# Patient Record
Sex: MALE | Race: ASIAN | NOT HISPANIC OR LATINO | URBAN - METROPOLITAN AREA
[De-identification: names, ages, dates, MRNs, and addresses within clinical notes are randomized per-mention and may not be internally consistent; named-entity substitution may affect disease eponyms.]

---

## 2024-01-01 ENCOUNTER — INPATIENT (INPATIENT)
Facility: HOSPITAL | Age: 74
LOS: 19 days | DRG: 641 | End: 2024-09-05
Attending: INTERNAL MEDICINE | Admitting: HOSPITALIST
Payer: MEDICARE

## 2024-01-01 VITALS
DIASTOLIC BLOOD PRESSURE: 84 MMHG | OXYGEN SATURATION: 97 % | SYSTOLIC BLOOD PRESSURE: 145 MMHG | HEART RATE: 80 BPM | TEMPERATURE: 98 F | HEIGHT: 65 IN | WEIGHT: 149.91 LBS | RESPIRATION RATE: 16 BRPM

## 2024-01-01 VITALS
TEMPERATURE: 98 F | SYSTOLIC BLOOD PRESSURE: 82 MMHG | DIASTOLIC BLOOD PRESSURE: 66 MMHG | OXYGEN SATURATION: 100 % | RESPIRATION RATE: 23 BRPM | HEART RATE: 98 BPM

## 2024-01-01 DIAGNOSIS — E87.1 HYPO-OSMOLALITY AND HYPONATREMIA: ICD-10-CM

## 2024-01-01 DIAGNOSIS — I25.10 ATHEROSCLEROTIC HEART DISEASE OF NATIVE CORONARY ARTERY WITHOUT ANGINA PECTORIS: ICD-10-CM

## 2024-01-01 DIAGNOSIS — R79.89 OTHER SPECIFIED ABNORMAL FINDINGS OF BLOOD CHEMISTRY: ICD-10-CM

## 2024-01-01 DIAGNOSIS — Z29.9 ENCOUNTER FOR PROPHYLACTIC MEASURES, UNSPECIFIED: ICD-10-CM

## 2024-01-01 DIAGNOSIS — Z51.5 ENCOUNTER FOR PALLIATIVE CARE: ICD-10-CM

## 2024-01-01 DIAGNOSIS — R23.4 CHANGES IN SKIN TEXTURE: ICD-10-CM

## 2024-01-01 DIAGNOSIS — C16.9 MALIGNANT NEOPLASM OF STOMACH, UNSPECIFIED: ICD-10-CM

## 2024-01-01 DIAGNOSIS — R74.8 ABNORMAL LEVELS OF OTHER SERUM ENZYMES: ICD-10-CM

## 2024-01-01 DIAGNOSIS — I10 ESSENTIAL (PRIMARY) HYPERTENSION: ICD-10-CM

## 2024-01-01 DIAGNOSIS — Z71.89 OTHER SPECIFIED COUNSELING: ICD-10-CM

## 2024-01-01 DIAGNOSIS — R53.81 OTHER MALAISE: ICD-10-CM

## 2024-01-01 DIAGNOSIS — R06.02 SHORTNESS OF BREATH: ICD-10-CM

## 2024-01-01 DIAGNOSIS — N40.0 BENIGN PROSTATIC HYPERPLASIA WITHOUT LOWER URINARY TRACT SYMPTOMS: ICD-10-CM

## 2024-01-01 DIAGNOSIS — E78.5 HYPERLIPIDEMIA, UNSPECIFIED: ICD-10-CM

## 2024-01-01 LAB
ADD ON TEST-SPECIMEN IN LAB: SIGNIFICANT CHANGE UP
ADD ON TEST-SPECIMEN IN LAB: SIGNIFICANT CHANGE UP
ALBUMIN SERPL ELPH-MCNC: 2 G/DL — LOW (ref 3.3–5)
ALBUMIN SERPL ELPH-MCNC: 2.2 G/DL — LOW (ref 3.3–5)
ALBUMIN SERPL ELPH-MCNC: 2.5 G/DL — LOW (ref 3.3–5)
ALBUMIN SERPL ELPH-MCNC: 2.6 G/DL — LOW (ref 3.3–5)
ALBUMIN SERPL ELPH-MCNC: 2.7 G/DL — LOW (ref 3.3–5)
ALBUMIN SERPL ELPH-MCNC: 2.8 G/DL — LOW (ref 3.3–5)
ALBUMIN SERPL ELPH-MCNC: 2.9 G/DL — LOW (ref 3.3–5)
ALBUMIN SERPL ELPH-MCNC: 3 G/DL — LOW (ref 3.3–5)
ALBUMIN SERPL ELPH-MCNC: 3 G/DL — LOW (ref 3.3–5)
ALBUMIN SERPL ELPH-MCNC: 3.1 G/DL — LOW (ref 3.3–5)
ALBUMIN SERPL ELPH-MCNC: 3.1 G/DL — LOW (ref 3.3–5)
ALBUMIN SERPL ELPH-MCNC: 3.2 G/DL — LOW (ref 3.3–5)
ALBUMIN SERPL ELPH-MCNC: 3.4 G/DL — SIGNIFICANT CHANGE UP (ref 3.3–5)
ALP SERPL-CCNC: 569 U/L — HIGH (ref 40–120)
ALP SERPL-CCNC: 614 U/L — HIGH (ref 40–120)
ALP SERPL-CCNC: 785 U/L — HIGH (ref 40–120)
ALP SERPL-CCNC: 785 U/L — HIGH (ref 40–120)
ALP SERPL-CCNC: 789 U/L — HIGH (ref 40–120)
ALP SERPL-CCNC: 797 U/L — HIGH (ref 40–120)
ALP SERPL-CCNC: 797 U/L — HIGH (ref 40–120)
ALP SERPL-CCNC: 806 U/L — HIGH (ref 40–120)
ALP SERPL-CCNC: 810 U/L — HIGH (ref 40–120)
ALP SERPL-CCNC: 828 U/L — HIGH (ref 40–120)
ALP SERPL-CCNC: 849 U/L — HIGH (ref 40–120)
ALP SERPL-CCNC: 853 U/L — HIGH (ref 40–120)
ALP SERPL-CCNC: 880 U/L — HIGH (ref 40–120)
ALP SERPL-CCNC: 881 U/L — HIGH (ref 40–120)
ALP SERPL-CCNC: 887 U/L — HIGH (ref 40–120)
ALP SERPL-CCNC: 909 U/L — HIGH (ref 40–120)
ALP SERPL-CCNC: 958 U/L — HIGH (ref 40–120)
ALP SERPL-CCNC: 978 U/L — HIGH (ref 40–120)
ALT FLD-CCNC: 103 U/L — HIGH (ref 10–45)
ALT FLD-CCNC: 107 U/L — HIGH (ref 10–45)
ALT FLD-CCNC: 108 U/L — HIGH (ref 10–45)
ALT FLD-CCNC: 114 U/L — HIGH (ref 10–45)
ALT FLD-CCNC: 114 U/L — HIGH (ref 10–45)
ALT FLD-CCNC: 121 U/L — HIGH (ref 10–45)
ALT FLD-CCNC: 125 U/L — HIGH (ref 10–45)
ALT FLD-CCNC: 128 U/L — HIGH (ref 10–45)
ALT FLD-CCNC: 141 U/L — HIGH (ref 10–45)
ALT FLD-CCNC: 172 U/L — HIGH (ref 10–45)
ALT FLD-CCNC: 215 U/L — HIGH (ref 10–45)
ALT FLD-CCNC: 217 U/L — HIGH (ref 10–45)
ALT FLD-CCNC: 292 U/L — HIGH (ref 10–45)
ALT FLD-CCNC: 310 U/L — HIGH (ref 10–45)
ALT FLD-CCNC: 71 U/L — HIGH (ref 10–45)
ALT FLD-CCNC: 72 U/L — HIGH (ref 10–45)
ALT FLD-CCNC: 73 U/L — HIGH (ref 10–45)
ALT FLD-CCNC: 91 U/L — HIGH (ref 10–45)
ANION GAP SERPL CALC-SCNC: 12 MMOL/L — SIGNIFICANT CHANGE UP (ref 5–17)
ANION GAP SERPL CALC-SCNC: 14 MMOL/L — SIGNIFICANT CHANGE UP (ref 5–17)
ANION GAP SERPL CALC-SCNC: 15 MMOL/L — SIGNIFICANT CHANGE UP (ref 5–17)
ANION GAP SERPL CALC-SCNC: 16 MMOL/L — SIGNIFICANT CHANGE UP (ref 5–17)
ANION GAP SERPL CALC-SCNC: 17 MMOL/L — SIGNIFICANT CHANGE UP (ref 5–17)
ANION GAP SERPL CALC-SCNC: 18 MMOL/L — HIGH (ref 5–17)
ANION GAP SERPL CALC-SCNC: 19 MMOL/L — HIGH (ref 5–17)
ANION GAP SERPL CALC-SCNC: 20 MMOL/L — HIGH (ref 5–17)
ANION GAP SERPL CALC-SCNC: 23 MMOL/L — HIGH (ref 5–17)
ANION GAP SERPL CALC-SCNC: 25 MMOL/L — HIGH (ref 5–17)
ANION GAP SERPL CALC-SCNC: 28 MMOL/L — HIGH (ref 5–17)
ANION GAP SERPL CALC-SCNC: 40 MMOL/L — HIGH (ref 5–17)
ANISOCYTOSIS BLD QL: SIGNIFICANT CHANGE UP
ANISOCYTOSIS BLD QL: SIGNIFICANT CHANGE UP
APPEARANCE UR: CLEAR — SIGNIFICANT CHANGE UP
APTT BLD: 25.2 SEC — SIGNIFICANT CHANGE UP (ref 24.5–35.6)
APTT BLD: 26.1 SEC — SIGNIFICANT CHANGE UP (ref 24.5–35.6)
APTT BLD: 26.9 SEC — SIGNIFICANT CHANGE UP (ref 24.5–35.6)
APTT BLD: 36.3 SEC — HIGH (ref 24.5–35.6)
AST SERPL-CCNC: 132 U/L — HIGH (ref 10–40)
AST SERPL-CCNC: 136 U/L — HIGH (ref 10–40)
AST SERPL-CCNC: 158 U/L — HIGH (ref 10–40)
AST SERPL-CCNC: 161 U/L — HIGH (ref 10–40)
AST SERPL-CCNC: 180 U/L — HIGH (ref 10–40)
AST SERPL-CCNC: 194 U/L — HIGH (ref 10–40)
AST SERPL-CCNC: 204 U/L — HIGH (ref 10–40)
AST SERPL-CCNC: 213 U/L — HIGH (ref 10–40)
AST SERPL-CCNC: 214 U/L — HIGH (ref 10–40)
AST SERPL-CCNC: 239 U/L — HIGH (ref 10–40)
AST SERPL-CCNC: 248 U/L — HIGH (ref 10–40)
AST SERPL-CCNC: 267 U/L — HIGH (ref 10–40)
AST SERPL-CCNC: 304 U/L — HIGH (ref 10–40)
AST SERPL-CCNC: 402 U/L — HIGH (ref 10–40)
AST SERPL-CCNC: 417 U/L — HIGH (ref 10–40)
AST SERPL-CCNC: 511 U/L — HIGH (ref 10–40)
AST SERPL-CCNC: 661 U/L — HIGH (ref 10–40)
AST SERPL-CCNC: 893 U/L — HIGH (ref 10–40)
BASOPHILS # BLD AUTO: 0 K/UL — SIGNIFICANT CHANGE UP (ref 0–0.2)
BASOPHILS # BLD AUTO: 0 K/UL — SIGNIFICANT CHANGE UP (ref 0–0.2)
BASOPHILS # BLD AUTO: 0.03 K/UL — SIGNIFICANT CHANGE UP (ref 0–0.2)
BASOPHILS # BLD AUTO: 0.06 K/UL — SIGNIFICANT CHANGE UP (ref 0–0.2)
BASOPHILS # BLD AUTO: 0.07 K/UL — SIGNIFICANT CHANGE UP (ref 0–0.2)
BASOPHILS NFR BLD AUTO: 0 % — SIGNIFICANT CHANGE UP (ref 0–2)
BASOPHILS NFR BLD AUTO: 0 % — SIGNIFICANT CHANGE UP (ref 0–2)
BASOPHILS NFR BLD AUTO: 0.2 % — SIGNIFICANT CHANGE UP (ref 0–2)
BASOPHILS NFR BLD AUTO: 0.7 % — SIGNIFICANT CHANGE UP (ref 0–2)
BASOPHILS NFR BLD AUTO: 1 % — SIGNIFICANT CHANGE UP (ref 0–2)
BILIRUB DIRECT SERPL-MCNC: 2.1 MG/DL — HIGH (ref 0–0.3)
BILIRUB DIRECT SERPL-MCNC: 7.1 MG/DL — HIGH (ref 0–0.3)
BILIRUB INDIRECT FLD-MCNC: 0.9 MG/DL — SIGNIFICANT CHANGE UP (ref 0.2–1)
BILIRUB SERPL-MCNC: 1.5 MG/DL — HIGH (ref 0.2–1.2)
BILIRUB SERPL-MCNC: 10.2 MG/DL — HIGH (ref 0.2–1.2)
BILIRUB SERPL-MCNC: 10.5 MG/DL — HIGH (ref 0.2–1.2)
BILIRUB SERPL-MCNC: 10.6 MG/DL — HIGH (ref 0.2–1.2)
BILIRUB SERPL-MCNC: 10.8 MG/DL — HIGH (ref 0.2–1.2)
BILIRUB SERPL-MCNC: 3 MG/DL — HIGH (ref 0.2–1.2)
BILIRUB SERPL-MCNC: 3 MG/DL — HIGH (ref 0.2–1.2)
BILIRUB SERPL-MCNC: 3.3 MG/DL — HIGH (ref 0.2–1.2)
BILIRUB SERPL-MCNC: 4.2 MG/DL — HIGH (ref 0.2–1.2)
BILIRUB SERPL-MCNC: 4.2 MG/DL — HIGH (ref 0.2–1.2)
BILIRUB SERPL-MCNC: 5 MG/DL — HIGH (ref 0.2–1.2)
BILIRUB SERPL-MCNC: 5.9 MG/DL — HIGH (ref 0.2–1.2)
BILIRUB SERPL-MCNC: 7 MG/DL — HIGH (ref 0.2–1.2)
BILIRUB SERPL-MCNC: 7.2 MG/DL — HIGH (ref 0.2–1.2)
BILIRUB SERPL-MCNC: 8.1 MG/DL — HIGH (ref 0.2–1.2)
BILIRUB SERPL-MCNC: 9 MG/DL — HIGH (ref 0.2–1.2)
BILIRUB SERPL-MCNC: 9 MG/DL — HIGH (ref 0.2–1.2)
BILIRUB SERPL-MCNC: 9.5 MG/DL — HIGH (ref 0.2–1.2)
BILIRUB SERPL-MCNC: 9.8 MG/DL — HIGH (ref 0.2–1.2)
BILIRUB UR-MCNC: NEGATIVE — SIGNIFICANT CHANGE UP
BLD GP AB SCN SERPL QL: NEGATIVE — SIGNIFICANT CHANGE UP
BUN SERPL-MCNC: 13 MG/DL — SIGNIFICANT CHANGE UP (ref 7–23)
BUN SERPL-MCNC: 16 MG/DL — SIGNIFICANT CHANGE UP (ref 7–23)
BUN SERPL-MCNC: 16 MG/DL — SIGNIFICANT CHANGE UP (ref 7–23)
BUN SERPL-MCNC: 17 MG/DL — SIGNIFICANT CHANGE UP (ref 7–23)
BUN SERPL-MCNC: 17 MG/DL — SIGNIFICANT CHANGE UP (ref 7–23)
BUN SERPL-MCNC: 18 MG/DL — SIGNIFICANT CHANGE UP (ref 7–23)
BUN SERPL-MCNC: 18 MG/DL — SIGNIFICANT CHANGE UP (ref 7–23)
BUN SERPL-MCNC: 19 MG/DL — SIGNIFICANT CHANGE UP (ref 7–23)
BUN SERPL-MCNC: 20 MG/DL — SIGNIFICANT CHANGE UP (ref 7–23)
BUN SERPL-MCNC: 21 MG/DL — SIGNIFICANT CHANGE UP (ref 7–23)
BUN SERPL-MCNC: 21 MG/DL — SIGNIFICANT CHANGE UP (ref 7–23)
BUN SERPL-MCNC: 22 MG/DL — SIGNIFICANT CHANGE UP (ref 7–23)
BUN SERPL-MCNC: 22 MG/DL — SIGNIFICANT CHANGE UP (ref 7–23)
BUN SERPL-MCNC: 23 MG/DL — SIGNIFICANT CHANGE UP (ref 7–23)
BUN SERPL-MCNC: 25 MG/DL — HIGH (ref 7–23)
BUN SERPL-MCNC: 27 MG/DL — HIGH (ref 7–23)
BUN SERPL-MCNC: 28 MG/DL — HIGH (ref 7–23)
BUN SERPL-MCNC: 38 MG/DL — HIGH (ref 7–23)
BUN SERPL-MCNC: 59 MG/DL — HIGH (ref 7–23)
BUN SERPL-MCNC: 62 MG/DL — HIGH (ref 7–23)
BUN SERPL-MCNC: 70 MG/DL — HIGH (ref 7–23)
BUN SERPL-MCNC: 73 MG/DL — HIGH (ref 7–23)
BUN SERPL-MCNC: 73 MG/DL — HIGH (ref 7–23)
BUN SERPL-MCNC: 80 MG/DL — HIGH (ref 7–23)
BUN SERPL-MCNC: 86 MG/DL — HIGH (ref 7–23)
BUN SERPL-MCNC: 89 MG/DL — HIGH (ref 7–23)
BUN SERPL-MCNC: 98 MG/DL — HIGH (ref 7–23)
BURR CELLS BLD QL SMEAR: PRESENT — SIGNIFICANT CHANGE UP
CALCIUM SERPL-MCNC: 10 MG/DL — SIGNIFICANT CHANGE UP (ref 8.4–10.5)
CALCIUM SERPL-MCNC: 10.1 MG/DL — SIGNIFICANT CHANGE UP (ref 8.4–10.5)
CALCIUM SERPL-MCNC: 10.1 MG/DL — SIGNIFICANT CHANGE UP (ref 8.4–10.5)
CALCIUM SERPL-MCNC: 10.3 MG/DL — SIGNIFICANT CHANGE UP (ref 8.4–10.5)
CALCIUM SERPL-MCNC: 10.3 MG/DL — SIGNIFICANT CHANGE UP (ref 8.4–10.5)
CALCIUM SERPL-MCNC: 10.7 MG/DL — HIGH (ref 8.4–10.5)
CALCIUM SERPL-MCNC: 8.9 MG/DL — SIGNIFICANT CHANGE UP (ref 8.4–10.5)
CALCIUM SERPL-MCNC: 9 MG/DL — SIGNIFICANT CHANGE UP (ref 8.4–10.5)
CALCIUM SERPL-MCNC: 9 MG/DL — SIGNIFICANT CHANGE UP (ref 8.4–10.5)
CALCIUM SERPL-MCNC: 9.1 MG/DL — SIGNIFICANT CHANGE UP (ref 8.4–10.5)
CALCIUM SERPL-MCNC: 9.2 MG/DL — SIGNIFICANT CHANGE UP (ref 8.4–10.5)
CALCIUM SERPL-MCNC: 9.3 MG/DL — SIGNIFICANT CHANGE UP (ref 8.4–10.5)
CALCIUM SERPL-MCNC: 9.4 MG/DL — SIGNIFICANT CHANGE UP (ref 8.4–10.5)
CALCIUM SERPL-MCNC: 9.5 MG/DL — SIGNIFICANT CHANGE UP (ref 8.4–10.5)
CALCIUM SERPL-MCNC: 9.6 MG/DL — SIGNIFICANT CHANGE UP (ref 8.4–10.5)
CALCIUM SERPL-MCNC: 9.6 MG/DL — SIGNIFICANT CHANGE UP (ref 8.4–10.5)
CALCIUM SERPL-MCNC: 9.7 MG/DL — SIGNIFICANT CHANGE UP (ref 8.4–10.5)
CALCIUM SERPL-MCNC: 9.7 MG/DL — SIGNIFICANT CHANGE UP (ref 8.4–10.5)
CALCIUM SERPL-MCNC: 9.8 MG/DL — SIGNIFICANT CHANGE UP (ref 8.4–10.5)
CALCIUM SERPL-MCNC: 9.8 MG/DL — SIGNIFICANT CHANGE UP (ref 8.4–10.5)
CHLORIDE SERPL-SCNC: 102 MMOL/L — SIGNIFICANT CHANGE UP (ref 96–108)
CHLORIDE SERPL-SCNC: 102 MMOL/L — SIGNIFICANT CHANGE UP (ref 96–108)
CHLORIDE SERPL-SCNC: 86 MMOL/L — LOW (ref 96–108)
CHLORIDE SERPL-SCNC: 87 MMOL/L — LOW (ref 96–108)
CHLORIDE SERPL-SCNC: 88 MMOL/L — LOW (ref 96–108)
CHLORIDE SERPL-SCNC: 90 MMOL/L — LOW (ref 96–108)
CHLORIDE SERPL-SCNC: 91 MMOL/L — LOW (ref 96–108)
CHLORIDE SERPL-SCNC: 92 MMOL/L — LOW (ref 96–108)
CHLORIDE SERPL-SCNC: 93 MMOL/L — LOW (ref 96–108)
CHLORIDE SERPL-SCNC: 95 MMOL/L — LOW (ref 96–108)
CHLORIDE SERPL-SCNC: 95 MMOL/L — LOW (ref 96–108)
CHLORIDE SERPL-SCNC: 96 MMOL/L — SIGNIFICANT CHANGE UP (ref 96–108)
CHLORIDE SERPL-SCNC: 96 MMOL/L — SIGNIFICANT CHANGE UP (ref 96–108)
CHLORIDE SERPL-SCNC: 99 MMOL/L — SIGNIFICANT CHANGE UP (ref 96–108)
CHLORIDE UR-SCNC: <20 MMOL/L — SIGNIFICANT CHANGE UP
CHLORIDE UR-SCNC: <20 MMOL/L — SIGNIFICANT CHANGE UP
CO2 SERPL-SCNC: 13 MMOL/L — LOW (ref 22–31)
CO2 SERPL-SCNC: 14 MMOL/L — LOW (ref 22–31)
CO2 SERPL-SCNC: 14 MMOL/L — LOW (ref 22–31)
CO2 SERPL-SCNC: 15 MMOL/L — LOW (ref 22–31)
CO2 SERPL-SCNC: 18 MMOL/L — LOW (ref 22–31)
CO2 SERPL-SCNC: 19 MMOL/L — LOW (ref 22–31)
CO2 SERPL-SCNC: 20 MMOL/L — LOW (ref 22–31)
CO2 SERPL-SCNC: 21 MMOL/L — LOW (ref 22–31)
CO2 SERPL-SCNC: 22 MMOL/L — SIGNIFICANT CHANGE UP (ref 22–31)
CO2 SERPL-SCNC: 24 MMOL/L — SIGNIFICANT CHANGE UP (ref 22–31)
CO2 SERPL-SCNC: <10 MMOL/L — CRITICAL LOW (ref 22–31)
COLOR SPEC: YELLOW — SIGNIFICANT CHANGE UP
CREAT ?TM UR-MCNC: 144 MG/DL — SIGNIFICANT CHANGE UP
CREAT ?TM UR-MCNC: 71 MG/DL — SIGNIFICANT CHANGE UP
CREAT ?TM UR-MCNC: 78 MG/DL — SIGNIFICANT CHANGE UP
CREAT SERPL-MCNC: 0.65 MG/DL — SIGNIFICANT CHANGE UP (ref 0.5–1.3)
CREAT SERPL-MCNC: 0.76 MG/DL — SIGNIFICANT CHANGE UP (ref 0.5–1.3)
CREAT SERPL-MCNC: 0.77 MG/DL — SIGNIFICANT CHANGE UP (ref 0.5–1.3)
CREAT SERPL-MCNC: 0.78 MG/DL — SIGNIFICANT CHANGE UP (ref 0.5–1.3)
CREAT SERPL-MCNC: 0.79 MG/DL — SIGNIFICANT CHANGE UP (ref 0.5–1.3)
CREAT SERPL-MCNC: 0.85 MG/DL — SIGNIFICANT CHANGE UP (ref 0.5–1.3)
CREAT SERPL-MCNC: 0.87 MG/DL — SIGNIFICANT CHANGE UP (ref 0.5–1.3)
CREAT SERPL-MCNC: 0.87 MG/DL — SIGNIFICANT CHANGE UP (ref 0.5–1.3)
CREAT SERPL-MCNC: 0.9 MG/DL — SIGNIFICANT CHANGE UP (ref 0.5–1.3)
CREAT SERPL-MCNC: 0.91 MG/DL — SIGNIFICANT CHANGE UP (ref 0.5–1.3)
CREAT SERPL-MCNC: 0.92 MG/DL — SIGNIFICANT CHANGE UP (ref 0.5–1.3)
CREAT SERPL-MCNC: 0.93 MG/DL — SIGNIFICANT CHANGE UP (ref 0.5–1.3)
CREAT SERPL-MCNC: 0.94 MG/DL — SIGNIFICANT CHANGE UP (ref 0.5–1.3)
CREAT SERPL-MCNC: 0.94 MG/DL — SIGNIFICANT CHANGE UP (ref 0.5–1.3)
CREAT SERPL-MCNC: 0.97 MG/DL — SIGNIFICANT CHANGE UP (ref 0.5–1.3)
CREAT SERPL-MCNC: 0.98 MG/DL — SIGNIFICANT CHANGE UP (ref 0.5–1.3)
CREAT SERPL-MCNC: 0.99 MG/DL — SIGNIFICANT CHANGE UP (ref 0.5–1.3)
CREAT SERPL-MCNC: 1.02 MG/DL — SIGNIFICANT CHANGE UP (ref 0.5–1.3)
CREAT SERPL-MCNC: 1.05 MG/DL — SIGNIFICANT CHANGE UP (ref 0.5–1.3)
CREAT SERPL-MCNC: 1.14 MG/DL — SIGNIFICANT CHANGE UP (ref 0.5–1.3)
CREAT SERPL-MCNC: 1.3 MG/DL — SIGNIFICANT CHANGE UP (ref 0.5–1.3)
CREAT SERPL-MCNC: 1.33 MG/DL — HIGH (ref 0.5–1.3)
CREAT SERPL-MCNC: 1.43 MG/DL — HIGH (ref 0.5–1.3)
CREAT SERPL-MCNC: 1.47 MG/DL — HIGH (ref 0.5–1.3)
CREAT SERPL-MCNC: 1.54 MG/DL — HIGH (ref 0.5–1.3)
CREAT SERPL-MCNC: 1.61 MG/DL — HIGH (ref 0.5–1.3)
CREAT SERPL-MCNC: 1.62 MG/DL — HIGH (ref 0.5–1.3)
CREAT SERPL-MCNC: 2.28 MG/DL — HIGH (ref 0.5–1.3)
DACRYOCYTES BLD QL SMEAR: SLIGHT — SIGNIFICANT CHANGE UP
DACRYOCYTES BLD QL SMEAR: SLIGHT — SIGNIFICANT CHANGE UP
DIFF PNL FLD: NEGATIVE — SIGNIFICANT CHANGE UP
EGFR: 29 ML/MIN/1.73M2 — LOW
EGFR: 44 ML/MIN/1.73M2 — LOW
EGFR: 44 ML/MIN/1.73M2 — LOW
EGFR: 47 ML/MIN/1.73M2 — LOW
EGFR: 49 ML/MIN/1.73M2 — LOW
EGFR: 51 ML/MIN/1.73M2 — LOW
EGFR: 56 ML/MIN/1.73M2 — LOW
EGFR: 57 ML/MIN/1.73M2 — LOW
EGFR: 67 ML/MIN/1.73M2 — SIGNIFICANT CHANGE UP
EGFR: 74 ML/MIN/1.73M2 — SIGNIFICANT CHANGE UP
EGFR: 77 ML/MIN/1.73M2 — SIGNIFICANT CHANGE UP
EGFR: 79 ML/MIN/1.73M2 — SIGNIFICANT CHANGE UP
EGFR: 80 ML/MIN/1.73M2 — SIGNIFICANT CHANGE UP
EGFR: 81 ML/MIN/1.73M2 — SIGNIFICANT CHANGE UP
EGFR: 85 ML/MIN/1.73M2 — SIGNIFICANT CHANGE UP
EGFR: 85 ML/MIN/1.73M2 — SIGNIFICANT CHANGE UP
EGFR: 86 ML/MIN/1.73M2 — SIGNIFICANT CHANGE UP
EGFR: 87 ML/MIN/1.73M2 — SIGNIFICANT CHANGE UP
EGFR: 88 ML/MIN/1.73M2 — SIGNIFICANT CHANGE UP
EGFR: 89 ML/MIN/1.73M2 — SIGNIFICANT CHANGE UP
EGFR: 90 ML/MIN/1.73M2 — SIGNIFICANT CHANGE UP
EGFR: 90 ML/MIN/1.73M2 — SIGNIFICANT CHANGE UP
EGFR: 91 ML/MIN/1.73M2 — SIGNIFICANT CHANGE UP
EGFR: 93 ML/MIN/1.73M2 — SIGNIFICANT CHANGE UP
EGFR: 94 ML/MIN/1.73M2 — SIGNIFICANT CHANGE UP
EGFR: 98 ML/MIN/1.73M2 — SIGNIFICANT CHANGE UP
ELLIPTOCYTES BLD QL SMEAR: SLIGHT — SIGNIFICANT CHANGE UP
EOSINOPHIL # BLD AUTO: 0 K/UL — SIGNIFICANT CHANGE UP (ref 0–0.5)
EOSINOPHIL # BLD AUTO: 0.07 K/UL — SIGNIFICANT CHANGE UP (ref 0–0.5)
EOSINOPHIL # BLD AUTO: 0.09 K/UL — SIGNIFICANT CHANGE UP (ref 0–0.5)
EOSINOPHIL NFR BLD AUTO: 0 % — SIGNIFICANT CHANGE UP (ref 0–6)
EOSINOPHIL NFR BLD AUTO: 0.8 % — SIGNIFICANT CHANGE UP (ref 0–6)
EOSINOPHIL NFR BLD AUTO: 1.2 % — SIGNIFICANT CHANGE UP (ref 0–6)
GAS PNL BLDA: SIGNIFICANT CHANGE UP
GAS PNL BLDV: SIGNIFICANT CHANGE UP
GLUCOSE BLDC GLUCOMTR-MCNC: 103 MG/DL — HIGH (ref 70–99)
GLUCOSE BLDC GLUCOMTR-MCNC: 107 MG/DL — HIGH (ref 70–99)
GLUCOSE BLDC GLUCOMTR-MCNC: 75 MG/DL — SIGNIFICANT CHANGE UP (ref 70–99)
GLUCOSE BLDC GLUCOMTR-MCNC: 93 MG/DL — SIGNIFICANT CHANGE UP (ref 70–99)
GLUCOSE BLDC GLUCOMTR-MCNC: 98 MG/DL — SIGNIFICANT CHANGE UP (ref 70–99)
GLUCOSE SERPL-MCNC: 101 MG/DL — HIGH (ref 70–99)
GLUCOSE SERPL-MCNC: 108 MG/DL — HIGH (ref 70–99)
GLUCOSE SERPL-MCNC: 110 MG/DL — HIGH (ref 70–99)
GLUCOSE SERPL-MCNC: 111 MG/DL — HIGH (ref 70–99)
GLUCOSE SERPL-MCNC: 116 MG/DL — HIGH (ref 70–99)
GLUCOSE SERPL-MCNC: 55 MG/DL — LOW (ref 70–99)
GLUCOSE SERPL-MCNC: 63 MG/DL — LOW (ref 70–99)
GLUCOSE SERPL-MCNC: 65 MG/DL — LOW (ref 70–99)
GLUCOSE SERPL-MCNC: 68 MG/DL — LOW (ref 70–99)
GLUCOSE SERPL-MCNC: 71 MG/DL — SIGNIFICANT CHANGE UP (ref 70–99)
GLUCOSE SERPL-MCNC: 72 MG/DL — SIGNIFICANT CHANGE UP (ref 70–99)
GLUCOSE SERPL-MCNC: 73 MG/DL — SIGNIFICANT CHANGE UP (ref 70–99)
GLUCOSE SERPL-MCNC: 73 MG/DL — SIGNIFICANT CHANGE UP (ref 70–99)
GLUCOSE SERPL-MCNC: 75 MG/DL — SIGNIFICANT CHANGE UP (ref 70–99)
GLUCOSE SERPL-MCNC: 78 MG/DL — SIGNIFICANT CHANGE UP (ref 70–99)
GLUCOSE SERPL-MCNC: 79 MG/DL — SIGNIFICANT CHANGE UP (ref 70–99)
GLUCOSE SERPL-MCNC: 79 MG/DL — SIGNIFICANT CHANGE UP (ref 70–99)
GLUCOSE SERPL-MCNC: 80 MG/DL — SIGNIFICANT CHANGE UP (ref 70–99)
GLUCOSE SERPL-MCNC: 81 MG/DL — SIGNIFICANT CHANGE UP (ref 70–99)
GLUCOSE SERPL-MCNC: 81 MG/DL — SIGNIFICANT CHANGE UP (ref 70–99)
GLUCOSE SERPL-MCNC: 83 MG/DL — SIGNIFICANT CHANGE UP (ref 70–99)
GLUCOSE SERPL-MCNC: 86 MG/DL — SIGNIFICANT CHANGE UP (ref 70–99)
GLUCOSE SERPL-MCNC: 88 MG/DL — SIGNIFICANT CHANGE UP (ref 70–99)
GLUCOSE SERPL-MCNC: 88 MG/DL — SIGNIFICANT CHANGE UP (ref 70–99)
GLUCOSE SERPL-MCNC: 90 MG/DL — SIGNIFICANT CHANGE UP (ref 70–99)
GLUCOSE SERPL-MCNC: 92 MG/DL — SIGNIFICANT CHANGE UP (ref 70–99)
GLUCOSE SERPL-MCNC: 92 MG/DL — SIGNIFICANT CHANGE UP (ref 70–99)
GLUCOSE SERPL-MCNC: 94 MG/DL — SIGNIFICANT CHANGE UP (ref 70–99)
GLUCOSE SERPL-MCNC: 95 MG/DL — SIGNIFICANT CHANGE UP (ref 70–99)
GLUCOSE SERPL-MCNC: 96 MG/DL — SIGNIFICANT CHANGE UP (ref 70–99)
GLUCOSE UR QL: NEGATIVE MG/DL — SIGNIFICANT CHANGE UP
HAV IGM SER-ACNC: SIGNIFICANT CHANGE UP
HBV CORE IGM SER-ACNC: SIGNIFICANT CHANGE UP
HBV SURFACE AB SER-ACNC: 131.6 MIU/ML — SIGNIFICANT CHANGE UP
HBV SURFACE AG SER-ACNC: SIGNIFICANT CHANGE UP
HCT VFR BLD CALC: 21.8 % — LOW (ref 39–50)
HCT VFR BLD CALC: 23.1 % — LOW (ref 39–50)
HCT VFR BLD CALC: 23.8 % — LOW (ref 39–50)
HCT VFR BLD CALC: 24.6 % — LOW (ref 39–50)
HCT VFR BLD CALC: 26.5 % — LOW (ref 39–50)
HCT VFR BLD CALC: 26.7 % — LOW (ref 39–50)
HCT VFR BLD CALC: 26.7 % — LOW (ref 39–50)
HCT VFR BLD CALC: 26.9 % — LOW (ref 39–50)
HCT VFR BLD CALC: 30 % — LOW (ref 39–50)
HCT VFR BLD CALC: 30.9 % — LOW (ref 39–50)
HCT VFR BLD CALC: 31.2 % — LOW (ref 39–50)
HCT VFR BLD CALC: 31.3 % — LOW (ref 39–50)
HCT VFR BLD CALC: 31.4 % — LOW (ref 39–50)
HCT VFR BLD CALC: 31.5 % — LOW (ref 39–50)
HCT VFR BLD CALC: 32.2 % — LOW (ref 39–50)
HCT VFR BLD CALC: 32.2 % — LOW (ref 39–50)
HCT VFR BLD CALC: 32.5 % — LOW (ref 39–50)
HCT VFR BLD CALC: 33.4 % — LOW (ref 39–50)
HCT VFR BLD CALC: 36.1 % — LOW (ref 39–50)
HCT VFR BLD CALC: 36.8 % — LOW (ref 39–50)
HCT VFR BLD CALC: 37.5 % — LOW (ref 39–50)
HCT VFR BLD CALC: 38 % — LOW (ref 39–50)
HCT VFR BLD CALC: 39.3 % — SIGNIFICANT CHANGE UP (ref 39–50)
HCV AB S/CO SERPL IA: 0.17 S/CO — SIGNIFICANT CHANGE UP (ref 0–0.99)
HCV AB SERPL-IMP: SIGNIFICANT CHANGE UP
HGB BLD-MCNC: 10.1 G/DL — LOW (ref 13–17)
HGB BLD-MCNC: 10.3 G/DL — LOW (ref 13–17)
HGB BLD-MCNC: 10.5 G/DL — LOW (ref 13–17)
HGB BLD-MCNC: 10.6 G/DL — LOW (ref 13–17)
HGB BLD-MCNC: 10.7 G/DL — LOW (ref 13–17)
HGB BLD-MCNC: 10.8 G/DL — LOW (ref 13–17)
HGB BLD-MCNC: 10.8 G/DL — LOW (ref 13–17)
HGB BLD-MCNC: 11 G/DL — LOW (ref 13–17)
HGB BLD-MCNC: 11.1 G/DL — LOW (ref 13–17)
HGB BLD-MCNC: 11.3 G/DL — LOW (ref 13–17)
HGB BLD-MCNC: 12 G/DL — LOW (ref 13–17)
HGB BLD-MCNC: 12.1 G/DL — LOW (ref 13–17)
HGB BLD-MCNC: 12.1 G/DL — LOW (ref 13–17)
HGB BLD-MCNC: 12.7 G/DL — LOW (ref 13–17)
HGB BLD-MCNC: 12.8 G/DL — LOW (ref 13–17)
HGB BLD-MCNC: 6.5 G/DL — CRITICAL LOW (ref 13–17)
HGB BLD-MCNC: 7.1 G/DL — LOW (ref 13–17)
HGB BLD-MCNC: 7.5 G/DL — LOW (ref 13–17)
HGB BLD-MCNC: 7.8 G/DL — LOW (ref 13–17)
HGB BLD-MCNC: 8.5 G/DL — LOW (ref 13–17)
HGB BLD-MCNC: 8.6 G/DL — LOW (ref 13–17)
HGB BLD-MCNC: 8.7 G/DL — LOW (ref 13–17)
HGB BLD-MCNC: 8.8 G/DL — LOW (ref 13–17)
HIV 1+2 AB+HIV1 P24 AG SERPL QL IA: SIGNIFICANT CHANGE UP
IMM GRANULOCYTES NFR BLD AUTO: 0.8 % — SIGNIFICANT CHANGE UP (ref 0–0.9)
IMM GRANULOCYTES NFR BLD AUTO: 0.9 % — SIGNIFICANT CHANGE UP (ref 0–0.9)
IMM GRANULOCYTES NFR BLD AUTO: 1 % — HIGH (ref 0–0.9)
INR BLD: 1.03 RATIO — SIGNIFICANT CHANGE UP (ref 0.85–1.18)
INR BLD: 1.1 RATIO — SIGNIFICANT CHANGE UP (ref 0.85–1.18)
INR BLD: 1.1 RATIO — SIGNIFICANT CHANGE UP (ref 0.85–1.18)
INR BLD: 1.7 RATIO — HIGH (ref 0.85–1.18)
KETONES UR-MCNC: NEGATIVE MG/DL — SIGNIFICANT CHANGE UP
LACTATE SERPL-SCNC: 3.8 MMOL/L — HIGH (ref 0.5–2)
LEUKOCYTE ESTERASE UR-ACNC: NEGATIVE — SIGNIFICANT CHANGE UP
LIDOCAIN IGE QN: 63 U/L — HIGH (ref 7–60)
LIDOCAIN IGE QN: 67 U/L — HIGH (ref 7–60)
LIDOCAIN IGE QN: 79 U/L — HIGH (ref 7–60)
LYMPHOCYTES # BLD AUTO: 0.6 K/UL — LOW (ref 1–3.3)
LYMPHOCYTES # BLD AUTO: 0.69 K/UL — LOW (ref 1–3.3)
LYMPHOCYTES # BLD AUTO: 0.82 K/UL — LOW (ref 1–3.3)
LYMPHOCYTES # BLD AUTO: 1.38 K/UL — SIGNIFICANT CHANGE UP (ref 1–3.3)
LYMPHOCYTES # BLD AUTO: 1.51 K/UL — SIGNIFICANT CHANGE UP (ref 1–3.3)
LYMPHOCYTES # BLD AUTO: 17.2 % — SIGNIFICANT CHANGE UP (ref 13–44)
LYMPHOCYTES # BLD AUTO: 18.8 % — SIGNIFICANT CHANGE UP (ref 13–44)
LYMPHOCYTES # BLD AUTO: 4.4 % — LOW (ref 13–44)
LYMPHOCYTES # BLD AUTO: 6.4 % — LOW (ref 13–44)
LYMPHOCYTES # BLD AUTO: 6.9 % — LOW (ref 13–44)
MACROCYTES BLD QL: SIGNIFICANT CHANGE UP
MACROCYTES BLD QL: SIGNIFICANT CHANGE UP
MAGNESIUM SERPL-MCNC: 2 MG/DL — SIGNIFICANT CHANGE UP (ref 1.6–2.6)
MAGNESIUM SERPL-MCNC: 2.1 MG/DL — SIGNIFICANT CHANGE UP (ref 1.6–2.6)
MAGNESIUM SERPL-MCNC: 2.2 MG/DL — SIGNIFICANT CHANGE UP (ref 1.6–2.6)
MAGNESIUM SERPL-MCNC: 2.2 MG/DL — SIGNIFICANT CHANGE UP (ref 1.6–2.6)
MAGNESIUM SERPL-MCNC: 2.3 MG/DL — SIGNIFICANT CHANGE UP (ref 1.6–2.6)
MAGNESIUM SERPL-MCNC: 2.3 MG/DL — SIGNIFICANT CHANGE UP (ref 1.6–2.6)
MAGNESIUM SERPL-MCNC: 2.4 MG/DL — SIGNIFICANT CHANGE UP (ref 1.6–2.6)
MAGNESIUM SERPL-MCNC: 2.5 MG/DL — SIGNIFICANT CHANGE UP (ref 1.6–2.6)
MAGNESIUM SERPL-MCNC: 2.5 MG/DL — SIGNIFICANT CHANGE UP (ref 1.6–2.6)
MAGNESIUM SERPL-MCNC: 2.6 MG/DL — SIGNIFICANT CHANGE UP (ref 1.6–2.6)
MAGNESIUM SERPL-MCNC: 2.7 MG/DL — HIGH (ref 1.6–2.6)
MAGNESIUM SERPL-MCNC: 3.1 MG/DL — HIGH (ref 1.6–2.6)
MANUAL SMEAR VERIFICATION: SIGNIFICANT CHANGE UP
MANUAL SMEAR VERIFICATION: SIGNIFICANT CHANGE UP
MCHC RBC-ENTMCNC: 29 PG — SIGNIFICANT CHANGE UP (ref 27–34)
MCHC RBC-ENTMCNC: 29.3 PG — SIGNIFICANT CHANGE UP (ref 27–34)
MCHC RBC-ENTMCNC: 29.4 PG — SIGNIFICANT CHANGE UP (ref 27–34)
MCHC RBC-ENTMCNC: 29.7 PG — SIGNIFICANT CHANGE UP (ref 27–34)
MCHC RBC-ENTMCNC: 29.8 GM/DL — LOW (ref 32–36)
MCHC RBC-ENTMCNC: 29.8 GM/DL — LOW (ref 32–36)
MCHC RBC-ENTMCNC: 29.8 PG — SIGNIFICANT CHANGE UP (ref 27–34)
MCHC RBC-ENTMCNC: 29.8 PG — SIGNIFICANT CHANGE UP (ref 27–34)
MCHC RBC-ENTMCNC: 29.9 PG — SIGNIFICANT CHANGE UP (ref 27–34)
MCHC RBC-ENTMCNC: 29.9 PG — SIGNIFICANT CHANGE UP (ref 27–34)
MCHC RBC-ENTMCNC: 30 PG — SIGNIFICANT CHANGE UP (ref 27–34)
MCHC RBC-ENTMCNC: 30.1 PG — SIGNIFICANT CHANGE UP (ref 27–34)
MCHC RBC-ENTMCNC: 30.1 PG — SIGNIFICANT CHANGE UP (ref 27–34)
MCHC RBC-ENTMCNC: 30.2 PG — SIGNIFICANT CHANGE UP (ref 27–34)
MCHC RBC-ENTMCNC: 30.3 PG — SIGNIFICANT CHANGE UP (ref 27–34)
MCHC RBC-ENTMCNC: 30.4 PG — SIGNIFICANT CHANGE UP (ref 27–34)
MCHC RBC-ENTMCNC: 30.5 PG — SIGNIFICANT CHANGE UP (ref 27–34)
MCHC RBC-ENTMCNC: 30.6 PG — SIGNIFICANT CHANGE UP (ref 27–34)
MCHC RBC-ENTMCNC: 30.8 PG — SIGNIFICANT CHANGE UP (ref 27–34)
MCHC RBC-ENTMCNC: 30.9 PG — SIGNIFICANT CHANGE UP (ref 27–34)
MCHC RBC-ENTMCNC: 31 PG — SIGNIFICANT CHANGE UP (ref 27–34)
MCHC RBC-ENTMCNC: 31.3 PG — SIGNIFICANT CHANGE UP (ref 27–34)
MCHC RBC-ENTMCNC: 31.6 PG — SIGNIFICANT CHANGE UP (ref 27–34)
MCHC RBC-ENTMCNC: 31.7 GM/DL — LOW (ref 32–36)
MCHC RBC-ENTMCNC: 32.1 GM/DL — SIGNIFICANT CHANGE UP (ref 32–36)
MCHC RBC-ENTMCNC: 32.2 GM/DL — SIGNIFICANT CHANGE UP (ref 32–36)
MCHC RBC-ENTMCNC: 32.3 GM/DL — SIGNIFICANT CHANGE UP (ref 32–36)
MCHC RBC-ENTMCNC: 32.3 GM/DL — SIGNIFICANT CHANGE UP (ref 32–36)
MCHC RBC-ENTMCNC: 32.5 GM/DL — SIGNIFICANT CHANGE UP (ref 32–36)
MCHC RBC-ENTMCNC: 32.6 GM/DL — SIGNIFICANT CHANGE UP (ref 32–36)
MCHC RBC-ENTMCNC: 32.7 GM/DL — SIGNIFICANT CHANGE UP (ref 32–36)
MCHC RBC-ENTMCNC: 32.8 GM/DL — SIGNIFICANT CHANGE UP (ref 32–36)
MCHC RBC-ENTMCNC: 32.9 GM/DL — SIGNIFICANT CHANGE UP (ref 32–36)
MCHC RBC-ENTMCNC: 33 GM/DL — SIGNIFICANT CHANGE UP (ref 32–36)
MCHC RBC-ENTMCNC: 33.2 GM/DL — SIGNIFICANT CHANGE UP (ref 32–36)
MCHC RBC-ENTMCNC: 33.2 GM/DL — SIGNIFICANT CHANGE UP (ref 32–36)
MCHC RBC-ENTMCNC: 33.4 GM/DL — SIGNIFICANT CHANGE UP (ref 32–36)
MCHC RBC-ENTMCNC: 33.5 GM/DL — SIGNIFICANT CHANGE UP (ref 32–36)
MCHC RBC-ENTMCNC: 33.5 GM/DL — SIGNIFICANT CHANGE UP (ref 32–36)
MCHC RBC-ENTMCNC: 34 GM/DL — SIGNIFICANT CHANGE UP (ref 32–36)
MCHC RBC-ENTMCNC: 34.2 GM/DL — SIGNIFICANT CHANGE UP (ref 32–36)
MCHC RBC-ENTMCNC: 34.6 GM/DL — SIGNIFICANT CHANGE UP (ref 32–36)
MCHC RBC-ENTMCNC: 34.8 GM/DL — SIGNIFICANT CHANGE UP (ref 32–36)
MCHC RBC-ENTMCNC: 35.3 GM/DL — SIGNIFICANT CHANGE UP (ref 32–36)
MCV RBC AUTO: 102.1 FL — HIGH (ref 80–100)
MCV RBC AUTO: 105.8 FL — HIGH (ref 80–100)
MCV RBC AUTO: 86 FL — SIGNIFICANT CHANGE UP (ref 80–100)
MCV RBC AUTO: 86.2 FL — SIGNIFICANT CHANGE UP (ref 80–100)
MCV RBC AUTO: 88 FL — SIGNIFICANT CHANGE UP (ref 80–100)
MCV RBC AUTO: 88.2 FL — SIGNIFICANT CHANGE UP (ref 80–100)
MCV RBC AUTO: 88.2 FL — SIGNIFICANT CHANGE UP (ref 80–100)
MCV RBC AUTO: 88.5 FL — SIGNIFICANT CHANGE UP (ref 80–100)
MCV RBC AUTO: 88.7 FL — SIGNIFICANT CHANGE UP (ref 80–100)
MCV RBC AUTO: 89 FL — SIGNIFICANT CHANGE UP (ref 80–100)
MCV RBC AUTO: 89.2 FL — SIGNIFICANT CHANGE UP (ref 80–100)
MCV RBC AUTO: 89.5 FL — SIGNIFICANT CHANGE UP (ref 80–100)
MCV RBC AUTO: 90 FL — SIGNIFICANT CHANGE UP (ref 80–100)
MCV RBC AUTO: 90.2 FL — SIGNIFICANT CHANGE UP (ref 80–100)
MCV RBC AUTO: 90.3 FL — SIGNIFICANT CHANGE UP (ref 80–100)
MCV RBC AUTO: 91.2 FL — SIGNIFICANT CHANGE UP (ref 80–100)
MCV RBC AUTO: 91.4 FL — SIGNIFICANT CHANGE UP (ref 80–100)
MCV RBC AUTO: 91.8 FL — SIGNIFICANT CHANGE UP (ref 80–100)
MCV RBC AUTO: 92.8 FL — SIGNIFICANT CHANGE UP (ref 80–100)
MCV RBC AUTO: 95 FL — SIGNIFICANT CHANGE UP (ref 80–100)
MCV RBC AUTO: 95.1 FL — SIGNIFICANT CHANGE UP (ref 80–100)
MCV RBC AUTO: 96 FL — SIGNIFICANT CHANGE UP (ref 80–100)
MCV RBC AUTO: 98.8 FL — SIGNIFICANT CHANGE UP (ref 80–100)
METAMYELOCYTES # FLD: 0.9 % — HIGH (ref 0–0)
MONOCYTES # BLD AUTO: 0.48 K/UL — SIGNIFICANT CHANGE UP (ref 0–0.9)
MONOCYTES # BLD AUTO: 0.59 K/UL — SIGNIFICANT CHANGE UP (ref 0–0.9)
MONOCYTES # BLD AUTO: 0.68 K/UL — SIGNIFICANT CHANGE UP (ref 0–0.9)
MONOCYTES # BLD AUTO: 0.75 K/UL — SIGNIFICANT CHANGE UP (ref 0–0.9)
MONOCYTES # BLD AUTO: 0.82 K/UL — SIGNIFICANT CHANGE UP (ref 0–0.9)
MONOCYTES NFR BLD AUTO: 3.8 % — SIGNIFICANT CHANGE UP (ref 2–14)
MONOCYTES NFR BLD AUTO: 5.2 % — SIGNIFICANT CHANGE UP (ref 2–14)
MONOCYTES NFR BLD AUTO: 7.8 % — SIGNIFICANT CHANGE UP (ref 2–14)
MONOCYTES NFR BLD AUTO: 8 % — SIGNIFICANT CHANGE UP (ref 2–14)
MONOCYTES NFR BLD AUTO: 8.6 % — SIGNIFICANT CHANGE UP (ref 2–14)
MYELOCYTES NFR BLD: 1.7 % — HIGH (ref 0–0)
NEUTROPHILS # BLD AUTO: 11.34 K/UL — HIGH (ref 1.8–7.4)
NEUTROPHILS # BLD AUTO: 14 K/UL — HIGH (ref 1.8–7.4)
NEUTROPHILS # BLD AUTO: 5.14 K/UL — SIGNIFICANT CHANGE UP (ref 1.8–7.4)
NEUTROPHILS # BLD AUTO: 6.28 K/UL — SIGNIFICANT CHANGE UP (ref 1.8–7.4)
NEUTROPHILS # BLD AUTO: 7.33 K/UL — SIGNIFICANT CHANGE UP (ref 1.8–7.4)
NEUTROPHILS NFR BLD AUTO: 70.2 % — SIGNIFICANT CHANGE UP (ref 43–77)
NEUTROPHILS NFR BLD AUTO: 71.7 % — SIGNIFICANT CHANGE UP (ref 43–77)
NEUTROPHILS NFR BLD AUTO: 78.3 % — HIGH (ref 43–77)
NEUTROPHILS NFR BLD AUTO: 88.7 % — HIGH (ref 43–77)
NEUTROPHILS NFR BLD AUTO: 88.7 % — HIGH (ref 43–77)
NEUTS BAND # BLD: 6.1 % — SIGNIFICANT CHANGE UP (ref 0–8)
NITRITE UR-MCNC: NEGATIVE — SIGNIFICANT CHANGE UP
NON-GYNECOLOGICAL CYTOLOGY STUDY: SIGNIFICANT CHANGE UP
NRBC # BLD: 0 /100 WBCS — SIGNIFICANT CHANGE UP (ref 0–0)
NRBC # BLD: 4 /100 WBCS — HIGH (ref 0–0)
NRBC # BLD: 6 /100 WBCS — HIGH (ref 0–0)
NT-PROBNP SERPL-SCNC: 1635 PG/ML — HIGH (ref 0–300)
OSMOLALITY SERPL: 270 MOSMOL/KG — LOW (ref 280–301)
OSMOLALITY UR: 404 MOS/KG — SIGNIFICANT CHANGE UP (ref 300–900)
OSMOLALITY UR: 429 MOS/KG — SIGNIFICANT CHANGE UP (ref 300–900)
OSMOLALITY UR: 471 MOS/KG — SIGNIFICANT CHANGE UP (ref 300–900)
OSMOLALITY UR: 490 MOS/KG — SIGNIFICANT CHANGE UP (ref 300–900)
OSMOLALITY UR: 528 MOS/KG — SIGNIFICANT CHANGE UP (ref 300–900)
OSMOLALITY UR: 598 MOS/KG — SIGNIFICANT CHANGE UP (ref 300–900)
PH UR: 6.5 — SIGNIFICANT CHANGE UP (ref 5–8)
PHOSPHATE SERPL-MCNC: 2.1 MG/DL — LOW (ref 2.5–4.5)
PHOSPHATE SERPL-MCNC: 2.2 MG/DL — LOW (ref 2.5–4.5)
PHOSPHATE SERPL-MCNC: 2.4 MG/DL — LOW (ref 2.5–4.5)
PHOSPHATE SERPL-MCNC: 2.4 MG/DL — LOW (ref 2.5–4.5)
PHOSPHATE SERPL-MCNC: 2.5 MG/DL — SIGNIFICANT CHANGE UP (ref 2.5–4.5)
PHOSPHATE SERPL-MCNC: 2.5 MG/DL — SIGNIFICANT CHANGE UP (ref 2.5–4.5)
PHOSPHATE SERPL-MCNC: 2.6 MG/DL — SIGNIFICANT CHANGE UP (ref 2.5–4.5)
PHOSPHATE SERPL-MCNC: 2.8 MG/DL — SIGNIFICANT CHANGE UP (ref 2.5–4.5)
PHOSPHATE SERPL-MCNC: 2.9 MG/DL — SIGNIFICANT CHANGE UP (ref 2.5–4.5)
PHOSPHATE SERPL-MCNC: 3 MG/DL — SIGNIFICANT CHANGE UP (ref 2.5–4.5)
PHOSPHATE SERPL-MCNC: 3.1 MG/DL — SIGNIFICANT CHANGE UP (ref 2.5–4.5)
PHOSPHATE SERPL-MCNC: 3.2 MG/DL — SIGNIFICANT CHANGE UP (ref 2.5–4.5)
PHOSPHATE SERPL-MCNC: 3.5 MG/DL — SIGNIFICANT CHANGE UP (ref 2.5–4.5)
PHOSPHATE SERPL-MCNC: 3.5 MG/DL — SIGNIFICANT CHANGE UP (ref 2.5–4.5)
PHOSPHATE SERPL-MCNC: 3.6 MG/DL — SIGNIFICANT CHANGE UP (ref 2.5–4.5)
PHOSPHATE SERPL-MCNC: 4.1 MG/DL — SIGNIFICANT CHANGE UP (ref 2.5–4.5)
PHOSPHATE SERPL-MCNC: 5.8 MG/DL — HIGH (ref 2.5–4.5)
PLAT MORPH BLD: NORMAL — SIGNIFICANT CHANGE UP
PLAT MORPH BLD: NORMAL — SIGNIFICANT CHANGE UP
PLATELET # BLD AUTO: 155 K/UL — SIGNIFICANT CHANGE UP (ref 150–400)
PLATELET # BLD AUTO: 160 K/UL — SIGNIFICANT CHANGE UP (ref 150–400)
PLATELET # BLD AUTO: 161 K/UL — SIGNIFICANT CHANGE UP (ref 150–400)
PLATELET # BLD AUTO: 163 K/UL — SIGNIFICANT CHANGE UP (ref 150–400)
PLATELET # BLD AUTO: 173 K/UL — SIGNIFICANT CHANGE UP (ref 150–400)
PLATELET # BLD AUTO: 174 K/UL — SIGNIFICANT CHANGE UP (ref 150–400)
PLATELET # BLD AUTO: 175 K/UL — SIGNIFICANT CHANGE UP (ref 150–400)
PLATELET # BLD AUTO: 176 K/UL — SIGNIFICANT CHANGE UP (ref 150–400)
PLATELET # BLD AUTO: 178 K/UL — SIGNIFICANT CHANGE UP (ref 150–400)
PLATELET # BLD AUTO: 179 K/UL — SIGNIFICANT CHANGE UP (ref 150–400)
PLATELET # BLD AUTO: 185 K/UL — SIGNIFICANT CHANGE UP (ref 150–400)
PLATELET # BLD AUTO: 191 K/UL — SIGNIFICANT CHANGE UP (ref 150–400)
PLATELET # BLD AUTO: 192 K/UL — SIGNIFICANT CHANGE UP (ref 150–400)
PLATELET # BLD AUTO: 193 K/UL — SIGNIFICANT CHANGE UP (ref 150–400)
PLATELET # BLD AUTO: 194 K/UL — SIGNIFICANT CHANGE UP (ref 150–400)
PLATELET # BLD AUTO: 199 K/UL — SIGNIFICANT CHANGE UP (ref 150–400)
PLATELET # BLD AUTO: 201 K/UL — SIGNIFICANT CHANGE UP (ref 150–400)
PLATELET # BLD AUTO: 207 K/UL — SIGNIFICANT CHANGE UP (ref 150–400)
PLATELET # BLD AUTO: 214 K/UL — SIGNIFICANT CHANGE UP (ref 150–400)
PLATELET # BLD AUTO: 214 K/UL — SIGNIFICANT CHANGE UP (ref 150–400)
PLATELET # BLD AUTO: 216 K/UL — SIGNIFICANT CHANGE UP (ref 150–400)
PLATELET # BLD AUTO: 224 K/UL — SIGNIFICANT CHANGE UP (ref 150–400)
PLATELET # BLD AUTO: 245 K/UL — SIGNIFICANT CHANGE UP (ref 150–400)
POIKILOCYTOSIS BLD QL AUTO: SIGNIFICANT CHANGE UP
POIKILOCYTOSIS BLD QL AUTO: SLIGHT — SIGNIFICANT CHANGE UP
POLYCHROMASIA BLD QL SMEAR: SLIGHT — SIGNIFICANT CHANGE UP
POLYCHROMASIA BLD QL SMEAR: SLIGHT — SIGNIFICANT CHANGE UP
POTASSIUM SERPL-MCNC: 4 MMOL/L — SIGNIFICANT CHANGE UP (ref 3.5–5.3)
POTASSIUM SERPL-MCNC: 4.2 MMOL/L — SIGNIFICANT CHANGE UP (ref 3.5–5.3)
POTASSIUM SERPL-MCNC: 4.3 MMOL/L — SIGNIFICANT CHANGE UP (ref 3.5–5.3)
POTASSIUM SERPL-MCNC: 4.3 MMOL/L — SIGNIFICANT CHANGE UP (ref 3.5–5.3)
POTASSIUM SERPL-MCNC: 4.4 MMOL/L — SIGNIFICANT CHANGE UP (ref 3.5–5.3)
POTASSIUM SERPL-MCNC: 4.5 MMOL/L — SIGNIFICANT CHANGE UP (ref 3.5–5.3)
POTASSIUM SERPL-MCNC: 4.6 MMOL/L — SIGNIFICANT CHANGE UP (ref 3.5–5.3)
POTASSIUM SERPL-MCNC: 4.7 MMOL/L — SIGNIFICANT CHANGE UP (ref 3.5–5.3)
POTASSIUM SERPL-MCNC: 4.8 MMOL/L — SIGNIFICANT CHANGE UP (ref 3.5–5.3)
POTASSIUM SERPL-MCNC: 4.8 MMOL/L — SIGNIFICANT CHANGE UP (ref 3.5–5.3)
POTASSIUM SERPL-MCNC: 4.9 MMOL/L — SIGNIFICANT CHANGE UP (ref 3.5–5.3)
POTASSIUM SERPL-MCNC: 4.9 MMOL/L — SIGNIFICANT CHANGE UP (ref 3.5–5.3)
POTASSIUM SERPL-MCNC: 5 MMOL/L — SIGNIFICANT CHANGE UP (ref 3.5–5.3)
POTASSIUM SERPL-MCNC: 5.2 MMOL/L — SIGNIFICANT CHANGE UP (ref 3.5–5.3)
POTASSIUM SERPL-MCNC: 5.3 MMOL/L — SIGNIFICANT CHANGE UP (ref 3.5–5.3)
POTASSIUM SERPL-MCNC: 5.4 MMOL/L — HIGH (ref 3.5–5.3)
POTASSIUM SERPL-MCNC: 5.4 MMOL/L — HIGH (ref 3.5–5.3)
POTASSIUM SERPL-MCNC: 5.5 MMOL/L — HIGH (ref 3.5–5.3)
POTASSIUM SERPL-MCNC: 5.9 MMOL/L — HIGH (ref 3.5–5.3)
POTASSIUM SERPL-MCNC: 5.9 MMOL/L — HIGH (ref 3.5–5.3)
POTASSIUM SERPL-MCNC: 6.6 MMOL/L — CRITICAL HIGH (ref 3.5–5.3)
POTASSIUM SERPL-MCNC: SIGNIFICANT CHANGE UP MMOL/L (ref 3.5–5.3)
POTASSIUM SERPL-SCNC: 4 MMOL/L — SIGNIFICANT CHANGE UP (ref 3.5–5.3)
POTASSIUM SERPL-SCNC: 4.2 MMOL/L — SIGNIFICANT CHANGE UP (ref 3.5–5.3)
POTASSIUM SERPL-SCNC: 4.3 MMOL/L — SIGNIFICANT CHANGE UP (ref 3.5–5.3)
POTASSIUM SERPL-SCNC: 4.3 MMOL/L — SIGNIFICANT CHANGE UP (ref 3.5–5.3)
POTASSIUM SERPL-SCNC: 4.4 MMOL/L — SIGNIFICANT CHANGE UP (ref 3.5–5.3)
POTASSIUM SERPL-SCNC: 4.5 MMOL/L — SIGNIFICANT CHANGE UP (ref 3.5–5.3)
POTASSIUM SERPL-SCNC: 4.6 MMOL/L — SIGNIFICANT CHANGE UP (ref 3.5–5.3)
POTASSIUM SERPL-SCNC: 4.7 MMOL/L — SIGNIFICANT CHANGE UP (ref 3.5–5.3)
POTASSIUM SERPL-SCNC: 4.8 MMOL/L — SIGNIFICANT CHANGE UP (ref 3.5–5.3)
POTASSIUM SERPL-SCNC: 4.8 MMOL/L — SIGNIFICANT CHANGE UP (ref 3.5–5.3)
POTASSIUM SERPL-SCNC: 4.9 MMOL/L — SIGNIFICANT CHANGE UP (ref 3.5–5.3)
POTASSIUM SERPL-SCNC: 4.9 MMOL/L — SIGNIFICANT CHANGE UP (ref 3.5–5.3)
POTASSIUM SERPL-SCNC: 5 MMOL/L — SIGNIFICANT CHANGE UP (ref 3.5–5.3)
POTASSIUM SERPL-SCNC: 5.2 MMOL/L — SIGNIFICANT CHANGE UP (ref 3.5–5.3)
POTASSIUM SERPL-SCNC: 5.3 MMOL/L — SIGNIFICANT CHANGE UP (ref 3.5–5.3)
POTASSIUM SERPL-SCNC: 5.4 MMOL/L — HIGH (ref 3.5–5.3)
POTASSIUM SERPL-SCNC: 5.4 MMOL/L — HIGH (ref 3.5–5.3)
POTASSIUM SERPL-SCNC: 5.5 MMOL/L — HIGH (ref 3.5–5.3)
POTASSIUM SERPL-SCNC: 5.9 MMOL/L — HIGH (ref 3.5–5.3)
POTASSIUM SERPL-SCNC: 5.9 MMOL/L — HIGH (ref 3.5–5.3)
POTASSIUM SERPL-SCNC: 6.6 MMOL/L — CRITICAL HIGH (ref 3.5–5.3)
POTASSIUM SERPL-SCNC: SIGNIFICANT CHANGE UP MMOL/L (ref 3.5–5.3)
POTASSIUM UR-SCNC: 26 MMOL/L — SIGNIFICANT CHANGE UP
POTASSIUM UR-SCNC: 44 MMOL/L — SIGNIFICANT CHANGE UP
POTASSIUM UR-SCNC: 45 MMOL/L — SIGNIFICANT CHANGE UP
PROT ?TM UR-MCNC: 24 MG/DL — HIGH (ref 0–12)
PROT SERPL-MCNC: 5.2 G/DL — LOW (ref 6–8.3)
PROT SERPL-MCNC: 5.5 G/DL — LOW (ref 6–8.3)
PROT SERPL-MCNC: 5.9 G/DL — LOW (ref 6–8.3)
PROT SERPL-MCNC: 6.1 G/DL — SIGNIFICANT CHANGE UP (ref 6–8.3)
PROT SERPL-MCNC: 6.1 G/DL — SIGNIFICANT CHANGE UP (ref 6–8.3)
PROT SERPL-MCNC: 6.3 G/DL — SIGNIFICANT CHANGE UP (ref 6–8.3)
PROT SERPL-MCNC: 6.4 G/DL — SIGNIFICANT CHANGE UP (ref 6–8.3)
PROT SERPL-MCNC: 6.4 G/DL — SIGNIFICANT CHANGE UP (ref 6–8.3)
PROT SERPL-MCNC: 6.5 G/DL — SIGNIFICANT CHANGE UP (ref 6–8.3)
PROT SERPL-MCNC: 6.6 G/DL — SIGNIFICANT CHANGE UP (ref 6–8.3)
PROT SERPL-MCNC: 6.7 G/DL — SIGNIFICANT CHANGE UP (ref 6–8.3)
PROT SERPL-MCNC: 6.7 G/DL — SIGNIFICANT CHANGE UP (ref 6–8.3)
PROT SERPL-MCNC: 6.8 G/DL — SIGNIFICANT CHANGE UP (ref 6–8.3)
PROT SERPL-MCNC: 6.9 G/DL — SIGNIFICANT CHANGE UP (ref 6–8.3)
PROT SERPL-MCNC: 7 G/DL — SIGNIFICANT CHANGE UP (ref 6–8.3)
PROT SERPL-MCNC: 7.9 G/DL — SIGNIFICANT CHANGE UP (ref 6–8.3)
PROT UR-MCNC: SIGNIFICANT CHANGE UP MG/DL
PROT/CREAT UR-RTO: 0.3 RATIO — HIGH (ref 0–0.2)
PROTHROM AB SERPL-ACNC: 10.8 SEC — SIGNIFICANT CHANGE UP (ref 9.5–13)
PROTHROM AB SERPL-ACNC: 11.5 SEC — SIGNIFICANT CHANGE UP (ref 9.5–13)
PROTHROM AB SERPL-ACNC: 12.1 SEC — SIGNIFICANT CHANGE UP (ref 9.5–13)
PROTHROM AB SERPL-ACNC: 17.6 SEC — HIGH (ref 9.5–13)
RBC # BLD: 2.06 M/UL — LOW (ref 4.2–5.8)
RBC # BLD: 2.33 M/UL — LOW (ref 4.2–5.8)
RBC # BLD: 2.43 M/UL — LOW (ref 4.2–5.8)
RBC # BLD: 2.49 M/UL — LOW (ref 4.2–5.8)
RBC # BLD: 2.76 M/UL — LOW (ref 4.2–5.8)
RBC # BLD: 2.81 M/UL — LOW (ref 4.2–5.8)
RBC # BLD: 2.9 M/UL — LOW (ref 4.2–5.8)
RBC # BLD: 2.91 M/UL — LOW (ref 4.2–5.8)
RBC # BLD: 3.38 M/UL — LOW (ref 4.2–5.8)
RBC # BLD: 3.47 M/UL — LOW (ref 4.2–5.8)
RBC # BLD: 3.49 M/UL — LOW (ref 4.2–5.8)
RBC # BLD: 3.52 M/UL — LOW (ref 4.2–5.8)
RBC # BLD: 3.55 M/UL — LOW (ref 4.2–5.8)
RBC # BLD: 3.62 M/UL — LOW (ref 4.2–5.8)
RBC # BLD: 3.64 M/UL — LOW (ref 4.2–5.8)
RBC # BLD: 3.65 M/UL — LOW (ref 4.2–5.8)
RBC # BLD: 3.65 M/UL — LOW (ref 4.2–5.8)
RBC # BLD: 3.73 M/UL — LOW (ref 4.2–5.8)
RBC # BLD: 4.01 M/UL — LOW (ref 4.2–5.8)
RBC # BLD: 4.11 M/UL — LOW (ref 4.2–5.8)
RBC # BLD: 4.17 M/UL — LOW (ref 4.2–5.8)
RBC # BLD: 4.32 M/UL — SIGNIFICANT CHANGE UP (ref 4.2–5.8)
RBC # BLD: 4.35 M/UL — SIGNIFICANT CHANGE UP (ref 4.2–5.8)
RBC # FLD: 15.5 % — HIGH (ref 10.3–14.5)
RBC # FLD: 15.6 % — HIGH (ref 10.3–14.5)
RBC # FLD: 15.7 % — HIGH (ref 10.3–14.5)
RBC # FLD: 16.4 % — HIGH (ref 10.3–14.5)
RBC # FLD: 16.8 % — HIGH (ref 10.3–14.5)
RBC # FLD: 16.8 % — HIGH (ref 10.3–14.5)
RBC # FLD: 17.7 % — HIGH (ref 10.3–14.5)
RBC # FLD: 17.8 % — HIGH (ref 10.3–14.5)
RBC # FLD: 18.3 % — HIGH (ref 10.3–14.5)
RBC # FLD: 18.5 % — HIGH (ref 10.3–14.5)
RBC # FLD: 18.9 % — HIGH (ref 10.3–14.5)
RBC # FLD: 19.6 % — HIGH (ref 10.3–14.5)
RBC # FLD: 19.9 % — HIGH (ref 10.3–14.5)
RBC # FLD: 20.2 % — HIGH (ref 10.3–14.5)
RBC # FLD: 20.6 % — HIGH (ref 10.3–14.5)
RBC # FLD: 21.5 % — HIGH (ref 10.3–14.5)
RBC # FLD: 21.9 % — HIGH (ref 10.3–14.5)
RBC # FLD: 22 % — HIGH (ref 10.3–14.5)
RBC # FLD: 22.9 % — HIGH (ref 10.3–14.5)
RBC # FLD: 23.3 % — HIGH (ref 10.3–14.5)
RBC # FLD: 23.9 % — HIGH (ref 10.3–14.5)
RBC # FLD: 24.7 % — HIGH (ref 10.3–14.5)
RBC # FLD: 24.9 % — HIGH (ref 10.3–14.5)
RBC BLD AUTO: ABNORMAL
RBC BLD AUTO: ABNORMAL
RH IG SCN BLD-IMP: POSITIVE — SIGNIFICANT CHANGE UP
SCHISTOCYTES BLD QL AUTO: SLIGHT — SIGNIFICANT CHANGE UP
SODIUM SERPL-SCNC: 123 MMOL/L — LOW (ref 135–145)
SODIUM SERPL-SCNC: 124 MMOL/L — LOW (ref 135–145)
SODIUM SERPL-SCNC: 125 MMOL/L — LOW (ref 135–145)
SODIUM SERPL-SCNC: 126 MMOL/L — LOW (ref 135–145)
SODIUM SERPL-SCNC: 127 MMOL/L — LOW (ref 135–145)
SODIUM SERPL-SCNC: 128 MMOL/L — LOW (ref 135–145)
SODIUM SERPL-SCNC: 129 MMOL/L — LOW (ref 135–145)
SODIUM SERPL-SCNC: 130 MMOL/L — LOW (ref 135–145)
SODIUM SERPL-SCNC: 131 MMOL/L — LOW (ref 135–145)
SODIUM SERPL-SCNC: 134 MMOL/L — LOW (ref 135–145)
SODIUM SERPL-SCNC: 137 MMOL/L — SIGNIFICANT CHANGE UP (ref 135–145)
SODIUM SERPL-SCNC: 143 MMOL/L — SIGNIFICANT CHANGE UP (ref 135–145)
SODIUM SERPL-SCNC: 145 MMOL/L — SIGNIFICANT CHANGE UP (ref 135–145)
SODIUM UR-SCNC: 122 MMOL/L — SIGNIFICANT CHANGE UP
SODIUM UR-SCNC: 18 MMOL/L — SIGNIFICANT CHANGE UP
SODIUM UR-SCNC: 32 MMOL/L — SIGNIFICANT CHANGE UP
SODIUM UR-SCNC: 68 MMOL/L — SIGNIFICANT CHANGE UP
SODIUM UR-SCNC: 8 MMOL/L — SIGNIFICANT CHANGE UP
SODIUM UR-SCNC: <5 MMOL/L — SIGNIFICANT CHANGE UP
SP GR SPEC: 1.01 — SIGNIFICANT CHANGE UP (ref 1–1.03)
TARGETS BLD QL SMEAR: SLIGHT — SIGNIFICANT CHANGE UP
TARGETS BLD QL SMEAR: SLIGHT — SIGNIFICANT CHANGE UP
TROPONIN T, HIGH SENSITIVITY RESULT: 107 NG/L — HIGH (ref 0–51)
UROBILINOGEN FLD QL: 1 MG/DL — SIGNIFICANT CHANGE UP (ref 0.2–1)
UUN UR-MCNC: 553 MG/DL — SIGNIFICANT CHANGE UP
UUN UR-MCNC: 661 MG/DL — SIGNIFICANT CHANGE UP
WBC # BLD: 10.28 K/UL — SIGNIFICANT CHANGE UP (ref 3.8–10.5)
WBC # BLD: 10.34 K/UL — SIGNIFICANT CHANGE UP (ref 3.8–10.5)
WBC # BLD: 10.87 K/UL — HIGH (ref 3.8–10.5)
WBC # BLD: 11.12 K/UL — HIGH (ref 3.8–10.5)
WBC # BLD: 11.37 K/UL — HIGH (ref 3.8–10.5)
WBC # BLD: 12.78 K/UL — HIGH (ref 3.8–10.5)
WBC # BLD: 13.86 K/UL — HIGH (ref 3.8–10.5)
WBC # BLD: 15.78 K/UL — HIGH (ref 3.8–10.5)
WBC # BLD: 6.11 K/UL — SIGNIFICANT CHANGE UP (ref 3.8–10.5)
WBC # BLD: 6.4 K/UL — SIGNIFICANT CHANGE UP (ref 3.8–10.5)
WBC # BLD: 6.75 K/UL — SIGNIFICANT CHANGE UP (ref 3.8–10.5)
WBC # BLD: 6.98 K/UL — SIGNIFICANT CHANGE UP (ref 3.8–10.5)
WBC # BLD: 7.18 K/UL — SIGNIFICANT CHANGE UP (ref 3.8–10.5)
WBC # BLD: 7.19 K/UL — SIGNIFICANT CHANGE UP (ref 3.8–10.5)
WBC # BLD: 7.33 K/UL — SIGNIFICANT CHANGE UP (ref 3.8–10.5)
WBC # BLD: 8.32 K/UL — SIGNIFICANT CHANGE UP (ref 3.8–10.5)
WBC # BLD: 8.63 K/UL — SIGNIFICANT CHANGE UP (ref 3.8–10.5)
WBC # BLD: 8.68 K/UL — SIGNIFICANT CHANGE UP (ref 3.8–10.5)
WBC # BLD: 8.76 K/UL — SIGNIFICANT CHANGE UP (ref 3.8–10.5)
WBC # BLD: 8.8 K/UL — SIGNIFICANT CHANGE UP (ref 3.8–10.5)
WBC # BLD: 9.19 K/UL — SIGNIFICANT CHANGE UP (ref 3.8–10.5)
WBC # BLD: 9.32 K/UL — SIGNIFICANT CHANGE UP (ref 3.8–10.5)
WBC # BLD: 9.4 K/UL — SIGNIFICANT CHANGE UP (ref 3.8–10.5)
WBC # FLD AUTO: 10.28 K/UL — SIGNIFICANT CHANGE UP (ref 3.8–10.5)
WBC # FLD AUTO: 10.34 K/UL — SIGNIFICANT CHANGE UP (ref 3.8–10.5)
WBC # FLD AUTO: 10.87 K/UL — HIGH (ref 3.8–10.5)
WBC # FLD AUTO: 11.12 K/UL — HIGH (ref 3.8–10.5)
WBC # FLD AUTO: 11.37 K/UL — HIGH (ref 3.8–10.5)
WBC # FLD AUTO: 12.78 K/UL — HIGH (ref 3.8–10.5)
WBC # FLD AUTO: 13.86 K/UL — HIGH (ref 3.8–10.5)
WBC # FLD AUTO: 15.78 K/UL — HIGH (ref 3.8–10.5)
WBC # FLD AUTO: 6.11 K/UL — SIGNIFICANT CHANGE UP (ref 3.8–10.5)
WBC # FLD AUTO: 6.4 K/UL — SIGNIFICANT CHANGE UP (ref 3.8–10.5)
WBC # FLD AUTO: 6.75 K/UL — SIGNIFICANT CHANGE UP (ref 3.8–10.5)
WBC # FLD AUTO: 6.98 K/UL — SIGNIFICANT CHANGE UP (ref 3.8–10.5)
WBC # FLD AUTO: 7.18 K/UL — SIGNIFICANT CHANGE UP (ref 3.8–10.5)
WBC # FLD AUTO: 7.19 K/UL — SIGNIFICANT CHANGE UP (ref 3.8–10.5)
WBC # FLD AUTO: 7.33 K/UL — SIGNIFICANT CHANGE UP (ref 3.8–10.5)
WBC # FLD AUTO: 8.32 K/UL — SIGNIFICANT CHANGE UP (ref 3.8–10.5)
WBC # FLD AUTO: 8.63 K/UL — SIGNIFICANT CHANGE UP (ref 3.8–10.5)
WBC # FLD AUTO: 8.68 K/UL — SIGNIFICANT CHANGE UP (ref 3.8–10.5)
WBC # FLD AUTO: 8.76 K/UL — SIGNIFICANT CHANGE UP (ref 3.8–10.5)
WBC # FLD AUTO: 8.8 K/UL — SIGNIFICANT CHANGE UP (ref 3.8–10.5)
WBC # FLD AUTO: 9.19 K/UL — SIGNIFICANT CHANGE UP (ref 3.8–10.5)
WBC # FLD AUTO: 9.32 K/UL — SIGNIFICANT CHANGE UP (ref 3.8–10.5)
WBC # FLD AUTO: 9.4 K/UL — SIGNIFICANT CHANGE UP (ref 3.8–10.5)

## 2024-01-01 PROCEDURE — 96372 THER/PROPH/DIAG INJ SC/IM: CPT

## 2024-01-01 PROCEDURE — 99285 EMERGENCY DEPT VISIT HI MDM: CPT | Mod: 25

## 2024-01-01 PROCEDURE — 82435 ASSAY OF BLOOD CHLORIDE: CPT

## 2024-01-01 PROCEDURE — 99232 SBSQ HOSP IP/OBS MODERATE 35: CPT

## 2024-01-01 PROCEDURE — 99233 SBSQ HOSP IP/OBS HIGH 50: CPT | Mod: GC

## 2024-01-01 PROCEDURE — 99233 SBSQ HOSP IP/OBS HIGH 50: CPT

## 2024-01-01 PROCEDURE — 99232 SBSQ HOSP IP/OBS MODERATE 35: CPT | Mod: GC

## 2024-01-01 PROCEDURE — 71275 CT ANGIOGRAPHY CHEST: CPT | Mod: MC

## 2024-01-01 PROCEDURE — 76882 US LMTD JT/FCL EVL NVASC XTR: CPT

## 2024-01-01 PROCEDURE — 99498 ADVNCD CARE PLAN ADDL 30 MIN: CPT | Mod: 25

## 2024-01-01 PROCEDURE — 76705 ECHO EXAM OF ABDOMEN: CPT | Mod: 26,59

## 2024-01-01 PROCEDURE — 99222 1ST HOSP IP/OBS MODERATE 55: CPT | Mod: GC

## 2024-01-01 PROCEDURE — 76882 US LMTD JT/FCL EVL NVASC XTR: CPT | Mod: 26,LT

## 2024-01-01 PROCEDURE — 82436 ASSAY OF URINE CHLORIDE: CPT

## 2024-01-01 PROCEDURE — 74019 RADEX ABDOMEN 2 VIEWS: CPT | Mod: 26

## 2024-01-01 PROCEDURE — 76705 ECHO EXAM OF ABDOMEN: CPT

## 2024-01-01 PROCEDURE — 74177 CT ABD & PELVIS W/CONTRAST: CPT | Mod: 26,MC

## 2024-01-01 PROCEDURE — 86850 RBC ANTIBODY SCREEN: CPT

## 2024-01-01 PROCEDURE — 83880 ASSAY OF NATRIURETIC PEPTIDE: CPT

## 2024-01-01 PROCEDURE — 82248 BILIRUBIN DIRECT: CPT

## 2024-01-01 PROCEDURE — 84540 ASSAY OF URINE/UREA-N: CPT

## 2024-01-01 PROCEDURE — 88341 IMHCHEM/IMCYTCHM EA ADD ANTB: CPT | Mod: 26,59

## 2024-01-01 PROCEDURE — 82570 ASSAY OF URINE CREATININE: CPT

## 2024-01-01 PROCEDURE — 97161 PT EVAL LOW COMPLEX 20 MIN: CPT

## 2024-01-01 PROCEDURE — 82962 GLUCOSE BLOOD TEST: CPT

## 2024-01-01 PROCEDURE — 83735 ASSAY OF MAGNESIUM: CPT

## 2024-01-01 PROCEDURE — 85610 PROTHROMBIN TIME: CPT

## 2024-01-01 PROCEDURE — 76705 ECHO EXAM OF ABDOMEN: CPT | Mod: 26

## 2024-01-01 PROCEDURE — 96374 THER/PROPH/DIAG INJ IV PUSH: CPT

## 2024-01-01 PROCEDURE — 86901 BLOOD TYPING SEROLOGIC RH(D): CPT

## 2024-01-01 PROCEDURE — 83935 ASSAY OF URINE OSMOLALITY: CPT

## 2024-01-01 PROCEDURE — 93306 TTE W/DOPPLER COMPLETE: CPT | Mod: 26

## 2024-01-01 PROCEDURE — 74182 MRI ABDOMEN W/CONTRAST: CPT | Mod: 26

## 2024-01-01 PROCEDURE — 36415 COLL VENOUS BLD VENIPUNCTURE: CPT

## 2024-01-01 PROCEDURE — 88341 IMHCHEM/IMCYTCHM EA ADD ANTB: CPT

## 2024-01-01 PROCEDURE — 86923 COMPATIBILITY TEST ELECTRIC: CPT

## 2024-01-01 PROCEDURE — 84133 ASSAY OF URINE POTASSIUM: CPT

## 2024-01-01 PROCEDURE — 82803 BLOOD GASES ANY COMBINATION: CPT

## 2024-01-01 PROCEDURE — 99285 EMERGENCY DEPT VISIT HI MDM: CPT

## 2024-01-01 PROCEDURE — 74182 MRI ABDOMEN W/CONTRAST: CPT | Mod: MC

## 2024-01-01 PROCEDURE — 47000 NEEDLE BIOPSY OF LIVER PERQ: CPT

## 2024-01-01 PROCEDURE — 85018 HEMOGLOBIN: CPT

## 2024-01-01 PROCEDURE — P9016: CPT

## 2024-01-01 PROCEDURE — 88360 TUMOR IMMUNOHISTOCHEM/MANUAL: CPT

## 2024-01-01 PROCEDURE — 97166 OT EVAL MOD COMPLEX 45 MIN: CPT

## 2024-01-01 PROCEDURE — 82330 ASSAY OF CALCIUM: CPT

## 2024-01-01 PROCEDURE — C8929: CPT

## 2024-01-01 PROCEDURE — 96375 TX/PRO/DX INJ NEW DRUG ADDON: CPT

## 2024-01-01 PROCEDURE — 80053 COMPREHEN METABOLIC PANEL: CPT

## 2024-01-01 PROCEDURE — 71275 CT ANGIOGRAPHY CHEST: CPT | Mod: 26,MC

## 2024-01-01 PROCEDURE — 86706 HEP B SURFACE ANTIBODY: CPT

## 2024-01-01 PROCEDURE — 81003 URINALYSIS AUTO W/O SCOPE: CPT

## 2024-01-01 PROCEDURE — 84156 ASSAY OF PROTEIN URINE: CPT

## 2024-01-01 PROCEDURE — 71045 X-RAY EXAM CHEST 1 VIEW: CPT | Mod: 26

## 2024-01-01 PROCEDURE — 88360 TUMOR IMMUNOHISTOCHEM/MANUAL: CPT | Mod: 26

## 2024-01-01 PROCEDURE — 80048 BASIC METABOLIC PNL TOTAL CA: CPT

## 2024-01-01 PROCEDURE — 76700 US EXAM ABDOM COMPLETE: CPT

## 2024-01-01 PROCEDURE — 83690 ASSAY OF LIPASE: CPT

## 2024-01-01 PROCEDURE — 84484 ASSAY OF TROPONIN QUANT: CPT

## 2024-01-01 PROCEDURE — 88342 IMHCHEM/IMCYTCHM 1ST ANTB: CPT

## 2024-01-01 PROCEDURE — 82247 BILIRUBIN TOTAL: CPT

## 2024-01-01 PROCEDURE — 88307 TISSUE EXAM BY PATHOLOGIST: CPT

## 2024-01-01 PROCEDURE — 93010 ELECTROCARDIOGRAM REPORT: CPT

## 2024-01-01 PROCEDURE — 76942 ECHO GUIDE FOR BIOPSY: CPT | Mod: 26

## 2024-01-01 PROCEDURE — 80074 ACUTE HEPATITIS PANEL: CPT

## 2024-01-01 PROCEDURE — 74177 CT ABD & PELVIS W/CONTRAST: CPT | Mod: MC

## 2024-01-01 PROCEDURE — 84132 ASSAY OF SERUM POTASSIUM: CPT

## 2024-01-01 PROCEDURE — 93975 VASCULAR STUDY: CPT

## 2024-01-01 PROCEDURE — 85730 THROMBOPLASTIN TIME PARTIAL: CPT

## 2024-01-01 PROCEDURE — 84295 ASSAY OF SERUM SODIUM: CPT

## 2024-01-01 PROCEDURE — 85014 HEMATOCRIT: CPT

## 2024-01-01 PROCEDURE — 74019 RADEX ABDOMEN 2 VIEWS: CPT

## 2024-01-01 PROCEDURE — 88307 TISSUE EXAM BY PATHOLOGIST: CPT | Mod: 26

## 2024-01-01 PROCEDURE — 71046 X-RAY EXAM CHEST 2 VIEWS: CPT

## 2024-01-01 PROCEDURE — 93005 ELECTROCARDIOGRAM TRACING: CPT

## 2024-01-01 PROCEDURE — 93975 VASCULAR STUDY: CPT | Mod: 26

## 2024-01-01 PROCEDURE — 87389 HIV-1 AG W/HIV-1&-2 AB AG IA: CPT

## 2024-01-01 PROCEDURE — 99291 CRITICAL CARE FIRST HOUR: CPT | Mod: GC

## 2024-01-01 PROCEDURE — 84300 ASSAY OF URINE SODIUM: CPT

## 2024-01-01 PROCEDURE — 99497 ADVNCD CARE PLAN 30 MIN: CPT | Mod: 25

## 2024-01-01 PROCEDURE — 83605 ASSAY OF LACTIC ACID: CPT

## 2024-01-01 PROCEDURE — 99231 SBSQ HOSP IP/OBS SF/LOW 25: CPT

## 2024-01-01 PROCEDURE — 83930 ASSAY OF BLOOD OSMOLALITY: CPT

## 2024-01-01 PROCEDURE — 36430 TRANSFUSION BLD/BLD COMPNT: CPT

## 2024-01-01 PROCEDURE — P9040: CPT

## 2024-01-01 PROCEDURE — 86900 BLOOD TYPING SEROLOGIC ABO: CPT

## 2024-01-01 PROCEDURE — 85025 COMPLETE CBC W/AUTO DIFF WBC: CPT

## 2024-01-01 PROCEDURE — 84100 ASSAY OF PHOSPHORUS: CPT

## 2024-01-01 PROCEDURE — 76942 ECHO GUIDE FOR BIOPSY: CPT

## 2024-01-01 PROCEDURE — 85027 COMPLETE CBC AUTOMATED: CPT

## 2024-01-01 PROCEDURE — 71045 X-RAY EXAM CHEST 1 VIEW: CPT

## 2024-01-01 PROCEDURE — A9585: CPT

## 2024-01-01 PROCEDURE — 71046 X-RAY EXAM CHEST 2 VIEWS: CPT | Mod: 26

## 2024-01-01 PROCEDURE — 88342 IMHCHEM/IMCYTCHM 1ST ANTB: CPT | Mod: 26,59

## 2024-01-01 PROCEDURE — 99223 1ST HOSP IP/OBS HIGH 75: CPT | Mod: GC

## 2024-01-01 PROCEDURE — 82947 ASSAY GLUCOSE BLOOD QUANT: CPT

## 2024-01-01 RX ORDER — INSULIN REGULAR, HUMAN 100/ML (3)
5 INSULIN PEN (ML) SUBCUTANEOUS ONCE
Refills: 0 | Status: COMPLETED | OUTPATIENT
Start: 2024-01-01 | End: 2024-01-01

## 2024-01-01 RX ORDER — SODIUM ZIRCONIUM CYCLOSILICATE 5 G/5G
5 POWDER, FOR SUSPENSION ORAL ONCE
Refills: 0 | Status: COMPLETED | OUTPATIENT
Start: 2024-01-01 | End: 2024-01-01

## 2024-01-01 RX ORDER — FINASTERIDE 1 MG/1
1 TABLET, FILM COATED ORAL
Refills: 0 | DISCHARGE

## 2024-01-01 RX ORDER — SODIUM BICARBONATE 84 MG/ML
650 INJECTION, SOLUTION INTRAVENOUS EVERY 12 HOURS
Refills: 0 | Status: DISCONTINUED | OUTPATIENT
Start: 2024-01-01 | End: 2024-01-01

## 2024-01-01 RX ORDER — PANTOPRAZOLE SODIUM 40 MG
80 TABLET, DELAYED RELEASE (ENTERIC COATED) ORAL ONCE
Refills: 0 | Status: COMPLETED | OUTPATIENT
Start: 2024-01-01 | End: 2024-01-01

## 2024-01-01 RX ORDER — ENOXAPARIN SODIUM 100 MG/ML
40 INJECTION SUBCUTANEOUS EVERY 24 HOURS
Refills: 0 | Status: COMPLETED | OUTPATIENT
Start: 2024-01-01 | End: 2024-01-01

## 2024-01-01 RX ORDER — TAMSULOSIN HYDROCHLORIDE 0.4 MG/1
0.4 CAPSULE ORAL AT BEDTIME
Refills: 0 | Status: DISCONTINUED | OUTPATIENT
Start: 2024-01-01 | End: 2024-01-01

## 2024-01-01 RX ORDER — SODIUM CHLORIDE 9 MG/ML
500 INJECTION INTRAMUSCULAR; INTRAVENOUS; SUBCUTANEOUS ONCE
Refills: 0 | Status: COMPLETED | OUTPATIENT
Start: 2024-01-01 | End: 2024-01-01

## 2024-01-01 RX ORDER — CARVEDILOL 6.25 MG/1
3.12 TABLET ORAL ONCE
Refills: 0 | Status: COMPLETED | OUTPATIENT
Start: 2024-01-01 | End: 2024-01-01

## 2024-01-01 RX ORDER — SODIUM CHLORIDE 3 G/100ML
300 INJECTION, SOLUTION INTRAVENOUS
Refills: 0 | Status: DISCONTINUED | OUTPATIENT
Start: 2024-01-01 | End: 2024-01-01

## 2024-01-01 RX ORDER — POLYETHYLENE GLYCOL 3350 17 G/17G
17 POWDER, FOR SOLUTION ORAL DAILY
Refills: 0 | Status: DISCONTINUED | OUTPATIENT
Start: 2024-01-01 | End: 2024-01-01

## 2024-01-01 RX ORDER — SODIUM PHOSPHATE, MONOBASIC, MONOHYDRATE AND SODIUM PHOSPHATE, DIBASIC ANHYDROUS 276; 142 MG/ML; MG/ML
15 INJECTION, SOLUTION INTRAVENOUS ONCE
Refills: 0 | Status: COMPLETED | OUTPATIENT
Start: 2024-01-01 | End: 2024-01-01

## 2024-01-01 RX ORDER — SODIUM BICARBONATE 84 MG/ML
1300 INJECTION, SOLUTION INTRAVENOUS
Refills: 0 | Status: DISCONTINUED | OUTPATIENT
Start: 2024-01-01 | End: 2024-01-01

## 2024-01-01 RX ORDER — CARVEDILOL 6.25 MG/1
1 TABLET ORAL
Refills: 0 | DISCHARGE

## 2024-01-01 RX ORDER — AMLODIPINE BESYLATE 10 MG/1
5 TABLET ORAL DAILY
Refills: 0 | Status: DISCONTINUED | OUTPATIENT
Start: 2024-01-01 | End: 2024-01-01

## 2024-01-01 RX ORDER — OXYCODONE HYDROCHLORIDE 5 MG/1
10 TABLET ORAL EVERY 4 HOURS
Refills: 0 | Status: DISCONTINUED | OUTPATIENT
Start: 2024-01-01 | End: 2024-01-01

## 2024-01-01 RX ORDER — FINASTERIDE 1 MG/1
5 TABLET, FILM COATED ORAL DAILY
Refills: 0 | Status: DISCONTINUED | OUTPATIENT
Start: 2024-01-01 | End: 2024-01-01

## 2024-01-01 RX ORDER — LEVOTHYROXINE SODIUM 100 MCG
50 TABLET ORAL DAILY
Refills: 0 | Status: DISCONTINUED | OUTPATIENT
Start: 2024-01-01 | End: 2024-01-01

## 2024-01-01 RX ORDER — FAMOTIDINE 10 MG/ML
20 INJECTION INTRAVENOUS ONCE
Refills: 0 | Status: COMPLETED | OUTPATIENT
Start: 2024-01-01 | End: 2024-01-01

## 2024-01-01 RX ORDER — ENOXAPARIN SODIUM 100 MG/ML
40 INJECTION SUBCUTANEOUS ONCE
Refills: 0 | Status: COMPLETED | OUTPATIENT
Start: 2024-01-01 | End: 2024-01-01

## 2024-01-01 RX ORDER — AMLODIPINE BESYLATE 10 MG/1
1 TABLET ORAL
Refills: 0 | DISCHARGE

## 2024-01-01 RX ORDER — PANTOPRAZOLE SODIUM 40 MG
40 TABLET, DELAYED RELEASE (ENTERIC COATED) ORAL
Refills: 0 | Status: DISCONTINUED | OUTPATIENT
Start: 2024-01-01 | End: 2024-01-01

## 2024-01-01 RX ORDER — TAMSULOSIN HYDROCHLORIDE 0.4 MG/1
1 CAPSULE ORAL
Refills: 0 | DISCHARGE

## 2024-01-01 RX ORDER — INSULIN REGULAR, HUMAN 100/ML (3)
5 INSULIN PEN (ML) SUBCUTANEOUS ONCE
Refills: 0 | Status: DISCONTINUED | OUTPATIENT
Start: 2024-01-01 | End: 2024-01-01

## 2024-01-01 RX ORDER — SODIUM CHLORIDE 3 G/100ML
1000 INJECTION, SOLUTION INTRAVENOUS
Refills: 0 | Status: DISCONTINUED | OUTPATIENT
Start: 2024-01-01 | End: 2024-01-01

## 2024-01-01 RX ORDER — ONDANSETRON 2 MG/ML
4 INJECTION, SOLUTION INTRAMUSCULAR; INTRAVENOUS EVERY 8 HOURS
Refills: 0 | Status: DISCONTINUED | OUTPATIENT
Start: 2024-01-01 | End: 2024-01-01

## 2024-01-01 RX ORDER — OXYCODONE HYDROCHLORIDE 5 MG/1
5 TABLET ORAL EVERY 4 HOURS
Refills: 0 | Status: DISCONTINUED | OUTPATIENT
Start: 2024-01-01 | End: 2024-01-01

## 2024-01-01 RX ORDER — FAMOTIDINE 10 MG/ML
20 INJECTION INTRAVENOUS ONCE
Refills: 0 | Status: DISCONTINUED | OUTPATIENT
Start: 2024-01-01 | End: 2024-01-01

## 2024-01-01 RX ORDER — SODIUM CHLORIDE 9 MG/ML
1000 INJECTION INTRAMUSCULAR; INTRAVENOUS; SUBCUTANEOUS ONCE
Refills: 0 | Status: DISCONTINUED | OUTPATIENT
Start: 2024-01-01 | End: 2024-01-01

## 2024-01-01 RX ORDER — SENNA 187 MG
2 TABLET ORAL AT BEDTIME
Refills: 0 | Status: DISCONTINUED | OUTPATIENT
Start: 2024-01-01 | End: 2024-01-01

## 2024-01-01 RX ORDER — ONDANSETRON 2 MG/ML
4 INJECTION, SOLUTION INTRAMUSCULAR; INTRAVENOUS ONCE
Refills: 0 | Status: COMPLETED | OUTPATIENT
Start: 2024-01-01 | End: 2024-01-01

## 2024-01-01 RX ORDER — PANTOPRAZOLE SODIUM 40 MG
40 TABLET, DELAYED RELEASE (ENTERIC COATED) ORAL EVERY 12 HOURS
Refills: 0 | Status: DISCONTINUED | OUTPATIENT
Start: 2024-01-01 | End: 2024-01-01

## 2024-01-01 RX ORDER — DEXTROSE 15 G/33 G
25 GEL IN PACKET (GRAM) ORAL ONCE
Refills: 0 | Status: COMPLETED | OUTPATIENT
Start: 2024-01-01 | End: 2024-01-01

## 2024-01-01 RX ORDER — SODIUM CHLORIDE 3 G/100ML
1000 INJECTION, SOLUTION INTRAVENOUS
Refills: 0 | Status: COMPLETED | OUTPATIENT
Start: 2024-01-01 | End: 2024-01-01

## 2024-01-01 RX ORDER — SODIUM CHLORIDE 9 MG/ML
1000 INJECTION INTRAMUSCULAR; INTRAVENOUS; SUBCUTANEOUS ONCE
Refills: 0 | Status: COMPLETED | OUTPATIENT
Start: 2024-01-01 | End: 2024-01-01

## 2024-01-01 RX ORDER — SODIUM CHLORIDE 9 MG/ML
500 INJECTION INTRAMUSCULAR; INTRAVENOUS; SUBCUTANEOUS
Refills: 0 | Status: DISCONTINUED | OUTPATIENT
Start: 2024-01-01 | End: 2024-01-01

## 2024-01-01 RX ORDER — HEPARIN SODIUM,BOVINE 1000/ML
5000 VIAL (ML) INJECTION EVERY 8 HOURS
Refills: 0 | Status: DISCONTINUED | OUTPATIENT
Start: 2024-01-01 | End: 2024-01-01

## 2024-01-01 RX ORDER — OXYCODONE HYDROCHLORIDE 5 MG/1
10 TABLET ORAL EVERY 6 HOURS
Refills: 0 | Status: DISCONTINUED | OUTPATIENT
Start: 2024-01-01 | End: 2024-01-01

## 2024-01-01 RX ORDER — DEXTROSE 15 G/33 G
50 GEL IN PACKET (GRAM) ORAL ONCE
Refills: 0 | Status: COMPLETED | OUTPATIENT
Start: 2024-01-01 | End: 2024-01-01

## 2024-01-01 RX ORDER — SODIUM PHOSPHATE, MONOBASIC, MONOHYDRATE AND SODIUM PHOSPHATE, DIBASIC ANHYDROUS 276; 142 MG/ML; MG/ML
30 INJECTION, SOLUTION INTRAVENOUS ONCE
Refills: 0 | Status: COMPLETED | OUTPATIENT
Start: 2024-01-01 | End: 2024-01-01

## 2024-01-01 RX ORDER — ENOXAPARIN SODIUM 100 MG/ML
40 INJECTION SUBCUTANEOUS EVERY 24 HOURS
Refills: 0 | Status: DISCONTINUED | OUTPATIENT
Start: 2024-01-01 | End: 2024-01-01

## 2024-01-01 RX ORDER — UREA 15 G
15 POWDER IN PACKET (EA) ORAL EVERY 12 HOURS
Refills: 0 | Status: DISCONTINUED | OUTPATIENT
Start: 2024-01-01 | End: 2024-01-01

## 2024-01-01 RX ORDER — SODIUM PHOSPHATE, DIBASIC, ANHYDROUS, POTASSIUM PHOSPHATE, MONOBASIC, AND SODIUM PHOSPHATE, MONOBASIC, MONOHYDRATE 852; 155; 130 MG/1; MG/1; MG/1
1 TABLET, COATED ORAL ONCE
Refills: 0 | Status: COMPLETED | OUTPATIENT
Start: 2024-01-01 | End: 2024-01-01

## 2024-01-01 RX ORDER — MAGNESIUM, ALUMINUM HYDROXIDE 200-225/5
30 SUSPENSION, ORAL (FINAL DOSE FORM) ORAL EVERY 4 HOURS
Refills: 0 | Status: DISCONTINUED | OUTPATIENT
Start: 2024-01-01 | End: 2024-01-01

## 2024-01-01 RX ORDER — ASPIRIN 81 MG
81 TABLET, DELAYED RELEASE (ENTERIC COATED) ORAL DAILY
Refills: 0 | Status: DISCONTINUED | OUTPATIENT
Start: 2024-01-01 | End: 2024-01-01

## 2024-01-01 RX ORDER — ACETAMINOPHEN 325 MG/1
650 TABLET ORAL EVERY 6 HOURS
Refills: 0 | Status: DISCONTINUED | OUTPATIENT
Start: 2024-01-01 | End: 2024-01-01

## 2024-01-01 RX ORDER — LORAZEPAM 4 MG/ML
0.5 INJECTION INTRAMUSCULAR; INTRAVENOUS EVERY 4 HOURS
Refills: 0 | Status: DISCONTINUED | OUTPATIENT
Start: 2024-01-01 | End: 2024-01-01

## 2024-01-01 RX ORDER — LIDOCAINE/BENZALKONIUM/ALCOHOL
1 SOLUTION, NON-ORAL TOPICAL EVERY 24 HOURS
Refills: 0 | Status: DISCONTINUED | OUTPATIENT
Start: 2024-01-01 | End: 2024-01-01

## 2024-01-01 RX ORDER — LEVOTHYROXINE SODIUM 100 MCG
1 TABLET ORAL
Refills: 0 | DISCHARGE

## 2024-01-01 RX ORDER — DEXTROSE 15 G/33 G
50 GEL IN PACKET (GRAM) ORAL ONCE
Refills: 0 | Status: DISCONTINUED | OUTPATIENT
Start: 2024-01-01 | End: 2024-01-01

## 2024-01-01 RX ADMIN — Medication 40 MILLIGRAM(S): at 17:31

## 2024-01-01 RX ADMIN — FINASTERIDE 5 MILLIGRAM(S): 1 TABLET, FILM COATED ORAL at 11:02

## 2024-01-01 RX ADMIN — Medication 50 MICROGRAM(S): at 06:03

## 2024-01-01 RX ADMIN — ONDANSETRON 4 MILLIGRAM(S): 2 INJECTION, SOLUTION INTRAMUSCULAR; INTRAVENOUS at 18:29

## 2024-01-01 RX ADMIN — CARVEDILOL 3.12 MILLIGRAM(S): 6.25 TABLET ORAL at 22:11

## 2024-01-01 RX ADMIN — Medication 40 MILLIGRAM(S): at 17:15

## 2024-01-01 RX ADMIN — SODIUM ZIRCONIUM CYCLOSILICATE 5 GRAM(S): 5 POWDER, FOR SUSPENSION ORAL at 18:47

## 2024-01-01 RX ADMIN — FINASTERIDE 5 MILLIGRAM(S): 1 TABLET, FILM COATED ORAL at 17:58

## 2024-01-01 RX ADMIN — Medication 80 MILLIGRAM(S): at 00:17

## 2024-01-01 RX ADMIN — ENOXAPARIN SODIUM 40 MILLIGRAM(S): 100 INJECTION SUBCUTANEOUS at 18:30

## 2024-01-01 RX ADMIN — Medication 50 MICROGRAM(S): at 06:18

## 2024-01-01 RX ADMIN — Medication 81 MILLIGRAM(S): at 11:35

## 2024-01-01 RX ADMIN — SODIUM PHOSPHATE, DIBASIC, ANHYDROUS, POTASSIUM PHOSPHATE, MONOBASIC, AND SODIUM PHOSPHATE, MONOBASIC, MONOHYDRATE 1 PACKET(S): 852; 155; 130 TABLET, COATED ORAL at 07:20

## 2024-01-01 RX ADMIN — Medication 25 MILLILITER(S): at 11:52

## 2024-01-01 RX ADMIN — Medication 5000 UNIT(S): at 22:17

## 2024-01-01 RX ADMIN — SODIUM CHLORIDE 50 MILLILITER(S): 9 INJECTION INTRAMUSCULAR; INTRAVENOUS; SUBCUTANEOUS at 12:58

## 2024-01-01 RX ADMIN — Medication 50 MICROGRAM(S): at 05:36

## 2024-01-01 RX ADMIN — Medication 40 MILLIGRAM(S): at 05:54

## 2024-01-01 RX ADMIN — SODIUM PHOSPHATE, MONOBASIC, MONOHYDRATE AND SODIUM PHOSPHATE, DIBASIC ANHYDROUS 63.75 MILLIMOLE(S): 276; 142 INJECTION, SOLUTION INTRAVENOUS at 13:45

## 2024-01-01 RX ADMIN — Medication 80 MILLIGRAM(S): at 05:23

## 2024-01-01 RX ADMIN — SODIUM CHLORIDE 35 MILLILITER(S): 3 INJECTION, SOLUTION INTRAVENOUS at 22:51

## 2024-01-01 RX ADMIN — FINASTERIDE 5 MILLIGRAM(S): 1 TABLET, FILM COATED ORAL at 03:51

## 2024-01-01 RX ADMIN — Medication 81 MILLIGRAM(S): at 13:17

## 2024-01-01 RX ADMIN — Medication 5000 UNIT(S): at 14:28

## 2024-01-01 RX ADMIN — TAMSULOSIN HYDROCHLORIDE 0.4 MILLIGRAM(S): 0.4 CAPSULE ORAL at 22:10

## 2024-01-01 RX ADMIN — Medication 50 MICROGRAM(S): at 05:11

## 2024-01-01 RX ADMIN — OXYCODONE HYDROCHLORIDE 5 MILLIGRAM(S): 5 TABLET ORAL at 20:24

## 2024-01-01 RX ADMIN — TAMSULOSIN HYDROCHLORIDE 0.4 MILLIGRAM(S): 0.4 CAPSULE ORAL at 23:10

## 2024-01-01 RX ADMIN — TAMSULOSIN HYDROCHLORIDE 0.4 MILLIGRAM(S): 0.4 CAPSULE ORAL at 22:56

## 2024-01-01 RX ADMIN — Medication 40 MILLIGRAM(S): at 17:38

## 2024-01-01 RX ADMIN — Medication 50 MICROGRAM(S): at 06:37

## 2024-01-01 RX ADMIN — SODIUM BICARBONATE 650 MILLIGRAM(S): 84 INJECTION, SOLUTION INTRAVENOUS at 18:27

## 2024-01-01 RX ADMIN — Medication 2 TABLET(S): at 07:17

## 2024-01-01 RX ADMIN — Medication 5000 UNIT(S): at 07:15

## 2024-01-01 RX ADMIN — Medication 40 MILLIGRAM(S): at 06:18

## 2024-01-01 RX ADMIN — FINASTERIDE 5 MILLIGRAM(S): 1 TABLET, FILM COATED ORAL at 11:32

## 2024-01-01 RX ADMIN — SODIUM CHLORIDE 50 MILLILITER(S): 3 INJECTION, SOLUTION INTRAVENOUS at 17:37

## 2024-01-01 RX ADMIN — Medication 3 MILLIGRAM(S): at 21:49

## 2024-01-01 RX ADMIN — Medication 40 MILLIGRAM(S): at 06:02

## 2024-01-01 RX ADMIN — Medication 2 TABLET(S): at 22:50

## 2024-01-01 RX ADMIN — SODIUM PHOSPHATE, MONOBASIC, MONOHYDRATE AND SODIUM PHOSPHATE, DIBASIC ANHYDROUS 85 MILLIMOLE(S): 276; 142 INJECTION, SOLUTION INTRAVENOUS at 14:56

## 2024-01-01 RX ADMIN — ACETAMINOPHEN 650 MILLIGRAM(S): 325 TABLET ORAL at 16:38

## 2024-01-01 RX ADMIN — Medication 5000 UNIT(S): at 13:02

## 2024-01-01 RX ADMIN — AMLODIPINE BESYLATE 5 MILLIGRAM(S): 10 TABLET ORAL at 05:28

## 2024-01-01 RX ADMIN — Medication 40 MILLIGRAM(S): at 06:19

## 2024-01-01 RX ADMIN — SODIUM CHLORIDE 60 MILLILITER(S): 3 INJECTION, SOLUTION INTRAVENOUS at 11:54

## 2024-01-01 RX ADMIN — Medication 30 MILLILITER(S): at 22:06

## 2024-01-01 RX ADMIN — OXYCODONE HYDROCHLORIDE 5 MILLIGRAM(S): 5 TABLET ORAL at 06:34

## 2024-01-01 RX ADMIN — Medication 5000 UNIT(S): at 06:38

## 2024-01-01 RX ADMIN — Medication 50 MICROGRAM(S): at 05:02

## 2024-01-01 RX ADMIN — Medication 40 MILLIGRAM(S): at 17:55

## 2024-01-01 RX ADMIN — Medication 81 MILLIGRAM(S): at 11:32

## 2024-01-01 RX ADMIN — Medication 50 MICROGRAM(S): at 06:19

## 2024-01-01 RX ADMIN — Medication 40 MILLIGRAM(S): at 18:27

## 2024-01-01 RX ADMIN — Medication 50 MICROGRAM(S): at 05:28

## 2024-01-01 RX ADMIN — SODIUM CHLORIDE 50 MILLILITER(S): 3 INJECTION, SOLUTION INTRAVENOUS at 09:57

## 2024-01-01 RX ADMIN — Medication 15 GRAM(S): at 17:56

## 2024-01-01 RX ADMIN — Medication 50 MICROGRAM(S): at 04:20

## 2024-01-01 RX ADMIN — Medication 50 MICROGRAM(S): at 07:41

## 2024-01-01 RX ADMIN — SODIUM CHLORIDE 500 MILLILITER(S): 9 INJECTION INTRAMUSCULAR; INTRAVENOUS; SUBCUTANEOUS at 05:14

## 2024-01-01 RX ADMIN — Medication 15 GRAM(S): at 05:19

## 2024-01-01 RX ADMIN — Medication 50 MILLILITER(S): at 08:36

## 2024-01-01 RX ADMIN — ONDANSETRON 4 MILLIGRAM(S): 2 INJECTION, SOLUTION INTRAMUSCULAR; INTRAVENOUS at 05:16

## 2024-01-01 RX ADMIN — Medication 5000 UNIT(S): at 22:10

## 2024-01-01 RX ADMIN — Medication 40 MILLIGRAM(S): at 07:42

## 2024-01-01 RX ADMIN — Medication 2 TABLET(S): at 21:48

## 2024-01-01 RX ADMIN — SODIUM BICARBONATE 650 MILLIGRAM(S): 84 INJECTION, SOLUTION INTRAVENOUS at 18:30

## 2024-01-01 RX ADMIN — FINASTERIDE 5 MILLIGRAM(S): 1 TABLET, FILM COATED ORAL at 12:01

## 2024-01-01 RX ADMIN — AMLODIPINE BESYLATE 5 MILLIGRAM(S): 10 TABLET ORAL at 05:36

## 2024-01-01 RX ADMIN — Medication 30 MILLILITER(S): at 05:54

## 2024-01-01 RX ADMIN — Medication 40 MILLIGRAM(S): at 17:20

## 2024-01-01 RX ADMIN — Medication 30 MILLILITER(S): at 08:46

## 2024-01-01 RX ADMIN — POLYETHYLENE GLYCOL 3350 17 GRAM(S): 17 POWDER, FOR SOLUTION ORAL at 12:27

## 2024-01-01 RX ADMIN — Medication 5000 UNIT(S): at 21:27

## 2024-01-01 RX ADMIN — AMLODIPINE BESYLATE 5 MILLIGRAM(S): 10 TABLET ORAL at 05:15

## 2024-01-01 RX ADMIN — SODIUM ZIRCONIUM CYCLOSILICATE 5 GRAM(S): 5 POWDER, FOR SUSPENSION ORAL at 22:50

## 2024-01-01 RX ADMIN — Medication 81 MILLIGRAM(S): at 11:15

## 2024-01-01 RX ADMIN — Medication 30 MILLILITER(S): at 12:00

## 2024-01-01 RX ADMIN — FINASTERIDE 5 MILLIGRAM(S): 1 TABLET, FILM COATED ORAL at 11:15

## 2024-01-01 RX ADMIN — Medication 40 MILLIGRAM(S): at 18:00

## 2024-01-01 RX ADMIN — TAMSULOSIN HYDROCHLORIDE 0.4 MILLIGRAM(S): 0.4 CAPSULE ORAL at 22:17

## 2024-01-01 RX ADMIN — SODIUM PHOSPHATE, DIBASIC, ANHYDROUS, POTASSIUM PHOSPHATE, MONOBASIC, AND SODIUM PHOSPHATE, MONOBASIC, MONOHYDRATE 1 PACKET(S): 852; 155; 130 TABLET, COATED ORAL at 13:18

## 2024-01-01 RX ADMIN — Medication 81 MILLIGRAM(S): at 11:52

## 2024-01-01 RX ADMIN — TAMSULOSIN HYDROCHLORIDE 0.4 MILLIGRAM(S): 0.4 CAPSULE ORAL at 22:11

## 2024-01-01 RX ADMIN — OXYCODONE HYDROCHLORIDE 10 MILLIGRAM(S): 5 TABLET ORAL at 16:30

## 2024-01-01 RX ADMIN — Medication 60 MILLILITER(S): at 18:00

## 2024-01-01 RX ADMIN — Medication 40 MILLIGRAM(S): at 07:15

## 2024-01-01 RX ADMIN — OXYCODONE HYDROCHLORIDE 10 MILLIGRAM(S): 5 TABLET ORAL at 00:17

## 2024-01-01 RX ADMIN — Medication 50 MICROGRAM(S): at 06:36

## 2024-01-01 RX ADMIN — OXYCODONE HYDROCHLORIDE 5 MILLIGRAM(S): 5 TABLET ORAL at 07:15

## 2024-01-01 RX ADMIN — TAMSULOSIN HYDROCHLORIDE 0.4 MILLIGRAM(S): 0.4 CAPSULE ORAL at 21:26

## 2024-01-01 RX ADMIN — ENOXAPARIN SODIUM 40 MILLIGRAM(S): 100 INJECTION SUBCUTANEOUS at 11:34

## 2024-01-01 RX ADMIN — ENOXAPARIN SODIUM 40 MILLIGRAM(S): 100 INJECTION SUBCUTANEOUS at 12:00

## 2024-01-01 RX ADMIN — ENOXAPARIN SODIUM 40 MILLIGRAM(S): 100 INJECTION SUBCUTANEOUS at 16:09

## 2024-01-01 RX ADMIN — Medication 30 MILLILITER(S): at 16:00

## 2024-01-01 RX ADMIN — FINASTERIDE 5 MILLIGRAM(S): 1 TABLET, FILM COATED ORAL at 12:38

## 2024-01-01 RX ADMIN — FINASTERIDE 5 MILLIGRAM(S): 1 TABLET, FILM COATED ORAL at 13:18

## 2024-01-01 RX ADMIN — Medication 80 MILLIGRAM(S): at 07:00

## 2024-01-01 RX ADMIN — AMLODIPINE BESYLATE 5 MILLIGRAM(S): 10 TABLET ORAL at 05:11

## 2024-01-01 RX ADMIN — Medication 40 MILLIGRAM(S): at 17:22

## 2024-01-01 RX ADMIN — ENOXAPARIN SODIUM 40 MILLIGRAM(S): 100 INJECTION SUBCUTANEOUS at 13:18

## 2024-01-01 RX ADMIN — Medication 81 MILLIGRAM(S): at 11:24

## 2024-01-01 RX ADMIN — SODIUM ZIRCONIUM CYCLOSILICATE 5 GRAM(S): 5 POWDER, FOR SUSPENSION ORAL at 08:36

## 2024-01-01 RX ADMIN — FAMOTIDINE 20 MILLIGRAM(S): 10 INJECTION INTRAVENOUS at 05:14

## 2024-01-01 RX ADMIN — Medication 50 MICROGRAM(S): at 07:15

## 2024-01-01 RX ADMIN — Medication 30 MILLILITER(S): at 17:24

## 2024-01-01 RX ADMIN — Medication 30 MILLILITER(S): at 05:28

## 2024-01-01 RX ADMIN — FINASTERIDE 5 MILLIGRAM(S): 1 TABLET, FILM COATED ORAL at 11:52

## 2024-01-01 RX ADMIN — Medication 40 MILLIGRAM(S): at 17:18

## 2024-01-01 RX ADMIN — SODIUM CHLORIDE 1000 MILLILITER(S): 9 INJECTION INTRAMUSCULAR; INTRAVENOUS; SUBCUTANEOUS at 18:25

## 2024-01-01 RX ADMIN — Medication 40 MILLIGRAM(S): at 17:40

## 2024-01-01 RX ADMIN — FAMOTIDINE 20 MILLIGRAM(S): 10 INJECTION INTRAVENOUS at 12:00

## 2024-01-01 RX ADMIN — Medication 81 MILLIGRAM(S): at 18:16

## 2024-01-01 RX ADMIN — Medication 50 MICROGRAM(S): at 06:02

## 2024-01-01 RX ADMIN — Medication 81 MILLIGRAM(S): at 12:50

## 2024-01-01 RX ADMIN — OXYCODONE HYDROCHLORIDE 10 MILLIGRAM(S): 5 TABLET ORAL at 17:44

## 2024-01-01 RX ADMIN — TAMSULOSIN HYDROCHLORIDE 0.4 MILLIGRAM(S): 0.4 CAPSULE ORAL at 21:37

## 2024-01-01 RX ADMIN — Medication 30 MILLILITER(S): at 13:06

## 2024-01-01 RX ADMIN — Medication 30 MILLILITER(S): at 06:17

## 2024-01-01 RX ADMIN — Medication 30 MILLILITER(S): at 21:32

## 2024-01-01 RX ADMIN — OXYCODONE HYDROCHLORIDE 10 MILLIGRAM(S): 5 TABLET ORAL at 01:17

## 2024-01-01 RX ADMIN — POLYETHYLENE GLYCOL 3350 17 GRAM(S): 17 POWDER, FOR SOLUTION ORAL at 17:16

## 2024-01-01 RX ADMIN — Medication 5000 UNIT(S): at 07:42

## 2024-01-01 RX ADMIN — TAMSULOSIN HYDROCHLORIDE 0.4 MILLIGRAM(S): 0.4 CAPSULE ORAL at 21:14

## 2024-01-01 RX ADMIN — Medication 40 MILLIGRAM(S): at 05:23

## 2024-01-01 RX ADMIN — Medication 5000 UNIT(S): at 14:03

## 2024-01-01 RX ADMIN — OXYCODONE HYDROCHLORIDE 5 MILLIGRAM(S): 5 TABLET ORAL at 20:47

## 2024-01-01 RX ADMIN — Medication 40 MILLIGRAM(S): at 04:20

## 2024-01-01 RX ADMIN — OXYCODONE HYDROCHLORIDE 5 MILLIGRAM(S): 5 TABLET ORAL at 20:00

## 2024-01-01 RX ADMIN — Medication 30 MILLILITER(S): at 02:51

## 2024-01-01 RX ADMIN — ENOXAPARIN SODIUM 40 MILLIGRAM(S): 100 INJECTION SUBCUTANEOUS at 14:58

## 2024-01-01 RX ADMIN — Medication 40 MILLIGRAM(S): at 18:31

## 2024-01-01 RX ADMIN — ONDANSETRON 4 MILLIGRAM(S): 2 INJECTION, SOLUTION INTRAMUSCULAR; INTRAVENOUS at 18:34

## 2024-01-01 RX ADMIN — Medication 80 MILLIGRAM(S): at 21:38

## 2024-01-01 RX ADMIN — ONDANSETRON 4 MILLIGRAM(S): 2 INJECTION, SOLUTION INTRAMUSCULAR; INTRAVENOUS at 07:00

## 2024-01-01 RX ADMIN — SODIUM CHLORIDE 1000 MILLILITER(S): 9 INJECTION INTRAMUSCULAR; INTRAVENOUS; SUBCUTANEOUS at 12:38

## 2024-01-01 RX ADMIN — FINASTERIDE 5 MILLIGRAM(S): 1 TABLET, FILM COATED ORAL at 11:35

## 2024-01-01 RX ADMIN — ENOXAPARIN SODIUM 40 MILLIGRAM(S): 100 INJECTION SUBCUTANEOUS at 16:25

## 2024-01-01 RX ADMIN — POLYETHYLENE GLYCOL 3350 17 GRAM(S): 17 POWDER, FOR SOLUTION ORAL at 13:21

## 2024-01-01 RX ADMIN — Medication 40 MILLIGRAM(S): at 17:44

## 2024-01-01 RX ADMIN — POLYETHYLENE GLYCOL 3350 17 GRAM(S): 17 POWDER, FOR SOLUTION ORAL at 16:09

## 2024-01-01 RX ADMIN — Medication 2 TABLET(S): at 22:17

## 2024-01-01 RX ADMIN — Medication 30 MILLILITER(S): at 22:10

## 2024-01-01 RX ADMIN — TAMSULOSIN HYDROCHLORIDE 0.4 MILLIGRAM(S): 0.4 CAPSULE ORAL at 22:50

## 2024-01-01 RX ADMIN — Medication 5000 UNIT(S): at 23:10

## 2024-01-01 RX ADMIN — Medication 5000 UNIT(S): at 13:21

## 2024-01-01 RX ADMIN — FINASTERIDE 5 MILLIGRAM(S): 1 TABLET, FILM COATED ORAL at 13:20

## 2024-01-01 RX ADMIN — Medication 30 MILLILITER(S): at 04:31

## 2024-01-01 RX ADMIN — Medication 50 MICROGRAM(S): at 06:13

## 2024-01-01 RX ADMIN — Medication 50 MICROGRAM(S): at 05:15

## 2024-01-01 RX ADMIN — Medication 100 MILLILITER(S): at 12:01

## 2024-01-01 RX ADMIN — TAMSULOSIN HYDROCHLORIDE 0.4 MILLIGRAM(S): 0.4 CAPSULE ORAL at 22:06

## 2024-01-01 RX ADMIN — ENOXAPARIN SODIUM 40 MILLIGRAM(S): 100 INJECTION SUBCUTANEOUS at 16:12

## 2024-01-01 RX ADMIN — Medication 60 MILLILITER(S): at 10:29

## 2024-01-01 RX ADMIN — Medication 5000 UNIT(S): at 06:37

## 2024-01-01 RX ADMIN — Medication 80 MILLIGRAM(S): at 22:07

## 2024-01-01 RX ADMIN — ENOXAPARIN SODIUM 40 MILLIGRAM(S): 100 INJECTION SUBCUTANEOUS at 11:32

## 2024-01-01 RX ADMIN — Medication 30 MILLILITER(S): at 22:22

## 2024-01-01 RX ADMIN — Medication 40 MILLIGRAM(S): at 06:17

## 2024-01-01 RX ADMIN — SODIUM CHLORIDE 60 MILLILITER(S): 3 INJECTION, SOLUTION INTRAVENOUS at 02:52

## 2024-01-01 RX ADMIN — Medication 50 MICROGRAM(S): at 06:24

## 2024-01-01 RX ADMIN — Medication 40 MILLIGRAM(S): at 06:38

## 2024-01-01 RX ADMIN — Medication 15 GRAM(S): at 06:25

## 2024-01-01 RX ADMIN — FINASTERIDE 5 MILLIGRAM(S): 1 TABLET, FILM COATED ORAL at 11:30

## 2024-01-01 RX ADMIN — Medication 40 MILLIGRAM(S): at 05:19

## 2024-01-01 RX ADMIN — OXYCODONE HYDROCHLORIDE 5 MILLIGRAM(S): 5 TABLET ORAL at 19:00

## 2024-01-01 RX ADMIN — Medication 40 MILLIGRAM(S): at 06:03

## 2024-01-01 RX ADMIN — FINASTERIDE 5 MILLIGRAM(S): 1 TABLET, FILM COATED ORAL at 12:27

## 2024-01-01 RX ADMIN — Medication 40 MILLIGRAM(S): at 18:48

## 2024-01-01 RX ADMIN — TAMSULOSIN HYDROCHLORIDE 0.4 MILLIGRAM(S): 0.4 CAPSULE ORAL at 21:31

## 2024-01-01 RX ADMIN — POLYETHYLENE GLYCOL 3350 17 GRAM(S): 17 POWDER, FOR SOLUTION ORAL at 11:52

## 2024-01-01 RX ADMIN — ACETAMINOPHEN 650 MILLIGRAM(S): 325 TABLET ORAL at 16:08

## 2024-01-01 RX ADMIN — Medication 40 MILLIGRAM(S): at 06:37

## 2024-01-01 RX ADMIN — ONDANSETRON 4 MILLIGRAM(S): 2 INJECTION, SOLUTION INTRAMUSCULAR; INTRAVENOUS at 22:13

## 2024-01-01 RX ADMIN — SODIUM CHLORIDE 35 MILLILITER(S): 3 INJECTION, SOLUTION INTRAVENOUS at 13:18

## 2024-01-01 RX ADMIN — TAMSULOSIN HYDROCHLORIDE 0.4 MILLIGRAM(S): 0.4 CAPSULE ORAL at 21:41

## 2024-01-01 RX ADMIN — Medication 5 UNIT(S): at 08:35

## 2024-01-01 RX ADMIN — FINASTERIDE 5 MILLIGRAM(S): 1 TABLET, FILM COATED ORAL at 11:12

## 2024-01-01 RX ADMIN — TAMSULOSIN HYDROCHLORIDE 0.4 MILLIGRAM(S): 0.4 CAPSULE ORAL at 22:09

## 2024-01-01 RX ADMIN — Medication 50 MILLILITER(S): at 22:23

## 2024-01-01 RX ADMIN — Medication 5000 UNIT(S): at 14:35

## 2024-01-01 RX ADMIN — Medication 15 GRAM(S): at 17:22

## 2024-01-01 RX ADMIN — Medication 2 TABLET(S): at 22:10

## 2024-01-01 RX ADMIN — Medication 30 MILLILITER(S): at 19:24

## 2024-01-01 RX ADMIN — Medication 30 MILLILITER(S): at 12:14

## 2024-01-01 RX ADMIN — POLYETHYLENE GLYCOL 3350 17 GRAM(S): 17 POWDER, FOR SOLUTION ORAL at 11:25

## 2024-01-01 RX ADMIN — Medication 50 MICROGRAM(S): at 05:55

## 2024-01-01 RX ADMIN — Medication 40 MILLIGRAM(S): at 06:24

## 2024-01-01 RX ADMIN — SODIUM CHLORIDE 500 MILLILITER(S): 9 INJECTION INTRAMUSCULAR; INTRAVENOUS; SUBCUTANEOUS at 05:18

## 2024-01-01 RX ADMIN — Medication 81 MILLIGRAM(S): at 13:21

## 2024-01-01 RX ADMIN — Medication 30 MILLILITER(S): at 21:14

## 2024-01-01 RX ADMIN — Medication 50 MICROGRAM(S): at 05:23

## 2024-01-01 RX ADMIN — FINASTERIDE 5 MILLIGRAM(S): 1 TABLET, FILM COATED ORAL at 17:15

## 2024-01-01 RX ADMIN — Medication 2 TABLET(S): at 21:31

## 2024-01-01 RX ADMIN — Medication 80 MILLIGRAM(S): at 16:35

## 2024-01-01 RX ADMIN — TAMSULOSIN HYDROCHLORIDE 0.4 MILLIGRAM(S): 0.4 CAPSULE ORAL at 21:49

## 2024-01-01 RX ADMIN — TAMSULOSIN HYDROCHLORIDE 0.4 MILLIGRAM(S): 0.4 CAPSULE ORAL at 21:27

## 2024-01-01 RX ADMIN — SODIUM BICARBONATE 650 MILLIGRAM(S): 84 INJECTION, SOLUTION INTRAVENOUS at 06:36

## 2024-01-01 RX ADMIN — Medication 30 MILLILITER(S): at 06:02

## 2024-01-01 RX ADMIN — TAMSULOSIN HYDROCHLORIDE 0.4 MILLIGRAM(S): 0.4 CAPSULE ORAL at 21:57

## 2024-01-01 RX ADMIN — TAMSULOSIN HYDROCHLORIDE 0.4 MILLIGRAM(S): 0.4 CAPSULE ORAL at 22:07

## 2024-01-01 RX ADMIN — Medication 40 MILLIGRAM(S): at 05:02

## 2024-01-01 RX ADMIN — FINASTERIDE 5 MILLIGRAM(S): 1 TABLET, FILM COATED ORAL at 11:24

## 2024-01-01 RX ADMIN — Medication 50 MICROGRAM(S): at 05:19

## 2024-01-01 RX ADMIN — POLYETHYLENE GLYCOL 3350 17 GRAM(S): 17 POWDER, FOR SOLUTION ORAL at 11:32

## 2024-01-01 RX ADMIN — ONDANSETRON 4 MILLIGRAM(S): 2 INJECTION, SOLUTION INTRAMUSCULAR; INTRAVENOUS at 08:06

## 2024-01-01 RX ADMIN — SODIUM BICARBONATE 650 MILLIGRAM(S): 84 INJECTION, SOLUTION INTRAVENOUS at 07:18

## 2024-01-01 RX ADMIN — Medication 30 MILLILITER(S): at 22:56

## 2024-01-01 RX ADMIN — Medication 2 TABLET(S): at 22:09

## 2024-01-01 RX ADMIN — Medication 30 MILLILITER(S): at 06:19

## 2024-01-01 RX ADMIN — ENOXAPARIN SODIUM 40 MILLIGRAM(S): 100 INJECTION SUBCUTANEOUS at 11:30

## 2024-01-01 RX ADMIN — Medication 30 MILLILITER(S): at 15:01

## 2024-08-16 NOTE — H&P ADULT - PROBLEM SELECTOR PLAN 2
Normal SpO2 on Room Air  CTA Chest Negative for PE    -TTE SOB on exertion.  Normal SpO2 on Room Air, No WOB, No SOB at rest.  CTA Chest Negative for PE    -TTE to evaluate LVEF

## 2024-08-16 NOTE — CONSULT NOTE ADULT - ATTENDING COMMENTS
73 M w/ Stage IV gastric cancer diagnosed in [facility] admitted to [facility] for further work up. He has evidence of innumerable liver mets on his CT scans and LFTs are elevated. Recommend MRCP to eval for any biliary obstruction as there is a large central mass/conglomerate masses. Will need liver bx to confirm metastatic disease and obtain pathology for confirmation. Depending on pt's clinical status and organ function, may consider starting in patient chemotherapy. d/w son at bedside.

## 2024-08-16 NOTE — H&P ADULT - NSHPREVIEWOFSYSTEMS_GEN_ALL_CORE
REVIEW OF SYSTEMS:    CONSTITUTIONAL: No weight loss  EYES/ENT: No visual changes;  No throat pain   NECK: No pain or stiffness  RESPIRATORY: No cough, wheezing, hemoptysis  CARDIOVASCULAR: No chest pain or palpitations  GENITOURINARY: No dysuria, frequency or hematuria  NEUROLOGICAL: No numbness or weakness  SKIN: No itching, rashes

## 2024-08-16 NOTE — ED PROVIDER NOTE - CARE PLAN
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

## 2024-08-16 NOTE — ED ADULT TRIAGE NOTE - HEIGHT IN CM
-Urine culture is pending.  That should be available in MyChart.    -Start antibiotic therapy with Omnicef.  Please take as directed for 7 days    -If not improving in the next 72 hours return for reassessment.   165.1

## 2024-08-16 NOTE — CONSULT NOTE ADULT - SUBJECTIVE AND OBJECTIVE BOX
Chief Complaint:  Patient is a 73y old  Male who presents with a chief complaint of     HPI:        Allergies:  No Known Allergies      Home Medications:    Hospital Medications:      PMHX/PSHX:  Hyperlipidemia    Stented coronary artery        Family history:      Denies family history of colon cancer/polyps, stomach cancer/polyps, pancreatic cancer/masses, liver cancer/disease, ovarian cancer and endometrial cancer.    Social History:   Tob: Denies  EtOH: Denies  Illicit Drugs: Denies    ROS:     General:  No wt loss, fevers, chills, night sweats, fatigue  Eyes:  Good vision, no reported pain  ENT:  No sore throat, pain, runny nose, dysphagia  CV:  No pain, palpitations, hypo/hypertension  Pulm:  No dyspnea, cough, tachypnea, wheezing  GI:  see HPI  :  No pain, bleeding, incontinence, nocturia  Muscle:  No pain, weakness  Neuro:  No weakness, tingling, memory problems  Psych:  No fatigue, insomnia, mood problems, depression  Endocrine:  No polyuria, polydipsia, cold/heat intolerance  Heme:  No petechiae, ecchymosis, easy bruisability  Skin:  No rash, tattoos, scars, edema    PHYSICAL EXAM:     GENERAL:  No acute distress  HEENT:  NCAT, no scleral icterus   CHEST:  no respiratory distress  HEART:  Regular rate and rhythm  ABDOMEN:  Soft, non-tender, non-distended, normoactive bowel sounds,  no masses, no hepato-splenomegaly, no signs of chronic liver disease  EXTREMITIES: No edema  SKIN:  No rash/erythema/ecchymoses/petechiae/wounds/abscess/warm/dry  NEURO:  Alert and oriented x 3, no asterixis    Vital Signs:  Vital Signs Last 24 Hrs  T(C): 36.8 (16 Aug 2024 10:56), Max: 36.9 (16 Aug 2024 03:18)  T(F): 98.2 (16 Aug 2024 10:56), Max: 98.4 (16 Aug 2024 03:18)  HR: 69 (16 Aug 2024 10:56) (67 - 80)  BP: 148/80 (16 Aug 2024 10:56) (145/84 - 149/80)  BP(mean): 100 (16 Aug 2024 05:52) (100 - 101)  RR: 16 (16 Aug 2024 10:56) (16 - 18)  SpO2: 96% (16 Aug 2024 10:56) (96% - 98%)    Parameters below as of 16 Aug 2024 10:56  Patient On (Oxygen Delivery Method): room air      Daily Height in cm: 165.1 (16 Aug 2024 02:30)    Daily     LABS:                        12.8   8.76  )-----------( 245      ( 16 Aug 2024 04:53 )             39.3     Mean Cell Volume: 90.3 fl (08-16-24 @ 04:53)    08-16    128<L>  |  92<L>  |  16  ----------------------------<  81  4.2   |  24  |  0.78    Ca    9.5      16 Aug 2024 07:29  Mg     2.1     08-16    TPro  7.9  /  Alb  3.4  /  TBili  1.5<H>  /  DBili  x   /  AST  158<H>  /  ALT  71<H>  /  AlkPhos  978<H>  08-16    LIVER FUNCTIONS - ( 16 Aug 2024 04:53 )  Alb: 3.4 g/dL / Pro: 7.9 g/dL / ALK PHOS: 978 U/L / ALT: 71 U/L / AST: 158 U/L / GGT: x             Urinalysis Basic - ( 16 Aug 2024 07:29 )    Color: x / Appearance: x / SG: x / pH: x  Gluc: 81 mg/dL / Ketone: x  / Bili: x / Urobili: x   Blood: x / Protein: x / Nitrite: x   Leuk Esterase: x / RBC: x / WBC x   Sq Epi: x / Non Sq Epi: x / Bacteria: x      Amylase Serum--      Lipase serum79       Ammonia--                          12.8   8.76  )-----------( 245      ( 16 Aug 2024 04:53 )             39.3       Imaging:           Chief Complaint:  Patient is a 73y old  Male who presents with a chief complaint of     HPI: 73 year old male, PMH HTN, HLD, CAD w/ stents, presenting with SOB, recent diagnosis of stomach adenocarcinoma. Patient recently visiting family in [facility], had full workup performed there, including egd with pathology showing adenocarcinoma and imaging showing mets to the lungs and liver. Patient complains of shortness of breath for the past week worse with exertion and for the past 2 days complaining of nausea and epigastric pain with the eating.  No vomiting.  Denies fevers, chills, chest pain, diarrhea.    In the ED, Hgb 12.8, Bili 1.5, Alk Phos 978, , ALT 71, lactate 5.4. CT/ CTA negative for PE or pulmonary mets, shows many hepatics mets and gastrohepatic lymphadenopathy.       Allergies:  No Known Allergies      Home Medications:    Hospital Medications:      PMHX/PSHX:  Hyperlipidemia    Stented coronary artery        Family history:      Denies family history of colon cancer/polyps, stomach cancer/polyps, pancreatic cancer/masses, liver cancer/disease, ovarian cancer and endometrial cancer.    Social History:   Tob: Denies  EtOH: Denies  Illicit Drugs: Denies    ROS:     General:  No wt loss, fevers, chills, night sweats, fatigue  Eyes:  Good vision, no reported pain  ENT:  No sore throat, pain, runny nose, dysphagia  CV:  No pain, palpitations, hypo/hypertension  Pulm:  No dyspnea, cough, tachypnea, wheezing  GI:  see HPI  :  No pain, bleeding, incontinence, nocturia  Muscle:  No pain, weakness  Neuro:  No weakness, tingling, memory problems  Psych:  No fatigue, insomnia, mood problems, depression  Endocrine:  No polyuria, polydipsia, cold/heat intolerance  Heme:  No petechiae, ecchymosis, easy bruisability  Skin:  No rash, tattoos, scars, edema    PHYSICAL EXAM:     GENERAL:  No acute distress  HEENT:  NCAT, no scleral icterus   CHEST:  no respiratory distress  HEART:  Regular rate and rhythm  ABDOMEN:  Soft, non-tender, non-distended, normoactive bowel sounds,  no masses, no hepato-splenomegaly, no signs of chronic liver disease  EXTREMITIES: No edema  SKIN:  No rash/erythema/ecchymoses/petechiae/wounds/abscess/warm/dry  NEURO:  Alert and oriented x 3, no asterixis    Vital Signs:  Vital Signs Last 24 Hrs  T(C): 36.8 (16 Aug 2024 10:56), Max: 36.9 (16 Aug 2024 03:18)  T(F): 98.2 (16 Aug 2024 10:56), Max: 98.4 (16 Aug 2024 03:18)  HR: 69 (16 Aug 2024 10:56) (67 - 80)  BP: 148/80 (16 Aug 2024 10:56) (145/84 - 149/80)  BP(mean): 100 (16 Aug 2024 05:52) (100 - 101)  RR: 16 (16 Aug 2024 10:56) (16 - 18)  SpO2: 96% (16 Aug 2024 10:56) (96% - 98%)    Parameters below as of 16 Aug 2024 10:56  Patient On (Oxygen Delivery Method): room air      Daily Height in cm: 165.1 (16 Aug 2024 02:30)    Daily     LABS:                        12.8   8.76  )-----------( 245      ( 16 Aug 2024 04:53 )             39.3     Mean Cell Volume: 90.3 fl (08-16-24 @ 04:53)    08-16    128<L>  |  92<L>  |  16  ----------------------------<  81  4.2   |  24  |  0.78    Ca    9.5      16 Aug 2024 07:29  Mg     2.1     08-16    TPro  7.9  /  Alb  3.4  /  TBili  1.5<H>  /  DBili  x   /  AST  158<H>  /  ALT  71<H>  /  AlkPhos  978<H>  08-16    LIVER FUNCTIONS - ( 16 Aug 2024 04:53 )  Alb: 3.4 g/dL / Pro: 7.9 g/dL / ALK PHOS: 978 U/L / ALT: 71 U/L / AST: 158 U/L / GGT: x             Urinalysis Basic - ( 16 Aug 2024 07:29 )    Color: x / Appearance: x / SG: x / pH: x  Gluc: 81 mg/dL / Ketone: x  / Bili: x / Urobili: x   Blood: x / Protein: x / Nitrite: x   Leuk Esterase: x / RBC: x / WBC x   Sq Epi: x / Non Sq Epi: x / Bacteria: x      Amylase Serum--      Lipase serum79       Ammonia--                          12.8   8.76  )-----------( 245      ( 16 Aug 2024 04:53 )             39.3       Imaging:    < from: CT Abdomen and Pelvis w/ IV Cont (08.16.24 @ 06:39) >  IMPRESSION:  Mass within the wall of the lesser curvature of the gastric body   representing the patient's gastric malignancy described in the history.    A large number of metastases replace the majority of the hepatic   parenchyma with resulting mild hepatomegaly.    Likely metastatic gastrohepatic lymphadenopathy.    Small amount of ascites.    No pulmonary embolus or acute finding in the chest.    Mild cardiomegaly with dilated left ventricle, coronary artery   atherosclerosis and stents.    No pulmonary metastases are visible on this study.    < end of copied text >

## 2024-08-16 NOTE — ED PROVIDER NOTE - CLINICAL SUMMARY MEDICAL DECISION MAKING FREE TEXT BOX
74-year-old male, history of hypertension, hyperlipidemia, CAD with stents, recent diagnosis of stomach cancer with mets to liver and lung in [facility].  [place] and was visiting [facility] when workup was done.  Was recommended to start treatment however son prefer to return home to undergo treatment.  Patient complains of shortness of breath for the past week worse with exertion and for the past 2 days complaining of nausea and epigastric pain with the eating.  No vomiting.  Denies fevers, chills, chest pain, diarrhea.  Has not had any workup in the [place] for this new diagnosis.  VSS.  Patient chronically ill appearing, in no acute distress, heart RRR, lungs clear, abd soft with epigastric tenderness, no gross motor/sensory deficits, 1+ pitting edema BL LE 74-year-old male, history of hypertension, hyperlipidemia, CAD with stents, recent diagnosis of stomach cancer with mets to liver and lung in [facility].  [place] the [place] and was visiting [facility] when workup was done.  Was recommended to start treatment however son prefer to return home to undergo treatment.  Patient complains of shortness of breath for the past week worse with exertion and for the past 2 days complaining of nausea and epigastric pain with the eating.  No vomiting.  Denies fevers, chills, chest pain, diarrhea.  Has not had any work[place] in [place] for [place] new diagnosis.  VSS.  Patient chronically ill appearing, in no acute distress, heart RRR, lungs clear, abd soft with epigastric tenderness, no gross motor/sensory deficits, 1+ pitting edema BL LE.  Will assess for infectious/metabolic process, ACS, arrhythmia.  Symptoms like 2/2 to cancer dx.  Will obtain labs, CT chest, abdomen/pelvis, ecg.  Patient will need admission for further evaluation.

## 2024-08-16 NOTE — ED PROVIDER NOTE - ATTENDING CONTRIBUTION TO CARE
This is a 73-year-old gentleman recently seen in [facility] for metastatic cancer.  Findings are suggestive of adenocarcinoma of the stomach with hepatic and lymph node metastasis.  He came back to the [place] as he lives here and he wanted to get treatment here and not treatment and [facility].  Has epigastric abdominal pain shortness of breath and fatigue  Regular rate and rhythm clear lungs epigastric tenderness to palpation with no rebound or guarding  Will do CT scan abdomen pelvis.  Please see my review of the documentation as above.  Notably the patient on August 7 had a large ulcer with raised margin with active hemorrhage in the proximal body of the stomach on the endoscopy performed on that day.  I think he may patient may have also had hepatitis C though I am having difficulty in understanding the results from [place] other [place].  Will send acute hepatitis panel.  CBC CMP chest x-ray EKG urinalysis and likely admit to the hospital.  The patient is frail and ill-appearing.

## 2024-08-16 NOTE — H&P ADULT - PROBLEM SELECTOR PLAN 4
127, 128 on admission.  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

## 2024-08-16 NOTE — H&P ADULT - NSICDXPASTMEDICALHX_GEN_ALL_CORE_FT
PAST MEDICAL HISTORY:  CAD (coronary artery disease)     History of BPH     Hyperlipidemia     Hypertension     Hypothyroid     Stented coronary artery

## 2024-08-16 NOTE — H&P ADULT - HISTORY OF PRESENT ILLNESS
74 y/o M, PMH CAD w/5 stents, HTN, HLD, recent Dx of gastric adenocarcinoma, Sets[place] speaking, presents with burning epigastric pain associated with eating x 2 days, shortness of breath on exertion x 2 weeks. He was in his usual state of health until 2 weeks ago when he developed diffuse burning abdominal discomfort and constipation while in [facility]. He noticed shortness of breath when walking fast. He reports episodes of black stools on 8/1 and 8/4. He saw a physician there who found occult blood in his stool. He was referred for endoscopy on 8/12, which showed a bleeding gastric ulcer, biopsy was taken and hemostasis achieved. Biopsy report reveals gastric adenocarcinoma. Further imaging for staging revealed metastasis to liver and adjacent lymph nodes. He had one episode of black stools the day after endoscopy and normal stools since then. He elected to pursue further care here in the [place], and deferred any treatment in [facility].     He reports two episodes of nausea last week, states he had an episode of chills and diffuse burning sensation in his back after receiving IV contrast during his CT scan in [facility]. Son states he received 4 "bags" of blood products for low hemoglobin in [facility]. He currently denies any symptoms at rest, denies any fever, significant weight loss, chest pain, shortness of breath, vomiting, dysuria, back pain, or diarrhea. His last bowel movement was yesterday, yellow and brown in color.     He grew up in [facility], worked as , was a former smoker for 20 years and quit in 1986.   [place] cardiac st[place] the [place], [place] x2, 2013 x1, 2014 x1, 2023x1.    In the ED, Hgb 12.8, Bili 1.5, Alk Phos 978, , ALT 71, lactate 5.4. CT/ CTA negative for PE or pulmonary mets, shows many hepatics mets and gastrohepatic lymphadenopathy.    72 y/o M, PMH CAD w/5 stents, HTN, HLD, recent Dx of gastric adenocarcinoma, Italian speaking, presents with burning epigastric pain associated with eating x 2 days, shortness of breath on exertion x 2 weeks. He was in his usual state of health until 2 weeks ago when he developed diffuse burning abdominal discomfort and constipation while in [facility]. He noticed shortness of breath when walking fast. He reports episodes of black stools on 8/1 and 8/4. He saw a physician there who found occult blood in his stool. He was referred for endoscopy on 8/12, which showed a bleeding gastric ulcer, biopsy was taken and hemostasis achieved. Biopsy report reveals gastric adenocarcinoma. Further imaging for staging revealed metastasis to liver and adjacent lymph nodes. He had one episode of black stools the day after endoscopy and normal stools since then. He elected to pursue further care here in the [place], and deferred any treatment in [facility].     He reports two episodes of nausea last week, states he had an episode of chills and diffuse burning sensation in his back after receiving IV contrast during his CT scan in [facility]. Son states he received 4 "bags" of blood products for low hemoglobin in [facility]. He currently denies any symptoms at rest, denies any fever, significant weight loss, chest pain, vomiting, dysuria, back pain, or diarrhea. His last bowel movement was yesterday, yellow and brown in color.     He grew up in [facility], worked as , was a former smoker for 20 years and quit in 1986.   His cardiac stents were [place] the [place], 2011 x2, 2013 x1, 2014 x1, 2023x1.    In the ED, Hgb 12.8, Bili 1.5, Alk Phos 978, , ALT 71, lactate 5.4. CT/ CTA negative for PE or pulmonary mets, shows many hepatics mets and gastrohepatic lymphadenopathy.

## 2024-08-16 NOTE — ED PROVIDER NOTE - PROGRESS NOTE DETAILS
I asked the nurse not to administer the fluid even there was about to be started out of concern for hyponatremia.

## 2024-08-16 NOTE — CONSULT NOTE ADULT - SUBJECTIVE AND OBJECTIVE BOX
Interventional Radiology    Evaluate for Procedure: Liver lesion bx    HPI: 73 year old male, PMH HTN, HLD, CAD w/ stents, presenting with SOB, recent diagnosis of stomach adenocarcinoma. Patient recently visiting family in [facility], had full workup performed there, including egd with pathology showing adenocarcinoma and imaging showing mets to the lungs and liver. Patient complains of shortness of breath for the past week worse with exertion and for the past 2 days complaining of nausea and epigastric pain with the eating.  No vomiting.  Denies fevers, chills, chest pain, diarrhea.      IR consulted for liver lesion bx.    Allergies: No Known Allergies    Medications (Abx/Cardiac/Anticoagulation/Blood Products)    Data:  165.1  67.4  T(C): 36.8  HR: 76  BP: 134/81  RR: 17  SpO2: 94%    -WBC 8.76 / HgB 12.8 / Hct 39.3 / Plt 245  -Na 128 / Cl 92 / BUN 16 / Glucose 81  -K 4.2 / CO2 24 / Cr 0.78  -ALT -- / Alk Phos -- / T.Bili --    Radiology: Reviewed.    Assessment/Plan: 73y Male with HTN, HLD, CAD w/ stents, presenting with SOB, recent diagnosis of stomach adenocarcinoma now found to have liver lesions. IR consulted for bx.     - case reviewed and approved for 8/20/24  - please place IR procedure order under Dr. Phillip Zhao  - STAT labs in AM (cbc,coags, bmp, T&S)  - pt may be on ASA for CAD stents, please hold unless strictly contraindicated. Please inform IR if patient is on any other anticoagulant or antiplatelet.  - NPO on 8/19/24 at 11pm  - d/w primary team

## 2024-08-16 NOTE — ED ADULT TRIAGE NOTE - CHIEF COMPLAINT QUOTE
Recent dx w/ stomach cancer w/ mets to lungs and liver. Son reports pt to be anemic and needs blood transfusion having SOB

## 2024-08-16 NOTE — ED ADULT NURSE NOTE - NSFALLRISKINTERV_ED_ALL_ED
Assistance OOB with selected safe patient handling equipment if applicable/Assistance with ambulation/Communicate fall risk and risk factors to all staff, patient, and family/Monitor gait and stability/Provide visual cue: yellow wristband, yellow gown, etc/Reinforce activity limits and safety measures with patient and family/Call bell, personal items and telephone in reach/Instruct patient to call for assistance before getting out of bed/chair/stretcher/Non-slip footwear applied when patient is off stretcher/[place] to call system/Physically safe environment - no spills, clutter or unnecessary equipment/Purposeful Proactive Rounding/Room/bathroom lighting operational, light cord in reach

## 2024-08-16 NOTE — ED ADULT NURSE REASSESSMENT NOTE - NS ED NURSE REASSESS COMMENT FT1
Report received from Esther PHILIP. Patient repositioned and resting in bed. Patients son at bedside.

## 2024-08-16 NOTE — CONSULT NOTE ADULT - SUBJECTIVE AND OBJECTIVE BOX
Oncology Consult Note    HPI: 73 year old male, PMH HTN, HLD, CAD w/ stents, presenting with SOB, recent diagnosis of stomach adenocarcinoma. Patient recently visiting family in [facility], had full workup performed there, including egd with pathology showing adenocarcinoma and imaging showing mets to the lungs and liver. Patient complains of shortness of breath for the past week worse with exertion and for the past 2 days complaining of nausea and epigastric pain with the eating.  No vomiting.  Denies fevers, chills, chest pain, diarrhea.    In the ED, Hgb 12.8, Bili 1.5, Alk Phos 978, , ALT 71, lactate 5.4. CT/ CTA negative for PE or pulmonary mets, shows many hepatics mets and gastrohepatic lymphadenopathy.       REVIEW OF SYSTEMS:    CONSTITUTIONAL: No weakness, fevers or chills  EYES/ENT: No visual changes;  No vertigo or throat pain   NECK: No pain or stiffness  RESPIRATORY: + SOB  CARDIOVASCULAR: No chest pain or palpitations  GASTROINTESTINAL: + nausea and epigastric pain  GENITOURINARY: No dysuria, frequency or hematuria  NEUROLOGICAL: No numbness or weakness  SKIN: No itching, burning, rashes, or lesions   All other review of systems is negative unless indicated above.    PAST MEDICAL & SURGICAL HISTORY:  Hyperlipidemia      Stented coronary artery        SOCIAL HISTORY: Denies tobacco, etoh or illicit drug use     Allergies    No Known Allergies    Intolerances        MEDICATIONS  (STANDING):    MEDICATIONS  (PRN):      OBJECTIVE   Height (cm): 165.1 (08-16 @ 02:30)  Weight (kg): 67.4 (08-16 @ 03:18)  BMI (kg/m2): 24.7 (08-16 @ 03:18)  BSA (m2): 1.74 (08-16 @ 03:18)    T(F): 98.9 (08-16-24 @ 12:51), Max: 98.9 (08-16-24 @ 12:51)  HR: 72 (08-16-24 @ 12:51)  BP: 127/77 (08-16-24 @ 12:51)  RR: 17 (08-16-24 @ 12:51)  SpO2: 95% (08-16-24 @ 12:51)  Wt(kg): --    PHYSICAL EXAM   GENERAL: NAD, well-developed  HEAD:  Atraumatic, Normocephalic  EYES: EOMI, PERRLA, conjunctiva and sclera clear  NECK: Supple, No JVD  CHEST/LUNG: Clear to auscultation bilaterally; No wheeze  HEART: Regular rate and rhythm; No murmurs, rubs, or gallops  ABDOMEN: Soft, Nontender, Nondistended; Bowel sounds present  EXTREMITIES:  2+ Peripheral Pulses, No clubbing, cyanosis, or edema  NEUROLOGY: non-focal  SKIN: No rashes or lesions    LABS:                      12.8   8.76  )-----------( 245      ( 16 Aug 2024 04:53 )             39.3       08-16    128<L>  |  92<L>  |  16  ----------------------------<  81  4.2   |  24  |  0.78    Ca    9.5      16 Aug 2024 07:29  Mg     2.1     08-16    TPro  7.9  /  Alb  3.4  /  TBili  1.5<H>  /  DBili  x   /  AST  158<H>  /  ALT  71<H>  /  AlkPhos  978<H>  08-16      Magnesium: 2.1 mg/dL (08-16 @ 04:53)    RADIOLOGY:     < from: CT Angio Chest PE Protocol w/ IV Cont (08.16.24 @ 06:38) >  IMPRESSION:  Mass within the wall of the lesser curvature of the gastric body   representing the patient's gastric malignancy described in the history.    A large number of metastases replace the majority of the hepatic   parenchyma with resulting mild hepatomegaly.    Likely metastatic gastrohepatic lymphadenopathy.    Small amount of ascites.    No pulmonary embolus or acute finding in the chest.    Mild cardiomegaly with dilated left ventricle, coronary artery   atherosclerosis and stents.    No pulmonary metastases are visible on this study.    < end of copied text >   Oncology Consult Note    HPI: 73 year old male, PMH HTN, HLD, CAD w/ stents, presenting with SOB, recent diagnosis of stomach adenocarcinoma. Patient recently visiting family in [facility], had full workup performed there, including egd with pathology showing adenocarcinoma and imaging showing mets to the lungs and liver. Patient complains of shortness of breath for the past week worse with exertion and for the past 2 days complaining of nausea and epigastric pain with the eating.  No vomiting.  Denies fevers, chills, chest pain, diarrhea.    In the ED, Hgb 12.8, Bili 1.5, Alk Phos 978, , ALT 71, lactate 5.4. CT/ CTA negative for PE or pulmonary mets, shows many hepatics mets and gastrohepatic lymphadenopathy.       REVIEW OF SYSTEMS:    CONSTITUTIONAL: No weakness, fevers or chills  EYES/ENT: No visual changes;  No vertigo or throat pain   NECK: No pain or stiffness  RESPIRATORY: + SOB  CARDIOVASCULAR: No chest pain or palpitations  GASTROINTESTINAL: + nausea and epigastric pain  GENITOURINARY: No dysuria, frequency or hematuria  NEUROLOGICAL: No numbness or weakness  SKIN: No itching, burning, rashes, or lesions   All other review of systems is negative unless indicated above.    PAST MEDICAL & SURGICAL HISTORY:  Hyperlipidemia      Stented coronary artery        SOCIAL HISTORY: Denies tobacco, etoh or illicit drug use     Allergies    No Known Allergies    Intolerances        MEDICATIONS  (STANDING):    MEDICATIONS  (PRN):      OBJECTIVE   Height (cm): 165.1 (08-16 @ 02:30)  Weight (kg): 67.4 (08-16 @ 03:18)  BMI (kg/m2): 24.7 (08-16 @ 03:18)  BSA (m2): 1.74 (08-16 @ 03:18)    T(F): 98.9 (08-16-24 @ 12:51), Max: 98.9 (08-16-24 @ 12:51)  HR: 72 (08-16-24 @ 12:51)  BP: 127/77 (08-16-24 @ 12:51)  RR: 17 (08-16-24 @ 12:51)  SpO2: 95% (08-16-24 @ 12:51)  Wt(kg): --    PHYSICAL EXAM   GENERAL: pale, sleeping i nbed   HEAD:  Atraumatic, Normocephalic  EYES: EOMI, conjunctiva and sclera clear  CHEST/LUNG: Clear to auscultation bilaterally  HEART: Regular rate and rhythm  ABDOMEN: Soft, Nontender, Nondistended  EXTREMITIES: No clubbing, cyanosis, or edema  NEUROLOGY: non-focal  SKIN: No rashes or lesions    LABS:                      12.8   8.76  )-----------( 245      ( 16 Aug 2024 04:53 )             39.3       08-16    128<L>  |  92<L>  |  16  ----------------------------<  81  4.2   |  24  |  0.78    Ca    9.5      16 Aug 2024 07:29  Mg     2.1     08-16    TPro  7.9  /  Alb  3.4  /  TBili  1.5<H>  /  DBili  x   /  AST  158<H>  /  ALT  71<H>  /  AlkPhos  978<H>  08-16      Magnesium: 2.1 mg/dL (08-16 @ 04:53)    RADIOLOGY:     < from: CT Angio Chest PE Protocol w/ IV Cont (08.16.24 @ 06:38) >  IMPRESSION:  Mass within the wall of the lesser curvature of the gastric body   representing the patient's gastric malignancy described in the history.    A large number of metastases replace the majority of the hepatic   parenchyma with resulting mild hepatomegaly.    Likely metastatic gastrohepatic lymphadenopathy.    Small amount of ascites.    No pulmonary embolus or acute finding in the chest.    Mild cardiomegaly with dilated left ventricle, coronary artery   atherosclerosis and stents.    No pulmonary metastases are visible on this study.    < end of copied text >

## 2024-08-16 NOTE — H&P ADULT - PROBLEM SELECTOR PLAN 1
Biopsy proven in [facility] from endoscopy on 8/12/24  -Onc following, recs given  ------will require repeat diagnostic biopsy (likely IR guided biopsy of liver lesion or hepatic adenopathy); will require HER2, programmed death ligand 1 (PD-L1),and microsatellite testing/NGS.  ------HIV, hepatitis panel, H-pylori testing   ------Pending MR pelvis  ------pending IR guided liver bx scheduled for 8/20  -GI following, recs given  -------RUQ US with doppler for elevated liver enzymes  -------Hepatitis panel  -------benefits of ASA in setting of CAD w/5 stents outweigh increased risk of GI bleeding Biopsy proven in [facility] from endoscopy on 8/12/24  -Onc following, recs given  ------will require repeat diagnostic biopsy (likely IR guided biopsy of liver lesion or hepatic adenopathy); will require HER2, programmed death ligand 1 (PD-L1),and microsatellite testing/NGS.  ------HIV, hepatitis panel, H-pylori testing, f/u in AM   ------Pending MR pelvis  ------pending IR guided liver bx scheduled for 8/20  -GI following, recs given  -------RUQ US with doppler for elevated liver enzymes  -------Hepatitis panel  -------benefits of ASA in setting of CAD w/5 stents outweigh increased risk of GI bleeding

## 2024-08-16 NOTE — ED ADULT TRIAGE NOTE - AS TEMP SITE
Goal Outcome Evaluation:  Plan of Care Reviewed With: patient        Progress: improving  Outcome Summary: PT eval is completed. patient presents psot right nephrectomy. patient demonstrates impaired bed mobility transfers and gait as well as dynamic standing balance deficits. patient was able to transfer with min assist and ambulate 120 ft with min assist. anticipate patient would benefit from short IP rehab stay at [facility] as patient lives alone   oral

## 2024-08-16 NOTE — H&P ADULT - PROBLEM SELECTOR PLAN 9
DVT ppx: Lovenox: preferred in malignancy  Diet: Salt and cholesterol restricted, Halal  Code: Full Code

## 2024-08-16 NOTE — CONSULT NOTE ADULT - ATTENDING COMMENTS
Agree with above.  Patient was recently diagnosed with adenocarcinoma of the stomach with metastasis to the liver.  Obtain oncology consultation, and obtain IR guided liver biopsies of the hepatic metastatic lesions.  If additional biopsies of the gastric cancer is needed if it will change oncologic treatment, we will offer repeat endoscopy.

## 2024-08-16 NOTE — H&P ADULT - NSHPPHYSICALEXAM_GEN_ALL_CORE
T(C): 37.3 (08-16-24 @ 17:26), Max: 37.3 (08-16-24 @ 17:26)  HR: 73 (08-16-24 @ 17:26) (67 - 90)  BP: 130/69 (08-16-24 @ 17:26) (118/68 - 149/80)  RR: 18 (08-16-24 @ 17:26) (16 - 18)  SpO2: 95% (08-16-24 @ 17:26) (94% - 98%)    GENERAL: patient appears well, no acute distress, appropriate, pleasant  EYES: sclera clear, no exudates  ENMT: oropharynx clear without erythema, no exudates, moist mucous membranes  NECK: Elevated JVP, supple, no thyromegaly noted. No cervical lymphadenopathy.  LUNGS: good air entry bilaterally, clear to auscultation, symmetric breath sounds, no wheezing or rhonchi appreciated  HEART: S1/S2, regular rate, irregularly irregular rhythm, no murmurs noted, no lower extremity edema  BACK: No midline bony tenderness. No ecchymosis over flanks. No CVA tenderness.  GASTROINTESTINAL: Bowel sounds present, soft, nondistended, +TTP over epigastric region and RUQ, negative obturator sign. No tenderness at RLQ. No rebound tenderness.  INTEGUMENT: good skin turgor, no lesions noted  MUSCULOSKELETAL: no clubbing or cyanosis, no obvious deformity  NEUROLOGIC: awake, alert, oriented x3, good muscle tone in 4 extremities, no obvious sensory deficits

## 2024-08-16 NOTE — ED ADULT NURSE NOTE - HOW OFTEN DO YOU HAVE A DRINK CONTAINING ALCOHOL?
CHIEF COMPLAINT:  Chief Complaint   Patient presents with   • Office Visit     Right hip pain     HISTORY OF PRESENT ILLNESS:  Arnel Martinez is a 65 year old male who presents to the clinic today for a follow up appointment regarding their right hip pain. The pain started in spring 2022 with no reported mechanism of injury however patient did note that he did begin taking Crestor just prior to the onset of pain and is unsure if it is related. Patient was last seen on 5/3/22 and was recommended the following plan of care: Right hip intra-articular injection, provided with stretching and strengthening exercises for the bilateral lower extremities and core, continue to use ibuprofen and topical diclofenac as needed for pain management. Patient declined any further medications at that time. Status post right hip intra-articular injection under ultrasound guidance on 5/3/22 with 85% relief that is ongoing. Patient reports that pain is located in the right anterior hip, described as achy sensations and pain is rated 1-2/10. Aggravating factors include putting on shoes and pants, lying on the right side and going up the stairs and alleviated factors include ibuprofen. Patient denies any numbness, tingling, pain and/or weakness in the bilateral lower extremities. Denies contralateral hip pain. Patient denies low back pain. Patient denies any previous physical therapy. Patient has tried topical diclofenac with minimal relief. Patient denies any current pain medications.      MEDICAL HISTORY:  I have reviewed the patient's chart. The patient confirms the medical history listed below.  Past Medical History:   Diagnosis Date   • Benign neoplasm of colon 12/5/07    cecal polyps-tubular adenoma x2 & lymphoid aggregate x2   • Essential (primary) hypertension    • GERD (gastroesophageal reflux disease)    • Other and unspecified hyperlipidemia    • Unspecified hemorrhoids without mention of complication 12/5/07    internal        SURGICAL HISTORY:   I have reviewed the patient's chart. The patient confirms the surgical history listed below.  Past Surgical History:   Procedure Laterality Date   • Colonoscopy  11/25/2015    Dr. Thomason-repeat 5 years due to past adenoma   • Colonoscopy diagnostic  07/07/2021   • Esophagogastroduodenoscopy transoral flex diag  07/07/2021     Patient Active Problem List    Diagnosis Date Noted   • Tubular adenoma 09/29/2021     Priority: Low   • Essential (primary) hypertension 09/24/2020     Priority: Low   • Presbycusis of both ears 09/24/2018     Priority: Low   • Hematuria, microscopic 05/07/2018     Priority: Low   • Sloping hearing loss of both ears 05/26/2017     Priority: Low   • Inflamed seborrheic keratosis 07/30/2015     Priority: Low   • Angioma 07/30/2015     Priority: Low   • Solar dermatitis 07/30/2015     Priority: Low   • Sprain of medial collateral ligament of knee 12/04/2009     Priority: Low   • Other and unspecified derangement of medial meniscus 12/04/2009     Priority: Low   • Mixed hyperlipidemia      Priority: Low       FAMILY HISTORY:   I have reviewed the patient's chart. The patient confirms the family history below.  Family History   Problem Relation Age of Onset   • Hearing Loss Mother    • Heart Mother    • Hearing Loss Father    • Natural Causes Father    • Cancer Brother    • Cancer Brother         unknown        MEDICATIONS:  Current Outpatient Medications   Medication Sig Dispense Refill   • fluticasone (FLONASE) 50 MCG/ACT nasal spray SHAKE LIQUID AND USE 1 SPRAY IN EACH NOSTRIL TWICE DAILY 48 g 1   • omeprazole (PrilOSEC) 20 MG capsule TAKE 1 CAPSULE BY MOUTH DAILY 30 MINUTES BEFORE BREAKFAST 30 capsule 3   • diclofenac (VOLTAREN) 1 % gel Apply 4 g topically 4 times daily as needed (hip pain). 100 g 2   • rosuvastatin (CRESTOR) 5 MG tablet TAKE 1 TABLET BY MOUTH AT BEDTIME. DO NOT. START BEFORE OCTOBER 13, 2021 90 tablet 1   • fluorouracil (Efudex) 5 % cream Apply 9 AM and  3 PM to designated areas to the nose for 2 weeks  Wash fingers after each application 40 g 1   • losartan (COZAAR) 50 MG tablet Take 1 tablet by mouth nightly. 90 tablet 3   • aspirin 81 MG EC tablet Take 1 tablet by mouth daily.     • triamcinolone (KENALOG) 0.5 % cream Apply topically 2 times daily. (Patient taking differently: Apply topically 2 times daily as needed. ) 60 g 1   • MULTI-VITAMIN PO TABS one a day       No current facility-administered medications for this visit.       ALLERGIES:  ALLERGIES:  No Known Allergies    SOCIAL HISTORY:  Social History     Tobacco Use   • Smoking status: Former Smoker     Packs/day: 1.00     Years: 10.00     Pack years: 10.00     Types: Cigarettes     Quit date:      Years since quittin.6   • Smokeless tobacco: Never Used   Vaping Use   • Vaping Use: never used   Substance Use Topics   • Alcohol use: Yes     Alcohol/week: 2.0 standard drinks     Types: 2 Standard drinks or equivalent per week   • Drug use: No       REVIEW OF SYSTEMS:  I have reviewed the patient's chart. The patient confirms the review of systems below.  Constitutional:  The patient denies any recent weight changes, fever or chills.  Eyes:  The patient denies blurred vision, double vision or eye pain.  Ears, Nose, Throat:  The patient denies any problems with hearing, epistaxis or difficulty swallowing.  Cardiovascular:  The patient denies any recent chest pain, racing heart beat or vertigo.  Respiratory:  The patient denies any recent difficulty with breathing or coughing.  Gastrointestinal:  The patient denies any recent nausea, vomiting or incontinence/retention of bowels.  Genitourinary:  The patient denies painful urination or incontinence/retention of bladder.  Musculoskeletal:  Please see above in history of present illness.  Neurologic:  Please see above in history of present illness.  Hematologic:  Patient denies easy bruising or bleeding.   Integumentary:  Patient denies any recent  rashes and jaundice.      PHYSICAL EXAM:  Visit Vitals  /68 (BP Location: RUE - Right upper extremity, Patient Position: Sitting, Cuff Size: Large Adult)   Ht 6' (1.829 m)   Wt 116.2 kg (256 lb 3.2 oz)   BMI 34.75 kg/m²    Patient is alert and oriented x4, in no acute distress, pleasant and cooperative. Patient appears stated age. No scleral injection. Skin intact without lower extremity edema. Mood and affect are appropriate. Unlabored breathing without any retractions. Bilateral posterior tibialis pulses present. There is no lymphadenopathy noted in bilateral superficial inguinal areas. Both hips are negative for joint deformity, swelling or ecchymosis. There is tenderness to palpation over the right anterior groin greater than the right greater trochanter and proximal iliotibial band. There is decreased range of motion in right hip external rotation. Bilateral lower extremity strength is grossly intact. Antalgic gait. PLACIDO is positive for lateral hip pain. FADIR is negative.        IMAGING:  #. Right hip x-rays (3/30/22)  FINDINGS: There is no evidence of acute fracture nor dislocation. Medial joint space narrowing is noted. There are small marginal osteophytes at the femoral head neck junction. There are several calcified pelvic phleboliths. Soft tissues are otherwise unremarkable.  IMPRESSION: Mild to moderate degenerative changes of the right hip.      IMPRESSION:  #. Right hip pain  #. Right hip osteoarthritis - Symptomatic  - Status post right hip intra-articular injection under ultrasound guidance on 5/3/22 with 85% relief that is ongoing, 8/15/22  #. Right greater trochanteric pain syndrome  #. Right gluteus medius tendinopathy      ASSESSMENT:  The patient reports to the office today for a follow up appointment regarding their right hip pain. The pain started in spring 2022 with no reported mechanism of injury however patient did note that he did begin taking Crestor just prior to the onset of pain  and is unsure if it is related. Patient was last seen on 5/3/22 and was recommended the following plan of care: Right hip intra-articular injection, provided with stretching and strengthening exercises for the bilateral lower extremities and core, continue to use ibuprofen and topical diclofenac as needed for pain management. Patient declined any further medications at that time. Status post right hip intra-articular injection under ultrasound guidance on 5/3/22 with 85% relief that is ongoing. Patient reports that pain is located in the right anterior hip, described as achy sensations and pain is rated 1-2/10. Aggravating factors include putting on shoes and pants, lying on the right side and going up the stairs and alleviated factors include ibuprofen. Patient denies any numbness, tingling, pain and/or weakness in the bilateral lower extremities. Denies contralateral hip pain. Patient denies low back pain. Patient denies any previous physical therapy. Patient has tried topical diclofenac with minimal relief. Patient denies any current pain medications. Upon examination there is tenderness to palpation over the right anterior groin greater than the right greater trochanter and proximal iliotibial band. There is decreased range of motion in right hip external rotation. Bilateral lower extremity strength is grossly intact. Antalgic gait. PLACIDO is positive for lateral hip pain. FADIR is negative. Based off the patient's history, exam, and imaging findings, I believe that the patient has symptomatic right hip osteoarthritis and I recommend the following plan of care.         PLAN:  #. Procedure(s): Due to the patient's good relief, I recommend a repeat right hip intra-articular injection under ultrasound guidance today.   #. Rehabilitation: Patient was encouraged to maintain a healthy and active lifestyle as tolerated.   #. Medication(s): No new orders were placed today.  #. Imaging: No new orders were placed  today.  #. Labs: No new orders were placed today.  #. Referral(s): No new orders were placed today.  #. Follow up: Patient will follow up with my clinic as needed. Right hip intra-articular injection may be repeated no sooner than every 3 months.   #. Patient expressed understanding and agreement with the plan of care. All questions and concerns were addressed.      PROCEDURE: Right hip intra-articular injection under ultrasound guidance    INDICATION: Right hip osteoarthritis / hip pain    JUSTIFICATION of ULTRASOUND USE: Improves accuracy of medication administration and improves patient safety by avoiding neurovascular structures. The procedure was performed under ultrasound guidance using an ultrasound machine, following standard protocol. The images taken were archived on the ultrasound hard drive and posted to PACS permanently.    IMAGING:  #. Right hip x-rays (3/30/22)  FINDINGS: There is no evidence of acute fracture nor dislocation. Medial joint space narrowing is noted. There are small marginal osteophytes at the femoral head neck junction. There are several calcified pelvic phleboliths. Soft tissues are otherwise unremarkable.  IMPRESSION: Mild to moderate degenerative changes of the right hip.    Visit Vitals  /68 (BP Location: RUE - Right upper extremity, Patient Position: Sitting, Cuff Size: Large Adult)   Ht 6' (1.829 m)   Wt 116.2 kg (256 lb 3.2 oz)   BMI 34.75 kg/m²      #. Right hip intra-articular injection under ultrasound guidance  The risks (bleeding, infection, nerve damage, tendon/ligament damage and increased pain) and benefits (decreased pain and improved function) were discussed with the patient today in clinic. Side effects of cortisone were discussed including headache, elevated blood sugars, facial flushing, insomnia and euphoria. Patient expressed understanding and was in agreement with procedure. Verbal consent was obtained to proceed. The patient was positioned supine on the  exam table, with the right hip in slight external rotation. The femoral artery was marked and identified with color doppler. The skin was prepped and draped in a sterile fashion.  The skin was anesthetized with ethyl chloride spray. The ultrasound transducer was placed in anatomic transverse oblique plane. Ultrasound visualization was used to maximize patient safety and to improve accuracy of injection. A 22-gauge spinal needle was advanced to the femoral head/neck junction with ultrasound guidance. The joint was injected with 2 mL of dexamethasone (4 mg/mL) and 3 mL of  0.25% bupivacaine. The spinal needle was removed, the skin was cleaned, and a Band-Aid was applied. The patient tolerated the procedure well and there were no immediate complications. Patient was given wound and infection precautions.     IMPRESSION:  #. Right hip osteoarthritis / hip pain    PLAN:  #. No submersion in any body of water (bathtub, hot tub or pool) for next 5 days.  #. No running, jumping or heavy lifting with the extremity for the next 5 days.  #. If having fever, chills or rash, please call the clinic at [phone].      On 8/15/2022, Shelley PEÑA LAT scribed the services personally performed by Cori Simpson DO    The documentation recorded by the licensed  accurately and completely reflects the service(s) I personally performed and the decisions made by me.     Cori Simpson DO  Board Certified in Sports Medicine and Physical Medicine and Rehabilitation  [facility] Musculoskeletal Ultrasound Certified    Never

## 2024-08-16 NOTE — H&P ADULT - NSHPLABSRESULTS_GEN_ALL_CORE
WBC Count: 8.76 K/uL  RBC Count: 4.35 M/uL  Hemoglobin: 12.8 g/dL  Hematocrit: 39.3 %  Mean Cell Volume: 90.3 fl  Mean Cell Hemoglobin: 29.4 pg  Mean Cell Hemoglobin Conc: 32.6 gm/dL  Red Cell Distrib Width: 15.7 %  Platelet Count - Automated: 245 K/uL    Sodium: 128 mmol/L  Potassium: 4.2 mmol/L  Chloride: 92 mmol/L  Carbon Dioxide: 24 mmol/L  Anion Gap: 12 mmol/L  Blood Urea Nitrogen: 16 mg/dL  Creatinine: 0.78 mg/dL  Glucose: 81 mg/dL  Calcium: 9.5 mg/dL  Protein Total: 7.9 g/dL  Albumin: 3.4 g/dL  Bilirubin Total: 1.5 mg/dL  Alkaline Phosphatase: 978  Aspartate Aminotransferase (AST/SGOT): 158  Alanine Aminotransferase (ALT/SGPT): 71    Lactate, Blood: 4.3 mmol/L    Lipase: 79 U/L WBC Count: 8.76 K/uL  RBC Count: 4.35 M/uL  Hemoglobin: 12.8 g/dL  Hematocrit: 39.3 %  Mean Cell Volume: 90.3 fl  Mean Cell Hemoglobin: 29.4 pg  Mean Cell Hemoglobin Conc: 32.6 gm/dL  Red Cell Distrib Width: 15.7 %  Platelet Count - Automated: 245 K/uL    Sodium: 128 mmol/L  Potassium: 4.2 mmol/L  Chloride: 92 mmol/L  Carbon Dioxide: 24 mmol/L  Anion Gap: 12 mmol/L  Blood Urea Nitrogen: 16 mg/dL  Creatinine: 0.78 mg/dL  Glucose: 81 mg/dL  Calcium: 9.5 mg/dL  Protein Total: 7.9 g/dL  Albumin: 3.4 g/dL  Bilirubin Total: 1.5 mg/dL  Alkaline Phosphatase: 978  Aspartate Aminotransferase (AST/SGOT): 158  Alanine Aminotransferase (ALT/SGPT): 71    Lactate, Blood: 4.3 mmol/L    Lipase: 79 U/L    Rewviewed CT report: gastric tumor with liveer mets.

## 2024-08-16 NOTE — H&P ADULT - PROBLEM SELECTOR PLAN 3
Bilirubin Total: 1.5 mg/dL  Alkaline Phosphatase: 978  AST: 158  ALT: 71  Suspected to be due to metastatic infiltration from either singular or multiple mets    -f/u fractionated bili in AM  -GI and Onc following  -RUQ abd U/S w/ doppler as per GI recs  -trend CMP  -MRI abdomen pending, possible MRCP

## 2024-08-16 NOTE — CONSULT NOTE ADULT - ASSESSMENT
73 year old male, PMH HTN, HLD, CAD w/ stents, presenting with SOB, recent diagnosis of stomach adenocarcinoma in [facility] Oncology consulted for further recommendations    #Newly diagnosed gastric adenocarcinoma in [facility]  #Transaminitis likely 2/2 to infiltrative disease   - Patient recently visiting family in [facility], had full workup performed there, including egd with pathology showing adenocarcinoma and imaging showing mets to the lungs and liver. Patient complains of shortness of breath for the past week worse with exertion and for the past 2 days complaining of nausea and epigastric pain with the eating.  No vomiting.  Denies fevers, chills, chest pain, diarrhea.  In the ED, Hgb 12.8, Bili 1.5, Alk Phos 978, , ALT 71, lactate 5.4.   - CTA negative for PE or pulmonary mets, show mass within the wall of the lesser curvature of the gastric body   representing the patient's gastric malignancy, many hepatics mets and gastrohepatic lymphadenopathy.   - Appreciate GI eval: agree will require repeat diagnostic biopsy   - Once biopsy performed, will arrange for outpatient follow up at [facility]  - No plans for inpatient chemo/immunotherapy   - Oncology team to follow    Note not finalized until signed by attending.   Please do not hesitate to page with questions.     Luh Masters MD  Hematology/Oncology Fellow PGY5  Available on [employer] Teams   Pager: [phone]  For weekends and evenings (5 pm - 8 am), please page fellow on call. 73 year old male, PMH HTN, HLD, CAD w/ stents, presenting with SOB, recent diagnosis of stomach adenocarcinoma in [facility] Oncology consulted for further recommendations    #Newly diagnosed gastric adenocarcinoma in [facility]  #Transaminitis likely 2/2 to infiltrative disease   - Patient recently visiting family in [facility], had full workup performed there, including egd with pathology showing adenocarcinoma and imaging showing mets to the lungs and liver. Patient complains of shortness of breath for the past week worse with exertion and for the past 2 days complaining of nausea and epigastric pain with the eating. In the ED, Hgb 12.8, Bili 1.5, Alk Phos 978, , ALT 71, lactate 5.4.   - CTA negative for PE or pulmonary mets, show mass within the wall of the lesser curvature of the gastric body   representing the patient's gastric malignancy, many hepatics mets and gastrohepatic lymphadenopathy.   - Appreciate GI eval: agree will require repeat diagnostic biopsy (likely IR guided biopsy of liver lesion or hepatic adenopathy)  - Once biopsy performed, will arrange for outpatient follow up at [facility]  - No plans for inpatient chemo/immunotherapy   - Oncology team to follow    **INCOMPLETE NOTE**    Note not finalized until signed by attending.   Please do not hesitate to page with questions.     Luh Masters MD  Hematology/Oncology Fellow PGY5  Available on [employer] Teams   Pager: [phone]  For weekends and evenings (5 pm - 8 am), please page fellow on call. 73 year old male, PMH HTN, HLD, CAD w/ stents, presenting with SOB, recent diagnosis of stomach adenocarcinoma in [facility] Oncology consulted for further recommendations    #Newly diagnosed gastric adenocarcinoma in [facility]  #Transaminitis likely 2/2 to infiltrative disease   - Patient recently visiting family in [facility], had full workup performed there, including egd with pathology showing adenocarcinoma and imaging showing mets to the lungs and liver. Patient complains of shortness of breath for the past week worse with exertion and for the past 2 days complaining of nausea and epigastric pain with the eating. In the ED, Hgb 12.8, Bili 1.5, Alk Phos 978, , ALT 71, lactate 5.4.   - CTA negative for PE or pulmonary mets, show mass within the wall of the lesser curvature of the gastric body   representing the patient's gastric malignancy, many hepatics mets and gastrohepatic lymphadenopathy.   - Appreciate GI eval: agree will require repeat diagnostic biopsy (likely IR guided biopsy of liver lesion or hepatic adenopathy); will require HER2, programmed death ligand 1 (PD-L1),and microsatellite testing/NGS.  - Once biopsy performed, will arrange for outpatient follow up at [facility]  - No plans for inpatient chemo/immunotherapy   - Oncology team to follow    **INCOMPLETE NOTE**    Note not finalized until signed by attending.   Please do not hesitate to page with questions.     Luh Masters MD  Hematology/Oncology Fellow PGY5  Available on [employer] Teams   Pager: [phone]  For weekends and evenings (5 pm - 8 am), please page fellow on call. 73 year old male, PMH HTN, HLD, CAD w/ stents, presenting with SOB, recent diagnosis of stomach adenocarcinoma in [facility] Oncology consulted for further recommendations    #Newly diagnosed gastric adenocarcinoma in [facility]  #Transaminitis likely 2/2 to infiltrative disease   - Patient recently visiting family in [facility], had full workup performed there for epigastric pain and melena, including egd with pathology showing adenocarcinoma and imaging showing mets to liver and surround adenopathy.. Patient complains of shortness of breath for the past week worse with exertion and for the past 2 days complaining of nausea and epigastric pain with the eating with subjective 5kg weight loss over past few days. In the ED, Hgb 12.8, Bili 1.5, Alk Phos 978, , ALT 71, lactate 5.4. Reports last screening colonoscopy was 2011 and 2018. No prior endoscopy apart from this past EGD in [facility] Family brought reports from [facility] at beside   - CTA negative for PE or pulmonary mets, show mass within the wall of the lesser curvature of the gastric body   representing the patient's gastric malignancy, many hepatics mets and gastrohepatic lymphadenopathy.   - Appreciate GI eval: agree will require repeat diagnostic biopsy (likely IR guided biopsy of liver lesion or hepatic adenopathy); will require HER2, programmed death ligand 1 (PD-L1),and microsatellite testing/NGS.  - Please send HIV, hepatitis panel, H-pylori testing   - Once biopsy performed, will arrange for outpatient follow up at [facility]  - No plans for inpatient chemo/immunotherapy   - Oncology team to follow  - Plan discussed with son and patient at bedside.     **INCOMPLETE NOTE**    Note not finalized until signed by attending.   Please do not hesitate to page with questions.     Luh Masters MD  Hematology/Oncology Fellow PGY5  Available on [employer] Teams   Pager: [phone]  For weekends and evenings (5 pm - 8 am), please page fellow on call. 73 year old male, PMH HTN, HLD, CAD w/ stents, presenting with SOB, recent diagnosis of stomach adenocarcinoma in [facility] Oncology consulted for further recommendations    #Newly diagnosed metastatic gastric adenocarcinoma in [facility]  #Transaminitis likely 2/2 to infiltrative disease vs stricture  - Patient recently visiting family in [facility], had full workup performed there for epigastric pain and melena, including egd with pathology showing adenocarcinoma and imaging showing mets to liver and surround adenopathy.. Patient complains of shortness of breath for the past week worse with exertion and for the past 2 days complaining of nausea and epigastric pain with the eating with subjective 5kg weight loss over past few days. In the ED, Hgb 12.8, Bili 1.5, Alk Phos 978, , ALT 71, lactate 5.4. Reports last screening colonoscopy was 2011 and 2018. No prior endoscopy apart from this past EGD in [facility] Family brought reports from [facility] at beside   - CTA negative for PE or pulmonary mets, show mass within the wall of the lesser curvature of the gastric body   representing the patient's gastric malignancy, many hepatics mets and gastrohepatic lymphadenopathy.   - Appreciate GI eval: agree will require repeat diagnostic biopsy (likely IR guided biopsy of liver lesion or hepatic adenopathy); will require HER2, programmed death ligand 1 (PD-L1),and microsatellite testing/NGS.  - Please send HIV, hepatitis panel, H-pylori testing   - Pending MR pelvis  - Appreciate IR eval: pending IR guided liver bx scheduled for 8/20; will expedite results   - Advised on physical therapy and nutrition optimization   - Oncology team to follow  - Plan discussed with son and patient at bedside.     Note not finalized until signed by attending.   Please do not hesitate to page with questions.     Luh Masters MD  Hematology/Oncology Fellow PGY5  Available on [employer] Teams   Pager: [phone]  For weekends and evenings (5 pm - 8 am), please page fellow on call.

## 2024-08-16 NOTE — H&P ADULT - ATTENDING COMMENTS
Patient seen and evaluated on bedside rounds with son. In short, patient with CAD s/p stents (last 2023) presents from [facility] after UGIB with resultant workup revfealing metastatic gastric adenocarcinoma. Patient with worsening abdominal pain, WHALEN. No fever, chills, chest pain, SOB at rest, n/v/d/c. No furthre melena or hematochezia. Of note has been off of DAPT since 8/11 vs. 8/12 per son after bleeding episode in [facility]    PE:  GEN: NAD  CARDS; +S1S2 rrr No m/r/g  RESP; CTA b/l. No r/r/w  ABD: +RUQ TTP. +BS  EXT: No c/c/e    Imaging as above  Personally spoke to oncology team, prefers liver biopsy, if possible to hold ASA for biopsy.    #Metastatic gastric cancer  -biopsy proven in [facility]  -need additional staining to start therapy.  -for IR guided bx next week.  -r/b for ASA holding. If needs to be held for biopsy, would hold, if IR is ok with ASA for bx would continue.    #Elevated liver enzymes  -suspect in setting of tumor burden in liver, but will obtain MRI to ensure no dominant obstructive lesion  -monitor CMP    #Hx cAD  -TTE given WHALEN. On unofficial POCUS, no large pericardial effusion,   -ASA if ok with IR  -hold statin in setting of elevated liver enzymes.    #hypothyroidism'  -TSH in AM, continue with synthroid    #Advanced care planning  -patient wants son to be main decision maker. They want patient to be mentally "strong". Discussed palliative and , they agree to both.

## 2024-08-16 NOTE — ED ADULT NURSE NOTE - OBJECTIVE STATEMENT
74y/o Male presents to ED c/o SOB. Pt is Maltese speaking, son is at bedside to provide hx and translation. [place] recently travelled to [facility] returned 8/15/24. As per son, pt diagnosed with stomach cancer in [facility] and was recommended to initiate cancer treatment. Pt's [place]. Pt family [place] to [place] prior to [place] and have family present when presenting the news. Additional complaint pt states endorsing dyspnea on exertion x2weeks.  Pmhx HLD, 5 Cardiac stents. Pt is AxOx4. Pt airway is patent, breathing is shallow and unlabored. Pt skin is warm, dry color appropriate for ethnicity. Pt denies f/c/n/v/d. Pt is ambulatory and continent at baseline. Pt changed into gown, socks applied, VS measured. 20G IV catheter place, blood drawn and sent to lab. Stretcher locked and in lowest position, appropriate side rails up. Pt instructed to notify RN if assistance is needed.

## 2024-08-16 NOTE — H&P ADULT - PROBLEM SELECTOR PLAN 5
5 stents, 2011 x2, 2013 x1, 2014 x1, 2023x1.    -Will hold lipitor 40mg in setting of elevated LFTs  -Will continue with ASA 81

## 2024-08-16 NOTE — ED ADULT NURSE REASSESSMENT NOTE - NS ED NURSE REASSESS COMMENT FT1
500ml 0.9% NS discontinued as per MD Meredith, at bedside. Stretcher locked and in lowest position, appropriate side rails up. Pt instructed to notify RN if assistance is needed.

## 2024-08-16 NOTE — CONSULT NOTE ADULT - ASSESSMENT
73 year old male, PMH HTN, HLD, CAD w/ stents, presenting with SOB, recent diagnosis of stomach adenocarcinoma. Found to have elevated liver enzymes.   Elevated liver tests could be 2/2 hepatic mets, though predominantly alk phos, c/w cholestatic. CT abdomen without and duct dilation or stones visualized.     #Gastric adenocarcinoma  #CAD w/ stents  #Elevated liver enzymes     Recommendations:   -oncology consult, may need repeat EGD for tissue pathology   -RUQ US for elevated liver enzymes  -aspirin will likely increase GI bleeding, though if needed for patient's stents then can continue     incomplete note    Note incomplete until finalized by attending signature/attestation.    Sandy Landeros  GI/Hepatology Fellow    MONDAY-FRIDAY 8AM-5PM:  Pager# [phone] ([facility]) or [phone] ([facility])    NON-URGENT CONSULTS:  Please email [email or web address]  AT NIGHT AND ON WEEKENDS:  Contact on-call GI fellow from 5pm-8am and on weekends/holidays   73 year old male, PMH HTN, HLD, CAD w/ stents, presenting with SOB, recent diagnosis of stomach adenocarcinoma. Found to have elevated liver enzymes.   Elevated liver tests could be 2/2 hepatic mets, though predominantly alk phos, c/w cholestatic. CT abdomen without and duct dilation or stones visualized.     #Gastric adenocarcinoma  #CAD w/ stents  #Elevated liver enzymes     Recommendations:   -oncology consult, may need repeat EGD for tissue pathology vs IR liver biopsy   -RUQ US with doppler for elevated liver enzymes  -hepatitis panel  -aspirin will likely increase GI bleeding, though if needed for patient's stents then can continue       Note incomplete until finalized by attending signature/attestation.    Sandy Landeros  GI/Hepatology Fellow    MONDAY-FRIDAY 8AM-5PM:  Pager# [phone] ([facility]) or [phone] ([facility])    NON-URGENT CONSULTS:  Please email [email or web address]  AT NIGHT AND ON WEEKENDS:  Contact on-call GI fellow from 5pm-8am and on weekends/holidays   73 year old male, PMH HTN, HLD, CAD w/ stents, presenting with SOB, recent diagnosis of stomach adenocarcinoma. Found to have elevated liver enzymes.   Elevated liver tests could be 2/2 hepatic mets, though predominantly alk phos, c/w cholestatic. CT abdomen without and duct dilation or stones visualized.     #Gastric adenocarcinoma  #CAD w/ stents  #Elevated liver enzymes     Recommendations:   -oncology consult  -IR liver biopsy for tissue and for staining  -If additional biopsy of gastric mass is needed, please let us know, but otherwise IR biopsy should suffice for now  -RUQ US with doppler for elevated liver enzymes  -hepatitis panel  -aspirin will likely somewhat increase GI bleeding, though if needed for patient's stents then no GI contraindication to administer as benefits likely outweighs risks and patient has no signs of active bleeding      Note incomplete until finalized by attending signature/attestation.    Sandy Landeros  GI/Hepatology Fellow    MONDAY-FRIDAY 8AM-5PM:  Pager# [phone] ([facility]) or [phone] ([facility])    NON-URGENT CONSULTS:  Please email [email or web address]  AT NIGHT AND ON WEEKENDS:  Contact on-call GI fellow from 5pm-8am and on weekends/holidays

## 2024-08-17 NOTE — PROGRESS NOTE ADULT - ASSESSMENT
74 y/o M, PMH CAD w/5 stents, HTN, HLD, recent Dx of gastric adenocarcinoma, Nepali speaking, presents with burning epigastric pain associated with eating, shortness of breath on exertion x 2 weeks. [facility] for the abdominal pain, constipation and black stools on 8/12, which showed a bleeding gastric ulcer, biopsy was taken and hemostasis achieved, Biopsy positive for gastric adenocarcinoma. CT imaging revealed metastasis to liver and adjacent lymph nodes. He elected to pursue further care here in the [place], and deferred any treatment [facility].  74 y/o M, PMH CAD w/5 stents, HTN, HLD, recent Dx of gastric adenocarcinoma, Welsh speaking, presents with burning epigastric pain associated with eating, shortness of breath on exertion x 2 weeks. He was evaluated in [facility] with endoscopy for the abdominal pain, constipation and black stools on 8/12, which showed a bleeding gastric ulcer, biopsy was taken and hemostasis achieved, Biopsy positive for gastric adenocarcinoma. CT imaging revealed metastasis to liver and adjacent lymph nodes. He elected to pursue further care here in the [place], and deferred any treatment in [facility].  72 y/o M, PMH CAD w/5 stents, HTN, HLD, recent Dx of gastric adenocarcinoma, [place] speaking, presents with burning epigastric pain associated with eating, shortness of breath on exertion x 2 weeks. He was evaluated in [facility] with endoscopy for the abdominal pain, constipation and black stools on 8/12, which showed a bleeding gastric ulcer, biopsy was taken and hemostasis achieved, Biopsy positive for gastric adenocarcinoma. CT imaging revealed metastasis to liver and adjacent lymph nodes. He elected to pursue further care here in the [place], and deferred any treatment in [facility]. Awaiting liver biopsy 8/20.

## 2024-08-17 NOTE — PROGRESS NOTE ADULT - SUBJECTIVE AND OBJECTIVE BOX
***************************************************************  Bony Alexander, PGY 1   Internal Medicine   ***************************************************************    RAJENDRA VASQUEZ  73y  MRN: [id number]    Patient is a 73y old  Male who presents with a chief complaint of Gastric adenocarcinoma (16 Aug 2024 15:23)      Subjective: no events ON. Denies fever, CP, SOB, abn pain, N/V, dysuria. Tolerating diet.      MEDICATIONS  (STANDING):  amLODIPine   Tablet 5 milliGRAM(s) Oral daily  finasteride 5 milliGRAM(s) Oral daily  levothyroxine 50 MICROGram(s) Oral daily  tamsulosin 0.4 milliGRAM(s) Oral at bedtime    MEDICATIONS  (PRN):  acetaminophen     Tablet .. 650 milliGRAM(s) Oral every 6 hours PRN Temp greater or equal to 38C (100.4F), Mild Pain (1 - 3)  aluminum hydroxide/magnesium hydroxide/simethicone Suspension 30 milliLiter(s) Oral every 4 hours PRN Dyspepsia  melatonin 3 milliGRAM(s) Oral at bedtime PRN Insomnia  ondansetron Injectable 4 milliGRAM(s) IV Push every 8 hours PRN Nausea and/or Vomiting      Objective:    Vitals: Vital Signs Last 24 Hrs  T(C): 36.4 (08-17-24 @ 05:03), Max: 37.3 (08-16-24 @ 17:26)  T(F): 97.5 (08-17-24 @ 05:03), Max: 99.2 (08-16-24 @ 17:26)  HR: 64 (08-17-24 @ 05:03) (64 - 90)  BP: 137/76 (08-17-24 @ 05:03) (118/68 - 148/80)  BP(mean): 91 (08-16-24 @ 12:51) (91 - 91)  RR: 18 (08-17-24 @ 05:03) (16 - 18)  SpO2: 96% (08-17-24 @ 05:03) (94% - 96%)            I&O's Summary      PHYSICAL EXAM:  GENERAL: NAD  HEAD:  Atraumatic, Normocephalic  EYES: EOMI, conjunctiva and sclera clear  CHEST/LUNG: Clear to auscultation bilaterally; No rales, rhonchi, wheezing, or rubs  HEART: Regular rate and rhythm; No murmurs, rubs, or gallops  ABDOMEN: Soft, Nontender, Nondistended;   SKIN: No rashes or lesions  NERVOUS SYSTEM:  Alert & Oriented X3, no focal deficits    LABS:  08-17    128<L>  |  90<L>  |  16  ----------------------------<  73  4.2   |  22  |  0.77  08-16    128<L>  |  92<L>  |  16  ----------------------------<  81  4.2   |  24  |  0.78  08-16    127<L>  |  91<L>  |  17  ----------------------------<  81  See Note   |  21<L>  |  0.76    Ca    9.2      17 Aug 2024 06:56  Ca    9.5      16 Aug 2024 07:29  Ca    9.8      16 Aug 2024 04:53  Phos  2.2     08-17  Mg     2.0     08-17    TPro  6.7  /  Alb  3.0<L>  /  TBili  3.0<H>  /  DBili  2.1<H>  /  AST  136<H>  /  ALT  72<H>  /  AlkPhos  881<H>  08-17  TPro  7.9  /  Alb  3.4  /  TBili  1.5<H>  /  DBili  x   /  AST  158<H>  /  ALT  71<H>  /  AlkPhos  978<H>  08-16                    Urinalysis Basic - ( 17 Aug 2024 06:56 )    Color: x / Appearance: x / SG: x / pH: x  Gluc: 73 mg/dL / Ketone: x  / Bili: x / Urobili: x   Blood: x / Protein: x / Nitrite: x   Leuk Esterase: x / RBC: x / WBC x   Sq Epi: x / Non Sq Epi: x / Bacteria: x                              12.1   7.33  )-----------( 155      ( 17 Aug 2024 06:56 )             36.8                         12.8   8.76  )-----------( 245      ( 16 Aug 2024 04:53 )             39.3     CAPILLARY BLOOD GLUCOSE          RADIOLOGY & ADDITIONAL TESTS:    Imaging Personally Reviewed:  [x ] YES  [ ] NO    Consultants involved in case:   Consultant(s) Notes Reviewed:  [ x] YES  [ ] NO:   Care Discussed with Consultants/Other Providers [x ] YES  [ ] NO         ***************************************************************  Bony Alexander, PGY 1   Internal Medicine   ***************************************************************    RAJENDRA VASQUEZ  73y  MRN: [id number]    Patient is a 73y old  Male who presents with a chief complaint of Gastric adenocarcinoma (16 Aug 2024 15:23)      Subjective: no events ON. Denies fever, CP, SOB, N/V, dysuria. pt states he ate a small amount this morning without any pain, reports bloating afterwards.      MEDICATIONS  (STANDING):  amLODIPine   Tablet 5 milliGRAM(s) Oral daily  finasteride 5 milliGRAM(s) Oral daily  levothyroxine 50 MICROGram(s) Oral daily  tamsulosin 0.4 milliGRAM(s) Oral at bedtime    MEDICATIONS  (PRN):  acetaminophen     Tablet .. 650 milliGRAM(s) Oral every 6 hours PRN Temp greater or equal to 38C (100.4F), Mild Pain (1 - 3)  aluminum hydroxide/magnesium hydroxide/simethicone Suspension 30 milliLiter(s) Oral every 4 hours PRN Dyspepsia  melatonin 3 milliGRAM(s) Oral at bedtime PRN Insomnia  ondansetron Injectable 4 milliGRAM(s) IV Push every 8 hours PRN Nausea and/or Vomiting      Objective:    Vitals: Vital Signs Last 24 Hrs  T(C): 36.4 (08-17-24 @ 05:03), Max: 37.3 (08-16-24 @ 17:26)  T(F): 97.5 (08-17-24 @ 05:03), Max: 99.2 (08-16-24 @ 17:26)  HR: 64 (08-17-24 @ 05:03) (64 - 90)  BP: 137/76 (08-17-24 @ 05:03) (118/68 - 148/80)  BP(mean): 91 (08-16-24 @ 12:51) (91 - 91)  RR: 18 (08-17-24 @ 05:03) (16 - 18)  SpO2: 96% (08-17-24 @ 05:03) (94% - 96%)            I&O's Summary      PHYSICAL EXAM:  GENERAL: NAD  HEAD:  Atraumatic, Normocephalic  EYES: EOMI, conjunctiva and sclera clear  CHEST/LUNG: Clear to auscultation bilaterally; No rales, rhonchi, wheezing, or rubs  HEART: Regular rate and rhythm; No murmurs, rubs, or gallops  ABDOMEN: Epigastric and RUQ TTP, abdomen soft, nondistended, bowel sounds present.  EXT: L Hand: Small 3cm circular area of redness with associated induration and TTP over dorsum of L hand, no streaking, no spreading redness, no fluctuance.                      Sensorimotor function of L hand intact.           LE: trace LE b/l pitting edema, no calf tenderness.   SKIN: No rashes or lesions  NERVOUS SYSTEM:  Alert & Oriented X3, no focal deficits    LABS:  08-17    128<L>  |  90<L>  |  16  ----------------------------<  73  4.2   |  22  |  0.77  08-16    128<L>  |  92<L>  |  16  ----------------------------<  81  4.2   |  24  |  0.78  08-16    127<L>  |  91<L>  |  17  ----------------------------<  81  See Note   |  21<L>  |  0.76    Ca    9.2      17 Aug 2024 06:56  Ca    9.5      16 Aug 2024 07:29  Ca    9.8      16 Aug 2024 04:53  Phos  2.2     08-17  Mg     2.0     08-17    TPro  6.7  /  Alb  3.0<L>  /  TBili  3.0<H>  /  DBili  2.1<H>  /  AST  136<H>  /  ALT  72<H>  /  AlkPhos  881<H>  08-17  TPro  7.9  /  Alb  3.4  /  TBili  1.5<H>  /  DBili  x   /  AST  158<H>  /  ALT  71<H>  /  AlkPhos  978<H>  08-16                    Urinalysis Basic - ( 17 Aug 2024 06:56 )    Color: x / Appearance: x / SG: x / pH: x  Gluc: 73 mg/dL / Ketone: x  / Bili: x / Urobili: x   Blood: x / Protein: x / Nitrite: x   Leuk Esterase: x / RBC: x / WBC x   Sq Epi: x / Non Sq Epi: x / Bacteria: x                              12.1   7.33  )-----------( 155      ( 17 Aug 2024 06:56 )             36.8                         12.8   8.76  )-----------( 245      ( 16 Aug 2024 04:53 )             39.3     CAPILLARY BLOOD GLUCOSE          RADIOLOGY & ADDITIONAL TESTS:    Imaging Personally Reviewed:  [x ] YES  [ ] NO    Consultants involved in case:   Consultant(s) Notes Reviewed:  [ x] YES  [ ] NO:   Care Discussed with Consultants/Other Providers [x ] YES  [ ] NO         ***************************************************************  Bony Alexander, PGY 1   Internal Medicine   ***************************************************************    RAJENDRA VASQUEZ  73y  MRN: [id number]    Patient is a 73y old  Male who presents with a chief complaint of Gastric adenocarcinoma (16 Aug 2024 15:23)      Subjective: no events ON. Denies fever, CP, SOB, N/V, dysuria. pt states he ate a small amount this morning without any pain, reports bloating afterwards.    seen with son and niece at bedside      Objective:    Vitals: Vital Signs Last 24 Hrs  T(C): 36.4 (08-17-24 @ 05:03), Max: 37.3 (08-16-24 @ 17:26)  T(F): 97.5 (08-17-24 @ 05:03), Max: 99.2 (08-16-24 @ 17:26)  HR: 64 (08-17-24 @ 05:03) (64 - 90)  BP: 137/76 (08-17-24 @ 05:03) (118/68 - 148/80)  BP(mean): 91 (08-16-24 @ 12:51) (91 - 91)  RR: 18 (08-17-24 @ 05:03) (16 - 18)  SpO2: 96% (08-17-24 @ 05:03) (94% - 96%)            I&O's Summary      PHYSICAL EXAM:  GENERAL: NAD  HEAD:  Atraumatic, Normocephalic  EYES: EOMI, conjunctiva and sclera clear  CHEST/LUNG: Clear to auscultation bilaterally; No rales, rhonchi, wheezing, or rubs  HEART: Regular rate and rhythm; No murmurs, rubs, or gallops  ABDOMEN: Epigastric and RUQ TTP, abdomen soft, nondistended, bowel sounds present.  EXT: L Hand: Small 3cm circular area of redness with associated induration and TTP over dorsum of L hand, no streaking, no spreading redness, no fluctuance.                      Sensorimotor function of L hand intact.           LE: trace LE b/l pitting edema, no calf tenderness.   SKIN: No rashes or lesions  NERVOUS SYSTEM:  Alert & Oriented X3, no focal deficits    LABS:  08-17    128<L>  |  90<L>  |  16  ----------------------------<  73  4.2   |  22  |  0.77  08-16    128<L>  |  92<L>  |  16  ----------------------------<  81  4.2   |  24  |  0.78  08-16    127<L>  |  91<L>  |  17  ----------------------------<  81  See Note   |  21<L>  |  0.76    Ca    9.2      17 Aug 2024 06:56  Ca    9.5      16 Aug 2024 07:29  Ca    9.8      16 Aug 2024 04:53  Phos  2.2     08-17  Mg     2.0     08-17    TPro  6.7  /  Alb  3.0<L>  /  TBili  3.0<H>  /  DBili  2.1<H>  /  AST  136<H>  /  ALT  72<H>  /  AlkPhos  881<H>  08-17  TPro  7.9  /  Alb  3.4  /  TBili  1.5<H>  /  DBili  x   /  AST  158<H>  /  ALT  71<H>  /  AlkPhos  978<H>  08-16                    Urinalysis Basic - ( 17 Aug 2024 06:56 )    Color: x / Appearance: x / SG: x / pH: x  Gluc: 73 mg/dL / Ketone: x  / Bili: x / Urobili: x   Blood: x / Protein: x / Nitrite: x   Leuk Esterase: x / RBC: x / WBC x   Sq Epi: x / Non Sq Epi: x / Bacteria: x                              12.1   7.33  )-----------( 155      ( 17 Aug 2024 06:56 )             36.8                         12.8   8.76  )-----------( 245      ( 16 Aug 2024 04:53 )             39.3     CAPILLARY BLOOD GLUCOSE          RADIOLOGY & ADDITIONAL TESTS:    Imaging Personally Reviewed:  [x ] YES  [ ] NO    Consultants involved in case:   Consultant(s) Notes Reviewed:  [ x] YES  [ ] NO:   Care Discussed with Consultants/Other Providers [x ] YES  [ ] NO    MEDICATIONS  (STANDING):  amLODIPine   Tablet 5 milliGRAM(s) Oral daily  finasteride 5 milliGRAM(s) Oral daily  lactated ringers. 1000 milliLiter(s) (100 mL/Hr) IV Continuous <Continuous>  levothyroxine 50 MICROGram(s) Oral daily  sodium phosphate 15 milliMole(s)/250 mL IVPB 15 milliMole(s) IV Intermittent once  tamsulosin 0.4 milliGRAM(s) Oral at bedtime    MEDICATIONS  (PRN):  acetaminophen     Tablet .. 650 milliGRAM(s) Oral every 6 hours PRN Temp greater or equal to 38C (100.4F), Mild Pain (1 - 3)  aluminum hydroxide/magnesium hydroxide/simethicone Suspension 30 milliLiter(s) Oral every 4 hours PRN Dyspepsia  melatonin 3 milliGRAM(s) Oral at bedtime PRN Insomnia  ondansetron Injectable 4 milliGRAM(s) IV Push every 8 hours PRN Nausea and/or Vomiting

## 2024-08-17 NOTE — PROGRESS NOTE ADULT - PROBLEM SELECTOR PLAN 3
Bilirubin Total: 1.5 mg/dL  Alkaline Phosphatase: 978  AST: 158  ALT: 71  Suspected to be due to metastatic infiltration from either singular or multiple mets    -f/u fractionated bili in AM  -GI and Onc following  -RUQ abd U/S w/ doppler as per GI recs  -trend CMP  -MRI abdomen pending, possible MRCP Bilirubin Total: 3.0 <-1.5,   Direct bili 2.0, Indirect 0.9  Alkaline Phosphatase: 881 <-978  AST: 136 <- 158  ALT: 72 <- 71  Suspected to be due to metastatic infiltration from either singular or multiple mets    -GI and Onc following  -RUQ abd U/S w/ doppler as per GI recs  -trend CMP  -MRI abdomen pending, possible MRCP Bilirubin Total: 3.0 <-1.5,   Direct bili 2.1, Indirect 0.9  Alkaline Phosphatase: 881 <-978  AST: 136 <- 158  ALT: 72 <- 71  Suspected to be due to metastatic infiltration from either singular or multiple mets    -GI and Onc following  -RUQ abd U/S w/ doppler as per GI recs  -trend CMP  -MRI abdomen pending, possible MRCP 127, 128 on admission.  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

## 2024-08-17 NOTE — PROGRESS NOTE ADULT - PROBLEM SELECTOR PLAN 6
On amlodipine 5mg daily, Carvedilol 3.125mg daily    -c/w home meds with hold parameters 5 stents, 2011 x2, 2013 x1, 2014 x1, 2023x1.    -Will hold lipitor 40mg in setting of elevated LFTs  -Will continue with ASA 81 SOB on exertion.  Normal SpO2 on Room Air, No WOB, No SOB at rest.  CTA Chest Negative for PE    -TTE to evaluate LVEF

## 2024-08-17 NOTE — PROGRESS NOTE ADULT - TIME BILLING
review of labs, imaging, notes, discussion of plan with team, which are independent of time spent teaching

## 2024-08-17 NOTE — PROGRESS NOTE ADULT - PROBLEM SELECTOR PLAN 1
Biopsy proven in [facility] from endoscopy on 8/12/24  -Onc following, recs given  ------will require repeat diagnostic biopsy (likely IR guided biopsy of liver lesion or hepatic adenopathy); will require HER2, programmed death ligand 1 (PD-L1),and microsatellite testing/NGS.  ------HIV, hepatitis panel, H-pylori testing, f/u in AM   ------Pending MR pelvis  ------pending IR guided liver bx scheduled for 8/20  -GI following, recs given  -------RUQ US with doppler for elevated liver enzymes  -------Hepatitis panel  -------benefits of ASA in setting of CAD w/5 stents outweigh increased risk of GI bleeding Biopsy proven in [facility] from endoscopy on 8/12/24  -Onc following, recs given  ------will require repeat diagnostic biopsy (likely IR guided biopsy of liver lesion or hepatic adenopathy); will require HER2, programmed death ligand 1 (PD-L1),and microsatellite testing/NGS.  ------HIV, hepatitis panel, H-pylori testing, f/u in AM   ------Pending MR pelvis: Abdominal and CXR today to eval for any metallic endoscopic clips from [facility] endoscopy.  ------pending IR guided liver bx scheduled for 8/20  -GI following, recs given  -------RUQ US with doppler for elevated liver enzymes  -------Hepatitis panel  -------benefits of ASA in setting of CAD w/5 stents outweigh increased risk of GI bleeding Biopsy proven in [facility] from endoscopy on 8/12/24  -Onc following, recs given  ------will require repeat diagnostic biopsy (likely IR guided biopsy of liver lesion or hepatic adenopathy); will require HER2, programmed death ligand 1 (PD-L1),and microsatellite testing/NGS.  ------HIV, hepatitis panel, H-pylori testing, f/u in AM   ------Pending MR pelvis: Abdominal and CXR today to eval for any metallic endoscopic clips from [facility] endoscopy.  ------pending IR guided liver bx scheduled for 8/20  -GI following, recs given  -------RUQ US with doppler for elevated liver enzymes  -------Hepatitis panel negative  -------benefits of ASA in setting of CAD w/5 stents outweigh increased risk of GI bleeding, will hold due to pending IR procedure for liver mets biopsy. Biopsy proven in [facility] from endoscopy on 8/12/24  -Onc following, recs given  ------will require repeat diagnostic biopsy (likely IR guided biopsy of liver lesion or hepatic adenopathy); will require HER2, programmed death ligand 1 (PD-L1),and microsatellite testing/NGS.  ------HIV, H-pylori testing, f/u  ------Pending MR pelvis: Abdominal and CXR today to eval for any metallic endoscopic clips from [facility] endoscopy.  ------pending IR guided liver bx scheduled for 8/20  -GI following, recs given  -------RUQ US with doppler for elevated liver enzymes: shows heterogenous nodular liver w/mets  -------Hepatitis panel negative  -------benefits of ASA in setting of CAD w/5 stents outweigh increased risk of GI bleeding, will hold due to pending IR procedure for liver mets biopsy.

## 2024-08-17 NOTE — PROGRESS NOTE ADULT - ATTENDING COMMENTS
metastatic gastric cancer with mets to the liver   induration of L dorsum of hand r/o abscess but suspect likely old thrombophlebitis from prior IV  lactic acidosis likely 2/2 liver disease from mets and some component of hypovolemia  hyponatremia  - awaiting IR guided liver bx 8/10  - MRI abdomen  - onc and GI following  - maalox prn dyspepsia and bloating  - R hand u/s, warm compresses prn  - LR x 10 hrs, trend lactic acid  - trend na    d/w son and niece at bedside, all ques answered

## 2024-08-17 NOTE — PROGRESS NOTE ADULT - PROBLEM SELECTOR PLAN 9
DVT ppx: Lovenox: preferred in malignancy  Diet: Salt and cholesterol restricted, Halal  Code: Full Code On both Finasteride 5mg daily and Tamsulosin 0.4mg daily    -c/w home meds On lipitor 40mg    -Will hold in setting of elevated LFTs

## 2024-08-17 NOTE — PROGRESS NOTE ADULT - PROBLEM SELECTOR PLAN 8
On both Finasteride 5mg daily and Tamsulosin 0.4mg daily    -c/w home meds On lipitor 40mg    -Will hold in setting of elevated LFTs On amlodipine 5mg daily, Carvedilol 3.125mg daily    -c/w home meds with hold parameters

## 2024-08-17 NOTE — PROGRESS NOTE ADULT - PROBLEM SELECTOR PLAN 10
DVT ppx: Lovenox: preferred in malignancy  Diet: Salt and cholesterol restricted, Halal  Code: Full Code On both Finasteride 5mg daily and Tamsulosin 0.4mg daily    -c/w home meds

## 2024-08-17 NOTE — PROGRESS NOTE ADULT - PROBLEM SELECTOR PLAN 4
127, 128 on admission.  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 Reports frequent needle sticks in L hand during recent hospitalization in [facility].    -L hand U/S of soft tissue to eval for abscess.

## 2024-08-17 NOTE — PROGRESS NOTE ADULT - PROBLEM SELECTOR PLAN 2
SOB on exertion.  Normal SpO2 on Room Air, No WOB, No SOB at rest.  CTA Chest Negative for PE    -TTE to evaluate LVEF Lactate 4.3 on admission, likely 2/2 mets to liver, normotensive, normal Spo2  Pt with poor PO intake  -Will treat with 100mL/hr of IV fluids x 10hrs Lactate 4.3 on admission, likely 2/2 mets to liver, normotensive, normal Spo2  Pt with poor PO intake  - Will treat with 100mL/hr of IV fluids x 10hrs  - trend lactate

## 2024-08-17 NOTE — PROGRESS NOTE ADULT - PROBLEM SELECTOR PLAN 11
DVT ppx: Lovenox: preferred in malignancy  Diet: Salt and cholesterol restricted, Halal  Code: Full Code DVT ppx: Lovenox: preferred in malignancy  Diet: Salt and cholesterol restricted, Halal  Code: Full Code  Dispo: awaiting IR liver bx on 8/20

## 2024-08-17 NOTE — PROGRESS NOTE ADULT - PROBLEM SELECTOR PLAN 5
5 stents, 2011 x2, 2013 x1, 2014 x1, 2023x1.    -Will hold lipitor 40mg in setting of elevated LFTs  -Will continue with ASA 81 SOB on exertion.  Normal SpO2 on Room Air, No WOB, No SOB at rest.  CTA Chest Negative for PE    -TTE to evaluate LVEF Bilirubin Total: 3.0 <-1.5,   Direct bili 2.1, Indirect 0.9  Alkaline Phosphatase: 881 <-978  AST: 136 <- 158  ALT: 72 <- 71  Suspected to be due to metastatic infiltration from either singular or multiple mets    -GI and Onc following  -RUQ abd U/S w/ doppler as per GI recs  -trend CMP  -MRI abdomen pending, possible MRCP Bilirubin Total: 3.0 <-1.5,   Direct bili 2.1, Indirect 0.9  Alkaline Phosphatase: 881 <-978  AST: 136 <- 158  ALT: 72 <- 71  Suspected to be due to metastatic infiltration from either singular or multiple mets    -GI and Onc following  -trend CMP  -MRI abdomen pending, possible MRCP

## 2024-08-17 NOTE — PROGRESS NOTE ADULT - PROBLEM SELECTOR PLAN 7
On lipitor 40mg    -Will hold in setting of elevated LFTs On amlodipine 5mg daily, Carvedilol 3.125mg daily    -c/w home meds with hold parameters 5 stents, 2011 x2, 2013 x1, 2014 x1, 2023x1.    -Will hold lipitor 40mg in setting of elevated LFTs  -Will continue with ASA 81 5 stents, 2011 x2, 2013 x1, 2014 x1, 2023x1.    -Will hold lipitor 40mg in setting of elevated LFTs  -Will hold DAPT in anticipation of IR biopsy of liver mets.

## 2024-08-18 NOTE — PROGRESS NOTE ADULT - ASSESSMENT
74 y/o M, PMH CAD w/5 stents, HTN, HLD, recent Dx of gastric adenocarcinoma, [place] speaking, presents with burning epigastric pain associated with eating, shortness of breath on exertion x 2 weeks. He was evaluated in [facility] with endoscopy for the abdominal pain, constipation and black stools on 8/12, which showed a bleeding gastric ulcer, biopsy was taken and hemostasis achieved, Biopsy positive for gastric adenocarcinoma. CT imaging revealed metastasis to liver and adjacent lymph nodes. He elected to pursue further care here in the [place], and deferred any treatment in [facility]. Awaiting liver biopsy 8/20.

## 2024-08-18 NOTE — PROGRESS NOTE ADULT - PROBLEM SELECTOR PLAN 4
Small 3cm circular area of redness with associated induration and TTP over dorsum of L hand, no streaking, no spreading redness, no fluctuance, Sensorimotor function of L hand intact. Reports frequent needle sticks in L hand during recent hospitalization in [facility].    -L hand U/S of soft tissue to eval for abscess. 2/2 thrombophlebitis. Small 3cm circular area of redness with associated induration and TTP over dorsum of L hand, no streaking, no spreading redness, no fluctuance, Sensorimotor function of L hand intact. Reports frequent needle sticks in L hand during recent hospitalization in [facility].  -L hand U/S of soft tissue neg for abscess  -warm compresses prn

## 2024-08-18 NOTE — PROGRESS NOTE ADULT - PROBLEM SELECTOR PLAN 2
Lactate 4.3 on admission, likely 2/2 mets to liver, normotensive, normal Spo2  Pt with poor PO intake  - Will treat with 100mL/hr of IV fluids x 10hrs  - trend lactate Lactate 4.3 on admission, likely 2/2 poor clearance due to mets to liver, normotensive, normal Spo2  Pt with poor PO intake  s/p IVF  - monitor

## 2024-08-18 NOTE — PROGRESS NOTE ADULT - PROBLEM SELECTOR PLAN 7
5 stents, 2011 x2, 2013 x1, 2014 x1, 2023x1.    -Will hold lipitor 40mg in setting of elevated LFTs  -Will hold DAPT in anticipation of IR biopsy of liver mets.

## 2024-08-18 NOTE — PROGRESS NOTE ADULT - PROBLEM SELECTOR PLAN 3
127, 128 on admission.  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

## 2024-08-18 NOTE — PROGRESS NOTE ADULT - PROBLEM SELECTOR PLAN 5
Bilirubin Total: 3.0 <-1.5,   Direct bili 2.1, Indirect 0.9  Alkaline Phosphatase: 881 <-978  AST: 136 <- 158  ALT: 72 <- 71  Suspected to be due to metastatic infiltration from either singular or multiple mets    -GI and Onc following  -trend CMP  -MRI abdomen pending, possible MRCP Bilirubin Total: 3.0 <-1.5,   Direct bili 2.1, Indirect 0.9  Alkaline Phosphatase: 881 <-978  AST: 136 <- 158  ALT: 72 <- 71  Suspected to be due to metastatic infiltration from either singular or multiple mets    -GI and Onc following  -trend CMP  -MRI abdomen result pending possible MRCP

## 2024-08-18 NOTE — PROGRESS NOTE ADULT - SUBJECTIVE AND OBJECTIVE BOX
Patient is a 73y old  Male who presents with a chief complaint of Gastric adenocarcinoma (17 Aug 2024 10:55)      SUBJECTIVE / OVERNIGHT EVENTS: No acute events overnight. Patient seen and examined at bedside.    MEDICATIONS  (STANDING):  amLODIPine   Tablet 5 milliGRAM(s) Oral daily  enoxaparin Injectable 40 milliGRAM(s) SubCutaneous every 24 hours  finasteride 5 milliGRAM(s) Oral daily  lactated ringers. 1000 milliLiter(s) (100 mL/Hr) IV Continuous <Continuous>  levothyroxine 50 MICROGram(s) Oral daily  tamsulosin 0.4 milliGRAM(s) Oral at bedtime    MEDICATIONS  (PRN):  acetaminophen     Tablet .. 650 milliGRAM(s) Oral every 6 hours PRN Temp greater or equal to 38C (100.4F), Mild Pain (1 - 3)  aluminum hydroxide/magnesium hydroxide/simethicone Suspension 30 milliLiter(s) Oral every 4 hours PRN Dyspepsia  melatonin 3 milliGRAM(s) Oral at bedtime PRN Insomnia  ondansetron Injectable 4 milliGRAM(s) IV Push every 8 hours PRN Nausea and/or Vomiting      CAPILLARY BLOOD GLUCOSE        I&O's Summary      Vital Signs Last 24 Hrs  T(C): 36.7 (18 Aug 2024 04:02), Max: 36.9 (17 Aug 2024 21:24)  T(F): 98 (18 Aug 2024 04:02), Max: 98.4 (17 Aug 2024 21:24)  HR: 67 (18 Aug 2024 04:02) (58 - 69)  BP: 140/74 (18 Aug 2024 04:02) (138/79 - 147/77)  BP(mean): --  RR: 18 (18 Aug 2024 04:02) (18 - 18)  SpO2: 97% (18 Aug 2024 04:02) (96% - 97%)    Parameters below as of 18 Aug 2024 04:02  Patient On (Oxygen Delivery Method): room air        PHYSICAL EXAM:  GENERAL: NAD, well-developed, well-nourished  HEAD: Atraumatic, Normocephalic  EYES: EOMI, PERRLA, conjunctiva and sclera clear  NECK: Supple, No JVD  CHEST/LUNG: Clear to auscultation bilaterally; No wheezes or crackles  HEART: Normal S1/S2; Regular rate and rhythm; No murmurs, rubs, or gallops  ABDOMEN: Soft, Nontender, Nondistended; Bowel sounds present  EXTREMITIES: 2+ Peripheral Pulses; No clubbing, cyanosis, or edema  PSYCH: A&Ox3  NEUROLOGY: no focal neurologic deficit  SKIN: No rashes or lesions    LABS:                        12.7   11.12 )-----------( 216      ( 18 Aug 2024 06:46 )             38.0      08-17    128<L>  |  90<L>  |  16  ----------------------------<  73  4.2   |  22  |  0.77    Ca    9.2      17 Aug 2024 06:56  Phos  2.2     08-17  Mg     2.0     08-17    TPro  6.7  /  Alb  3.0<L>  /  TBili  3.0<H>  /  DBili  2.1<H>  /  AST  136<H>  /  ALT  72<H>  /  AlkPhos  881<H>  08-17          Urinalysis Basic - ( 17 Aug 2024 06:56 )    Color: x / Appearance: x / SG: x / pH: x  Gluc: 73 mg/dL / Ketone: x  / Bili: x / Urobili: x   Blood: x / Protein: x / Nitrite: x   Leuk Esterase: x / RBC: x / WBC x   Sq Epi: x / Non Sq Epi: x / Bacteria: x        RADIOLOGY & ADDITIONAL TESTS:    Imaging Personally Reviewed:    Consultant(s) Notes Reviewed:      Care Discussed with Consultants/Other Providers:   Patient is a 73y old  Male who presents with a chief complaint of Gastric adenocarcinoma (17 Aug 2024 10:55)      SUBJECTIVE / OVERNIGHT EVENTS: No acute events overnight. Patient seen and examined at bedside w/ son. Some bloating but otherwise no complaints.    MEDICATIONS  (STANDING):  amLODIPine   Tablet 5 milliGRAM(s) Oral daily  enoxaparin Injectable 40 milliGRAM(s) SubCutaneous every 24 hours  finasteride 5 milliGRAM(s) Oral daily  lactated ringers. 1000 milliLiter(s) (100 mL/Hr) IV Continuous <Continuous>  levothyroxine 50 MICROGram(s) Oral daily  tamsulosin 0.4 milliGRAM(s) Oral at bedtime    MEDICATIONS  (PRN):  acetaminophen     Tablet .. 650 milliGRAM(s) Oral every 6 hours PRN Temp greater or equal to 38C (100.4F), Mild Pain (1 - 3)  aluminum hydroxide/magnesium hydroxide/simethicone Suspension 30 milliLiter(s) Oral every 4 hours PRN Dyspepsia  melatonin 3 milliGRAM(s) Oral at bedtime PRN Insomnia  ondansetron Injectable 4 milliGRAM(s) IV Push every 8 hours PRN Nausea and/or Vomiting      CAPILLARY BLOOD GLUCOSE        I&O's Summary      Vital Signs Last 24 Hrs  T(C): 36.7 (18 Aug 2024 04:02), Max: 36.9 (17 Aug 2024 21:24)  T(F): 98 (18 Aug 2024 04:02), Max: 98.4 (17 Aug 2024 21:24)  HR: 67 (18 Aug 2024 04:02) (58 - 69)  BP: 140/74 (18 Aug 2024 04:02) (138/79 - 147/77)  BP(mean): --  RR: 18 (18 Aug 2024 04:02) (18 - 18)  SpO2: 97% (18 Aug 2024 04:02) (96% - 97%)    Parameters below as of 18 Aug 2024 04:02  Patient On (Oxygen Delivery Method): room air        PHYSICAL EXAM:  GENERAL: NAD, well-developed, well-nourished  HEAD: Atraumatic, Normocephalic  EYES: EOMI, PERRLA, conjunctiva and sclera clear  NECK: Supple, No JVD  CHEST/LUNG: Clear to auscultation bilaterally; No wheezes or crackles  HEART: Normal S1/S2; Regular rate and rhythm; No murmurs, rubs, or gallops  ABDOMEN: Soft, Nontender, Nondistended; Bowel sounds present  EXTREMITIES: 2+ Peripheral Pulses; No clubbing, cyanosis, or edema  PSYCH: A&Ox3  NEUROLOGY: no focal neurologic deficit  SKIN: No rashes or lesions    LABS:                        12.7   11.12 )-----------( 216      ( 18 Aug 2024 06:46 )             38.0      08-17    128<L>  |  90<L>  |  16  ----------------------------<  73  4.2   |  22  |  0.77    Ca    9.2      17 Aug 2024 06:56  Phos  2.2     08-17  Mg     2.0     08-17    TPro  6.7  /  Alb  3.0<L>  /  TBili  3.0<H>  /  DBili  2.1<H>  /  AST  136<H>  /  ALT  72<H>  /  AlkPhos  881<H>  08-17          Urinalysis Basic - ( 17 Aug 2024 06:56 )    Color: x / Appearance: x / SG: x / pH: x  Gluc: 73 mg/dL / Ketone: x  / Bili: x / Urobili: x   Blood: x / Protein: x / Nitrite: x   Leuk Esterase: x / RBC: x / WBC x   Sq Epi: x / Non Sq Epi: x / Bacteria: x        RADIOLOGY & ADDITIONAL TESTS:    Imaging Personally Reviewed:    Consultant(s) Notes Reviewed:      Care Discussed with Consultants/Other Providers:

## 2024-08-18 NOTE — PROGRESS NOTE ADULT - TIME BILLING
- Reviewing, and interpreting labs and testing.  - Independently obtaining a review of systems and performing a physical exam  - Reviewing consultant documentation/recommendations   - Counselling and educating patient and family regarding interpretation of aforementioned items and plan of care. review of labs, imaging, notes, discussion of plan with team, which are independent of time spent teaching

## 2024-08-18 NOTE — PROGRESS NOTE ADULT - PROBLEM SELECTOR PLAN 6
SOB on exertion.  Normal SpO2 on Room Air, No WOB, No SOB at rest.  CTA Chest Negative for PE    -TTE to evaluate LVEF

## 2024-08-18 NOTE — PROGRESS NOTE ADULT - PROBLEM SELECTOR PLAN 1
Biopsy proven in [facility] from endoscopy on 8/12/24  -Onc following, recs given  ------will require repeat diagnostic biopsy (likely IR guided biopsy of liver lesion or hepatic adenopathy); will require HER2, programmed death ligand 1 (PD-L1),and microsatellite testing/NGS.  ------HIV, H-pylori testing, f/u  ------Pending MR pelvis: Abdominal and CXR today to eval for any metallic endoscopic clips from [facility] endoscopy.  ------pending IR guided liver bx scheduled for 8/20  -GI following, recs given  -------RUQ US with doppler for elevated liver enzymes: shows heterogenous nodular liver w/mets  -------Hepatitis panel negative  -------benefits of ASA in setting of CAD w/5 stents outweigh increased risk of GI bleeding, will hold due to pending IR procedure for liver mets biopsy. Biopsy proven in [facility] from endoscopy on 8/12/24  -Onc following, recs given  ------will require repeat diagnostic biopsy (likely IR guided biopsy of liver lesion or hepatic adenopathy); will require HER2, programmed death ligand 1 (PD-L1),and microsatellite testing/NGS.  ------HIV, H-pylori testing, f/u  ------Pending MR pelvis result  ------pending IR guided liver bx scheduled for 8/20  -GI following, recs given  -------RUQ US with doppler for elevated liver enzymes: shows heterogenous nodular liver w/mets  -------Hepatitis panel negative  -------benefits of ASA in setting of CAD w/5 stents outweigh increased risk of GI bleeding, will hold due to pending IR procedure for liver mets biopsy.

## 2024-08-18 NOTE — PROGRESS NOTE ADULT - PROBLEM SELECTOR PLAN 11
DVT ppx: Lovenox: preferred in malignancy  Diet: Salt and cholesterol restricted, Halal  Code: Full Code  Dispo: awaiting IR liver bx on 8/20 DVT ppx: Lovenox: preferred in malignancy  Diet: Salt and cholesterol restricted, Halal, 1.5L fluid restriction  Code: Full Code  Dispo: awaiting IR liver bx on 8/20

## 2024-08-18 NOTE — PROGRESS NOTE ADULT - ATTENDING COMMENTS
metastatic gastric cancer with mets to the liver   induration of L dorsum of hand thrombophlebitis, u/s reviewed neg for abscess  lactic acidosis likely 2/2 liver disease from mets and some component of hypovolemia  hyponatremia 2/2 SIADH  - awaiting IR guided liver bx 8/10  - MRI abdomen done - f/u result  - onc and GI following  - trend na, fluid restrict to 1.5L/day    d/w son at bedside, all ques answered

## 2024-08-19 NOTE — PROGRESS NOTE ADULT - SUBJECTIVE AND OBJECTIVE BOX
Oncology Follow-up    INTERVAL HPI/OVERNIGHT EVENTS:  Patient seen and evaluated at bedside, accompanied by son. Reports to have ongoing abdominal pain.     VITAL SIGNS:  T(F): 98.3 (08-19-24 @ 12:48)  HR: 96 (08-19-24 @ 12:48)  BP: 141/77 (08-19-24 @ 12:48)  RR: 18 (08-19-24 @ 12:48)  SpO2: 99% (08-19-24 @ 12:48)  Wt(kg): --      PHYSICAL EXAM   GENERAL: thin  HEAD:  Atraumatic, Normocephalic  EYES: EOMI, conjunctiva and sclera clear  CHEST/LUNG: Clear to auscultation bilaterally  HEART: Regular rate and rhythm  ABDOMEN: Soft, Nontender, Nondistended  EXTREMITIES: No clubbing, cyanosis, or edema  NEUROLOGY: non-focal  SKIN: No rashes or lesions    MEDICATIONS  (STANDING):  amLODIPine   Tablet 5 milliGRAM(s) Oral daily  finasteride 5 milliGRAM(s) Oral daily  levothyroxine 50 MICROGram(s) Oral daily  polyethylene glycol 3350 17 Gram(s) Oral daily  senna 2 Tablet(s) Oral at bedtime  tamsulosin 0.4 milliGRAM(s) Oral at bedtime    MEDICATIONS  (PRN):  acetaminophen     Tablet .. 650 milliGRAM(s) Oral every 6 hours PRN Temp greater or equal to 38C (100.4F), Mild Pain (1 - 3)  aluminum hydroxide/magnesium hydroxide/simethicone Suspension 30 milliLiter(s) Oral every 4 hours PRN Dyspepsia  melatonin 3 milliGRAM(s) Oral at bedtime PRN Insomnia  ondansetron Injectable 4 milliGRAM(s) IV Push every 8 hours PRN Nausea and/or Vomiting  oxyCODONE    IR 10 milliGRAM(s) Oral every 4 hours PRN Moderate Pain (4 - 6)  simethicone 80 milliGRAM(s) Chew every 8 hours PRN Heartburn      No Known Allergies      LABS:                        12.7   11.12 )-----------( 216      ( 18 Aug 2024 06:46 )             38.0     08-19    128<L>  |  90<L>  |  19  ----------------------------<  92  4.5   |  20<L>  |  0.85    Ca    9.4      19 Aug 2024 07:09  Phos  2.5     08-19  Mg     2.3     08-19    TPro  6.7  /  Alb  3.0<L>  /  TBili  4.2<H>  /  DBili  x   /  AST  161<H>  /  ALT  91<H>  /  AlkPhos  785<H>  08-19       Urinalysis Basic - ( 19 Aug 2024 07:09 )    Color: x / Appearance: x / SG: x / pH: x  Gluc: 92 mg/dL / Ketone: x  / Bili: x / Urobili: x   Blood: x / Protein: x / Nitrite: x   Leuk Esterase: x / RBC: x / WBC x   Sq Epi: x / Non Sq Epi: x / Bacteria: x        RADIOLOGY & ADDITIONAL TESTS:  Studies reviewed.

## 2024-08-19 NOTE — CONSULT NOTE ADULT - PROBLEM SELECTOR RECOMMENDATION 9
plan for liver biopsy  defer to heme/onc for further management  goal of family and pt are to pursue treatment options offered

## 2024-08-19 NOTE — PROGRESS NOTE ADULT - PROBLEM SELECTOR PLAN 4
2/2 thrombophlebitis. Small 3cm circular area of redness with associated induration and TTP over dorsum of L hand, no streaking, no spreading redness, no fluctuance, Sensorimotor function of L hand intact. Reports frequent needle sticks in L hand during recent hospitalization in [facility].  -L hand U/S of soft tissue neg for abscess  -warm compresses prn

## 2024-08-19 NOTE — PROGRESS NOTE ADULT - ATTENDING COMMENTS
Patient seen and evaluted at bedside. +RUQ pain. No chest pain or SOB      PE:  GEN: NAD  CARDS; +S1S2 rrr No m/r/g  RESP; CTA b/l. No r/r/w  ABD: +RUQ TTP. +BS  EXT: No c/c/e  Labs with worsening bilirubin        #Metastatic gastric cancer  [facility]  -need additional staining to start therapy.  -for IR guided bx 8/20  -given risibng bili, will discuss with oncology re: inpt treatment available.    #transaminitis and hyperbilirubinemai, worsening  -MRI with left sided ductal dilation in addition to overal intrahepatic duct disruption by innumerable tumors.   -will ask advance GI to evaluate for stent. As bilirubin is rising, may preclude chemotherapy.     #Hx cAD  -TTE pending  -ASA when cleared by IR     #hypothyroidism'  -TSH in AM, continue with synthroid    #Advanced care planning  -patient wants son to be main decision maker. They want patient to be mentally "strong". Discussed palliative and , they agree to both. Patient seen and evaluted at bedside. +RUQ pain. No chest pain or SOB      PE:  GEN: NAD  CARDS; +S1S2 rrr No m/r/g  RESP; CTA b/l. No r/r/w  ABD: +RUQ TTP. +BS  EXT: No c/c/e  Labs with worsening bilirubin        #Metastatic gastric cancer  -biopsy proven in [facility]  -need additional staining to start therapy.  -for IR guided bx 8/20  -given risibng bili, will discuss with oncology re: inpt treatment available.    #transaminitis and hyperbilirubinemai, worsening  -MRI with left sided ductal dilation in addition to overal intrahepatic duct disruption by innumerable tumors.   -will ask advance GI to evaluate for stent. As bilirubin is rising, may preclude chemotherapy.     #Hx cAD  -TTE pending  -ASA when cleared by IR     #hypothyroidism'  -TSH in AM, continue with synthroid    #neoplastic pain  -add oxycodone q4hr, bowel regimen    #Advanced care planning  -patient wants son to be main decision maker. They want patient to be mentally "strong". Discussed palliative and , they agree to both.    Prognosis guarded, timing pending rise of bilirubin and abilityt o tolerate any chemotherapy.

## 2024-08-19 NOTE — PROGRESS NOTE ADULT - PROBLEM SELECTOR PLAN 11
DVT ppx: Lovenox: preferred in malignancy  Diet: Salt and cholesterol restricted, Halal, 1.5L fluid restriction  Code: Full Code  Dispo: awaiting IR liver bx on 8/20

## 2024-08-19 NOTE — CONSULT NOTE ADULT - PROBLEM SELECTOR RECOMMENDATION 5
will continue to follow  appreciate jasiel f/u  Can be reached by TEAMS M-F 9-5 Clarisa Mao Any other time please page [phone] if needed
28-Mar-2017 09:01

## 2024-08-19 NOTE — PROGRESS NOTE ADULT - ASSESSMENT
73 year old male, PMH HTN, HLD, CAD w/ stents, presenting with SOB, recent diagnosis of stomach adenocarcinoma in [facility] Oncology consulted for further recommendations    #Newly diagnosed metastatic gastric adenocarcinoma in [facility]  #Transaminitis likely 2/2 to infiltrative disease vs stricture  - Patient recently visiting family in [facility], had full workup performed there for epigastric pain and melena, including egd with pathology showing adenocarcinoma and imaging showing mets to liver and surround adenopathy.. Patient complains of shortness of breath for the past week worse with exertion and for the past 2 days complaining of nausea and epigastric pain with the eating with subjective 5kg weight loss over past few days. In the ED, Hgb 12.8, Bili 1.5, Alk Phos 978, , ALT 71, lactate 5.4. Reports last screening colonoscopy was 2011 and 2018. No prior endoscopy apart from this past EGD in [facility] Family brought reports from [facility] at beside   - CTA negative for PE or pulmonary mets, show mass within the wall of the lesser curvature of the gastric body   representing the patient's gastric malignancy, many hepatics mets and gastrohepatic lymphadenopathy.   - Appreciate GI eval: agree will require repeat diagnostic biopsy (likely IR guided biopsy of liver lesion or hepatic adenopathy); will require HER2, programmed death ligand 1 (PD-L1),and microsatellite testing/NGS.  - MR abd/pelvis: Innumerable large liver metastasis are noted, causing significant compression and distortion of the intrahepatic bile ducts; the hilar region of the bowel demonstrates somewhat narrowed and there is associated left sided intrahepatic biliary dilatation with a left hepatic duct measuring up to 5 mm.The common hepatic duct and the common bile duct are within normal limits and measures up to 4 mm. No evidence for choledocholithiasis or a CBD stricture.No evidence for cholelithiasis or cholecystitis.Known gastric mass present.  - Given uptrending bilirubin levels and MR findings, recommend advanced GI eval  - Pending IR guided liver biopsy 8/20; will email to expedite results   -Initially, inpatient palliative chemotherapy was considered as a treatment option. However, given the current clinical scenario, including the extensive metastatic disease, the rising bilirubin levels, and the patient's overall declining functional status, we now have concerns that chemotherapy may not be beneficial. In fact, administering chemotherapy at this stage could potentially exacerbate the patient's condition and negatively impact their quality of life. To be discussed with family if condition worsens/no improvement.   - Adjusted pain regimen, oxycontin 10 mg q4 prn   - Please add bowel regimen prn  - HIV, hepatitis panel negative; pending H-pylori testing results   - Advised on physical therapy and nutrition optimization   - Oncology team to follow  - Plan discussed with son and patient at bedside.     Note not finalized until signed by attending.   Please do not hesitate to page with questions.     Luh Masters MD  Hematology/Oncology Fellow PGY5  Available on [employer] Teams   Pager: [phone]  For weekends and evenings (5 pm - 8 am), please page fellow on call.

## 2024-08-19 NOTE — PROGRESS NOTE ADULT - PROBLEM SELECTOR PLAN 1
Biopsy proven in [facility] from endoscopy on 8/12/24  -Onc following, recs given  ------will require repeat diagnostic biopsy (likely IR guided biopsy of liver lesion or hepatic adenopathy); will require HER2, programmed death ligand 1 (PD-L1),and microsatellite testing/NGS.  ------HIV, H-pylori testing, f/u  ------Pending MR pelvis result  ------pending IR guided liver bx scheduled for 8/20  -GI following, recs given  -------RUQ US with doppler for elevated liver enzymes: shows heterogenous nodular liver w/mets  -------Hepatitis panel negative  -------benefits of ASA in setting of CAD w/5 stents outweigh increased risk of GI bleeding, will hold due to pending IR procedure for liver mets biopsy.

## 2024-08-19 NOTE — CONSULT NOTE ADULT - ASSESSMENT
72 y/o M, PMH CAD w/5 stents, HTN, HLD, recent Dx of gastric adenocarcinoma, Italian speaking, presents with burning epigastric pain associated with eating, shortness of breath on exertion x 2 weeks. He was evaluated in [facility] with endoscopy for the abdominal pain, constipation and black stools on 8/12, which showed a bleeding gastric ulcer, biopsy was taken and hemostasis achieved, Biopsy positive for gastric adenocarcinoma. CT imaging revealed metastasis to liver and adjacent lymph nodes. He elected to pursue further care here in the [place], and deferred any treatment in [facility]. Awaiting liver biopsy 8/20. (Taken from internal medicine note). Palliative consulted for GOC.

## 2024-08-19 NOTE — CONSULT NOTE ADULT - CONVERSATION DETAILS
Spoke with Nisha outside of pt's room. Nisha shared pt is having difficulty with dx. Shared concern in the setting of metastatic cancer. Discussed the overall high functional status required for many cancer treatments and concern regarding deblity. Family and pt remain hopeful to have the biopsy and continue ongoing treatments that are offered. Discussed if medical wishes have been discussed, to which Nisha has not addressed with his father, but their goal is to remain hopeful regarding treatment options with the goal of prolonging life. At this time pt is full code, but pt and family amenable for ongoing goc conversations.

## 2024-08-19 NOTE — CONSULT NOTE ADULT - SUBJECTIVE AND OBJECTIVE BOX
Date of Service: 08-19-24 @ 16:29    HPI: 72 y/o M, PMH CAD w/5 stents, HTN, HLD, recent Dx of gastric adenocarcinoma, Romansh speaking, presents with burning epigastric pain associated with eating, shortness of breath on exertion x 2 weeks. He was evaluated in [facility] with endoscopy for the abdominal pain, constipation and black stools on 8/12, which showed a bleeding gastric ulcer, biopsy was taken and hemostasis achieved, Biopsy positive for gastric adenocarcinoma. CT imaging revealed metastasis to liver and adjacent lymph nodes. He elected to pursue further care here in the [place], and deferred any treatment in [facility]. Awaiting liver biopsy 8/20. (Taken from internal medicine note). Palliative consulted for [facility].     Romansh : Ekta ID# [id number]      PERTINENT PM/SXH:   Hyperlipidemia    Stented coronary artery    History of BPH    Hypertension    CAD (coronary artery disease)    Hypothyroid    Stented coronary artery      No significant past surgical history      FAMILY HISTORY:  No pertinent family history in first degree relatives        ITEMS NOT CHECKED ARE NOT PRESENT    SOCIAL HISTORY:   Significant other/partner[x ]  Children[ x]  Congregation/Spirituality: [employer]   Substance hx:  [ ]   Tobacco hx:  [ ]   Alcohol hx: [ ]   Home Opioid hx:  [ ] I-Stop Reference No:  Living Situation: [x ]Home  [ ]Long term care  [ ]Rehab [ ]Other    ADVANCE DIRECTIVES:    DNR/MOLST  [ ]  Living Will  [ ]   DECISION MAKER(s):  [ ] Health Care Proxy(s)  [x ] Surrogate(s)  [ ] Guardian           Name(s): Phone Number(s): pt defers to son Nisha      BASELINE (I)ADL(s) (prior to admission):  [place]oma: [ ]Total  [ x] Moderate [ ]Dependent    Allergies    No Known Allergies    Intolerances    MEDICATIONS  (STANDING):  amLODIPine   Tablet 5 milliGRAM(s) Oral daily  finasteride 5 milliGRAM(s) Oral daily  levothyroxine 50 MICROGram(s) Oral daily  polyethylene glycol 3350 17 Gram(s) Oral daily  senna 2 Tablet(s) Oral at bedtime  tamsulosin 0.4 milliGRAM(s) Oral at bedtime    MEDICATIONS  (PRN):  acetaminophen     Tablet .. 650 milliGRAM(s) Oral every 6 hours PRN Temp greater or equal to 38C (100.4F), Mild Pain (1 - 3)  aluminum hydroxide/magnesium hydroxide/simethicone Suspension 30 milliLiter(s) Oral every 4 hours PRN Dyspepsia  melatonin 3 milliGRAM(s) Oral at bedtime PRN Insomnia  ondansetron Injectable 4 milliGRAM(s) IV Push every 8 hours PRN Nausea and/or Vomiting  oxyCODONE    IR 10 milliGRAM(s) Oral every 4 hours PRN Moderate Pain (4 - 6)  simethicone 80 milliGRAM(s) Chew every 8 hours PRN Heartburn    PRESENT SYMPTOMS: [ ]Unable to self-report see CPOT, PAINADs, RDOS  Source if other than patient:  [ ]Family   [ ]Team     Pain: [ ]yes [x ]no- none on exam, when pain is present RUQ pain   QOL impact -   Location -                    Aggravating factors -  Quality -  Radiation -  Timing-  Severity (0-10 scale):  Minimal acceptable level (0-10 scale):       Dyspnea:                           [ ]Mild [ ]Moderate [ ]Severe  Anxiety:                             [ ]Mild [ ]Moderate [ ]Severe  Fatigue:                             [ ]Mild [ ]Moderate [ ]Severe  Nausea:                             [ ]Mild [ ]Moderate [ ]Severe  Loss of appetite:              [ ]Mild [ ]Moderate [ ]Severe  Constipation:                    [ ]Mild [ ]Moderate [ ]Severe    PCSSQ [Palliative Care Spiritual Screening Question]   Severity (0-10):  Score of 4 or > indicate consideration of Chaplaincy referral.  Chaplaincy Referral: [x ] yes [ ] refused [ x] following    Caregiver [place]? : [ ] yes [ ] no [ ] deferred:  Social work referral [ ] Patient & Family Centered Care Referral [ ]     Anticipatory Grief Present?: [ ] yes [ ] no  [ ] deferred: Palliative Social work referral [ ]  Patient & Family Centered Care Referral [ ]       Other Symptoms:  [x ]All other review of systems negative   [ ] Unable to obtain due to poor mentation    PHYSICAL EXAM:  Vital Signs Last 24 Hrs  T(C): 36.8 (19 Aug 2024 12:48), Max: 36.8 (19 Aug 2024 04:02)  T(F): 98.3 (19 Aug 2024 12:48), Max: 98.3 (19 Aug 2024 12:48)  HR: 96 (19 Aug 2024 12:48) (73 - 96)  BP: 141/77 (19 Aug 2024 12:48) (126/80 - 141/77)  BP(mean): --  RR: 18 (19 Aug 2024 12:48) (18 - 18)  SpO2: 99% (19 Aug 2024 12:48) (99% - 99%)    Parameters below as of 19 Aug 2024 12:48  Patient On (Oxygen Delivery Method): room air     I&O's Summary    18 Aug 2024 07:01  -  19 Aug 2024 07:00  --------------------------------------------------------  IN: 240 mL / OUT: 0 mL / NET: 240 mL        GENERAL:  [x]Alert  [x]Oriented x 3  [ ]Lethargic  [ ]Cachexia  [ ]Unarousable  [x]Verbal  [ ]Non-Verbal  Behavioral:   [ ]Anxiety  [ ]Delirium [ ]Agitation [ ]Other  HEENT:  [x]Normal   [ ]Dry mouth   [ ]ET Tube/Trach  [ ]Oral lesions  PULMONARY:   [ ]Clear [ ]Tachypnea  [ ]Audible excessive secretions   [ ]Rhonchi        [ ]Right [ ]Left [ ]Bilateral  [ ]Crackles        [ ]Right [ ]Left [ ]Bilateral  [ ]Wheezing     [ ]Right [ ]Left [ ]Bilateral  [ x]Diminished BS [ ] Right [ ]Left [ ]Bilateral  CARDIOVASCULAR:    [ ]Regular [ ]Irregular [ ]Tachy  [ ]Galdino [ ]Murmur [ ]Other  GASTROINTESTINAL:  [ ]Soft  [x ]Distended   [x]+BS  [ ]Non tender [x ]Tender  [ ]PEG [ ]OGT/ NGT   Last BM:    GENITOURINARY:  [ ]Normal [ ]Incontinent   [ ]Oliguria/Anuria   [ ]Morales  MUSCULOSKELETAL:   [ ]Normal   [x]Weakness  [ ]Bed/Wheelchair bound [ ]Edema  NEUROLOGIC:   [x]No focal deficits  [ ] Cognitive impairment  [ ] Dysphagia [ ]Dysarthria [ ] Paresis [ ]Other   SKIN:   [x]Normal  [ ]Rash   [ ]Pressure ulcer(s) [ ]y [ ]n present on admission    CRITICAL CARE:  [ ] Shock Present  [ ]Septic [ ]Cardiogenic [ ]Neurologic [ ]Hypovolemic  [ ]  Vasopressors [ ]  Inotropes   [ ]Respiratory failure present [ ]Mechanical ventilation [ ]Non-invasive ventilatory support [ ]High flow    [ ]Acute  [ ]Chronic [ ]Hypoxic  [ ]Hypercarbic [ ]Other  [ ]Other organ failure     LABS:                        12.7   11.12 )-----------( 216      ( 18 Aug 2024 06:46 )             38.0   08-19    128<L>  |  90<L>  |  19  ----------------------------<  92  4.5   |  20<L>  |  0.85    Ca    9.4      19 Aug 2024 07:09  Phos  2.5     08-19  Mg     2.3     08-19    TPro  6.7  /  Alb  3.0<L>  /  TBili  4.2<H>  /  DBili  x   /  AST  161<H>  /  ALT  91<H>  /  AlkPhos  785<H>  08-19      Urinalysis Basic - ( 19 Aug 2024 07:09 )    Color: x / Appearance: x / SG: x / pH: x  Gluc: 92 mg/dL / Ketone: x  / Bili: x / Urobili: x   Blood: x / Protein: x / Nitrite: x   Leuk Esterase: x / RBC: x / WBC x   Sq Epi: x / Non Sq Epi: x / Bacteria: x      RADIOLOGY & ADDITIONAL STUDIES:  < from: MR Abdomen w/ IV Cont (08.18.24 @ 11:29) >    ACC: [id number] EXAM:  MR ABDOMEN IC   ORDERED BY: DADA TURNER     PROCEDURE DATE:  08/18/2024          INTERPRETATION:  CLINICAL INFORMATION: Metastatic gastric cancer with   liver metastasis, bilirubin 1.5, rule out dominant stricture    COMPARISON: CT from 8/16/2024.    CONTRAST/COMPLICATIONS:  IV Contrast: Gadavist  7 cc administered   .5 cc discarded  Oral Contrast: NONE  Complications: None reported at time of study completion    PROCEDURE:  MRI of the abdomen was performed.  MRCP was performed.    FINDINGS:  LOWER CHEST: Within normal limits.    LIVER: Innumerable liver lesions are visible throughout the liver which   is similar to the recent prior CT and consistent with known metastasis.   The larger lesions in the right liver isapproximately 5 cm on image 1 of   series 11. There is mild compression and displacement of the hepatic   veins however the liver vasculature is patent..  BILE DUCTS: The MRCP images demonstrate significant compression and   distortion of the intrahepatic bile ducts, best seen on images 7 through   9 of series 12 with pronounced compression of the hilar region of the   bile ducts on image 8 of series 12 and associated mild left-sided   intrahepatic ductal dilatation, also best seen on image 8 of series 12.   The left hepatic duct is dilated to approximately 5 mm on image 34 of   series 29. The common hepatic duct and the common bile duct are within   normal limits measuring approximately 4 mm, best seen on image 41 of   series 29. No filling defects or strictures visible within the CBD.  GALLBLADDER: Mildly contracted gallbladder is visible. No evidence for   cholelithiasis or cholecystitis..  SPLEEN: Within normal limits.  PANCREAS: Mildly prominent and somewhat edematous pancreas is visible on   the T2-weighted sequences. Mild soft tissue stranding around the pancreas   and small amount of free fluid/ascites in the upper abdomen; these   findings are nonspecific however mild pancreatitis cannot be excluded. No   pancreatic ductal dilatation visible..  ADRENALS: Within normal limits.  KIDNEYS/URETERS: Small bilateral renal cysts. No evidence for   hydronephrosis.    VISUALIZED PORTIONS:  BOWEL: A gastric mass is visible on image 5 of series 11, approximately   measuring 2.7 cm..  PERITONEUM: Small amount of ascites is visible in the upper abdomen,   mainly around the liver and in the left paracolic gutter..  VESSELS: Within normal limits.  RETROPERITONEUM/LYMPH NODES: No lymphadenopathy.  ABDOMINAL WALL: Within normal limits.  BONES: Within normal limits.    IMPRESSION:  Innumerable large liver metastasis are noted, causing significant   compression and distortion of the intrahepatic bile ducts; the hilar   region of the bowel demonstrates somewhat narrowed and there is   associated left sided intrahepatic biliary dilatation with a left hepatic   duct measuring up to 5 mm.    The common hepatic duct and the common bile duct are within normal limits   and measures up to 4 mm. No evidence for choledocholithiasis or a CBD   stricture.    No evidence for cholelithiasis or cholecystitis.    Known gastric mass present.    Mildly prominent and somewhat edematous pancreas is visible with mild   soft tissue stranding around the pancreas and small amount of ascites;   thesefindings are nonspecific however mild acute pancreatitis cannot be   entirely excluded.    --- End of Report ---            ISAI MCCRACKEN MD; Attending Radiologist  This document has been electronically signed. Aug 18 2024  2:21PM    < end of copied text >    PROTEIN CALORIE MALNUTRITION PRESENT: [ ]mild [ ]moderate [ ]severe [ ]underweight [ ]morbid obesity  https://www.andeal.org/vault/2440/web/files/ONC/Table_Clinical%20Characteristics%20to%20Document%20Malnutrition-White%20JV%20et%20al%202012.pdf    Height (cm): 165.1 (08-16-24 @ 17:26)  Weight (kg): 68 (08-16-24 @ 17:26)  BMI (kg/m2): 24.9 (08-16-24 @ 17:26)    [ x]PPSV2 < or = to 30% [ ]significant weight loss  [ ]poor nutritional intake  [ ]anasarca[ ]Artificial Nutrition      Other REFERRALS:  [ ]Hospice  [ ]Child Life  [ ]Social Work  [ ]Case management [ ]Holistic Therapy     Care Coordination Assessment 201 [C. Provider] (08-17-24 @ 15:47)      Palliative Performance Scale:  http://npcrc.org/files/news/palliative_performance_scale_ppsv2.pdf  (Ctrl +  left click to view)  Respiratory Distress Observation Tool:  https://homecareinformation.net/handouts/hen/Respiratory_Distress_Observation_Scale.pdf (Ctrl +  left click to view)  PAINAD Score:  http://geriatrictoolkit.[place].[facility]/cog/painad.pdf (Ctrl +  left click to view)

## 2024-08-19 NOTE — PROGRESS NOTE ADULT - ASSESSMENT
74 y/o M, PMH CAD w/5 stents, HTN, HLD, recent Dx of gastric adenocarcinoma, German speaking, presents with burning epigastric pain associated with eating, shortness of breath on exertion x 2 weeks. He was evaluated in [facility] with endoscopy for the abdominal pain, constipation and black stools on 8/12, which showed a bleeding gastric ulcer, biopsy was taken and hemostasis achieved, Biopsy positive for gastric adenocarcinoma. CT imaging revealed metastasis to liver and adjacent lymph nodes. He elected to pursue further care here in the [place], and deferred any treatment in [facility]. Awaiting liver biopsy 8/20.

## 2024-08-19 NOTE — PROGRESS NOTE ADULT - PROBLEM SELECTOR PLAN 3
127, 128 on admission.  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

## 2024-08-19 NOTE — PROGRESS NOTE ADULT - PROBLEM SELECTOR PLAN 5
Bilirubin Total: 3.0 <-1.5,   Direct bili 2.1, Indirect 0.9  Alkaline Phosphatase: 881 <-978  AST: 136 <- 158  ALT: 72 <- 71  Suspected to be due to metastatic infiltration from either singular or multiple mets    -GI and Onc following  -trend CMP  -MRI abdomen result pending possible MRCP

## 2024-08-19 NOTE — PROGRESS NOTE ADULT - SUBJECTIVE AND OBJECTIVE BOX
DATE OF SERVICE: 08-19-24 @ 07:13  --------------------------------------------------------------  Tony Roche MD  PGY-3, Internal Medicine  Microsoft Teams preferred  Pager #: [phone], [facility] [phone]  After 7 PM: Night Float Pager  --------------------------------------------------------------      Patient is a 73y old  Male who presents with a chief complaint of Gastric adenocarcinoma (18 Aug 2024 07:17)      SUBJECTIVE / OVERNIGHT EVENTS:  No acute events overnight, patient reports feeling well and all questions answered at this time.     Additional Review of Systems:  Denies any other acute sx including f/c, HA, cough, sore throat, eye/ear changes, rhinorrhea, CP, palpitations, SOB, n/v, abdominal pain, back pain, bowel/bladder changes, fatigue, numbness or tingling.    MEDICATIONS  (STANDING):  amLODIPine   Tablet 5 milliGRAM(s) Oral daily  enoxaparin Injectable 40 milliGRAM(s) SubCutaneous every 24 hours  finasteride 5 milliGRAM(s) Oral daily  levothyroxine 50 MICROGram(s) Oral daily  polyethylene glycol 3350 17 Gram(s) Oral daily  potassium phosphate / sodium phosphate Powder (PHOS-NaK) 1 Packet(s) Oral once  senna 2 Tablet(s) Oral at bedtime  tamsulosin 0.4 milliGRAM(s) Oral at bedtime    MEDICATIONS  (PRN):  acetaminophen     Tablet .. 650 milliGRAM(s) Oral every 6 hours PRN Temp greater or equal to 38C (100.4F), Mild Pain (1 - 3)  aluminum hydroxide/magnesium hydroxide/simethicone Suspension 30 milliLiter(s) Oral every 4 hours PRN Dyspepsia  melatonin 3 milliGRAM(s) Oral at bedtime PRN Insomnia  ondansetron Injectable 4 milliGRAM(s) IV Push every 8 hours PRN Nausea and/or Vomiting  simethicone 80 milliGRAM(s) Chew every 8 hours PRN Heartburn      Vital Signs Last 24 Hrs  T(C): 36.8 (19 Aug 2024 04:02), Max: 98.6 (18 Aug 2024 08:20)  T(F): 98.2 (19 Aug 2024 04:02), Max: 209.4 (18 Aug 2024 08:20)  HR: 84 (19 Aug 2024 04:02) (68 - 84)  BP: 126/80 (19 Aug 2024 04:02) (126/80 - 140/87)  BP(mean): --  RR: 18 (19 Aug 2024 04:02) (18 - 18)  SpO2: 99% (19 Aug 2024 04:02) (96% - 99%)    Parameters below as of 19 Aug 2024 04:02  Patient On (Oxygen Delivery Method): room air      CAPILLARY BLOOD GLUCOSE      POCT Blood Glucose.: 107 mg/dL (18 Aug 2024 10:44)    I&O's Summary    18 Aug 2024 07:01  -  19 Aug 2024 07:00  --------------------------------------------------------  IN: 240 mL / OUT: 0 mL / NET: 240 mL        PHYSICAL EXAM:  GENERAL: Clinically well-appearing and comfortable  HEAD:  Atraumatic, Normocephalic  EYES: EOMI, PERRL, conjunctiva and sclera clear  NECK: Supple, No JVD  CHEST/LUNG: Clear to auscultation bilaterally; No wheeze  HEART: Regular rate and rhythm; No murmurs, rubs, or gallops  ABDOMEN: Soft, Nontender, Nondistended; Bowel sounds present  EXTREMITIES:  2+ Peripheral Pulses, No clubbing, cyanosis, or edema  PSYCH: Normal mood, Normal affect  NEUROLOGY: non-focal, moving all four extremities  SKIN: No rashes or lesions    LABS:                        12.7   11.12 )-----------( 216      ( 18 Aug 2024 06:46 )             38.0     08-18    128<L>  |  90<L>  |  13  ----------------------------<  65<L>  4.2   |  21<L>  |  0.65    Ca    9.4      18 Aug 2024 06:46  Phos  2.1     08-18  Mg     2.0     08-18    TPro  6.8  /  Alb  3.1<L>  /  TBili  3.3<H>  /  DBili  x   /  AST  132<H>  /  ALT  73<H>  /  AlkPhos  806<H>  08-18          Urinalysis Basic - ( 18 Aug 2024 06:46 )    Color: x / Appearance: x / SG: x / pH: x  Gluc: 65 mg/dL / Ketone: x  / Bili: x / Urobili: x   Blood: x / Protein: x / Nitrite: x   Leuk Esterase: x / RBC: x / WBC x   Sq Epi: x / Non Sq Epi: x / Bacteria: x        RADIOLOGY & ADDITIONAL TESTS:    Imaging Personally Reviewed:    Consultant(s) Notes Reviewed:      Care Discussed with Consultants/Other Providers:

## 2024-08-19 NOTE — PROGRESS NOTE ADULT - PROBLEM SELECTOR PLAN 2
Lactate 4.3 on admission, likely 2/2 poor clearance due to mets to liver, normotensive, normal Spo2  Pt with poor PO intake  s/p IVF  - monitor

## 2024-08-19 NOTE — PROGRESS NOTE ADULT - ATTENDING COMMENTS
73 M w/ Stage IV gastric adenocarcinoma, diagnosed in [facility] admitted with worsening abdominal pain and FTT. Pt with high degree of tumor  burden 1 = Total assistance

## 2024-08-19 NOTE — CHART NOTE - NSCHARTNOTEFT_GEN_A_CORE
Brief GI Note:    73 year old male, PMH HTN, HLD, CAD w/ stents, presenting with SOB, recent diagnosis of stomach adenocarcinoma. EGD in [facility] with stomach mass biopsy.   Pending IR biopsy of liver lesions.     GI will sign off at this time. Please feel free to reconsult as needed, or if additional gastric biopsies are needed.

## 2024-08-20 NOTE — PROGRESS NOTE ADULT - PROBLEM SELECTOR PLAN 5
Bilirubin Total: 3.0 <-1.5,   Direct bili 2.1, Indirect 0.9  Alkaline Phosphatase: 881 <-978  AST: 136 <- 158  ALT: 72 <- 71  Suspected to be due to metastatic infiltration from either singular or multiple mets    -GI and Onc following  -trend CMP  -MRI abdomen result pending possible MRCP SOB on exertion.  Normal SpO2 on Room Air, No WOB, No SOB at rest.  CTA Chest Negative for PE    -TTE LVEF 51%, Grade 1 Diastolic dysfunction

## 2024-08-20 NOTE — PROGRESS NOTE ADULT - ASSESSMENT
72 y/o M, PMH CAD w/5 stents, HTN, HLD, recent Dx of gastric adenocarcinoma, Icelandic speaking, presents with burning epigastric pain associated with eating, shortness of breath on exertion x 2 weeks. He was evaluated in [facility] with endoscopy for the abdominal pain, constipation and black stools on 8/12, which showed a bleeding gastric ulcer, biopsy was taken and hemostasis achieved, Biopsy positive for gastric adenocarcinoma. CT imaging revealed metastasis to liver and adjacent lymph nodes. He elected to pursue further care here in the [place], and deferred any treatment in [facility]. Awaiting liver biopsy 8/20.

## 2024-08-20 NOTE — PROGRESS NOTE ADULT - PROBLEM SELECTOR PLAN 6
SOB on exertion.  Normal SpO2 on Room Air, No WOB, No SOB at rest.  CTA Chest Negative for PE    -TTE to evaluate LVEF 2/2 thrombophlebitis. Small 3cm circular area of redness with associated induration and TTP over dorsum of L hand, no streaking, no spreading redness, no fluctuance, Sensorimotor function of L hand intact. Reports frequent needle sticks in L hand during recent hospitalization in [facility].  -L hand U/S of soft tissue neg for abscess  -warm compresses prn

## 2024-08-20 NOTE — PROGRESS NOTE ADULT - SUBJECTIVE AND OBJECTIVE BOX
***************************************************************  Bony Alexander, PGY 1   Internal Medicine   ***************************************************************    RAJENDRA VASQUEZ  73y  MRN: [id number]    Patient is a 73y old  Male who presents with a chief complaint of Gastric adenocarcinoma (19 Aug 2024 18:04)      Subjective: no events ON. Denies fever, CP, SOB, abn pain, N/V, dysuria. Tolerating diet.      MEDICATIONS  (STANDING):  amLODIPine   Tablet 5 milliGRAM(s) Oral daily  finasteride 5 milliGRAM(s) Oral daily  levothyroxine 50 MICROGram(s) Oral daily  pantoprazole  Injectable 40 milliGRAM(s) IV Push two times a day  polyethylene glycol 3350 17 Gram(s) Oral daily  senna 2 Tablet(s) Oral at bedtime  tamsulosin 0.4 milliGRAM(s) Oral at bedtime    MEDICATIONS  (PRN):  acetaminophen     Tablet .. 650 milliGRAM(s) Oral every 6 hours PRN Temp greater or equal to 38C (100.4F), Mild Pain (1 - 3)  aluminum hydroxide/magnesium hydroxide/simethicone Suspension 30 milliLiter(s) Oral every 4 hours PRN Dyspepsia  melatonin 3 milliGRAM(s) Oral at bedtime PRN Insomnia  ondansetron Injectable 4 milliGRAM(s) IV Push every 8 hours PRN Nausea and/or Vomiting  oxyCODONE    IR 10 milliGRAM(s) Oral every 4 hours PRN Moderate Pain (4 - 6)  simethicone 80 milliGRAM(s) Chew every 8 hours PRN Heartburn      Objective:    Vitals: Vital Signs Last 24 Hrs  T(C): 36.6 (08-20-24 @ 06:55), Max: 36.9 (08-19-24 @ 21:47)  T(F): 97.8 (08-20-24 @ 06:55), Max: 98.4 (08-19-24 @ 21:47)  HR: 65 (08-20-24 @ 06:55) (65 - 96)  BP: 133/68 (08-20-24 @ 06:55) (130/81 - 141/77)  BP(mean): --  RR: 16 (08-20-24 @ 06:55) (16 - 18)  SpO2: 96% (08-20-24 @ 06:55) (95% - 99%)            I&O's Summary      PHYSICAL EXAM:  GENERAL: NAD  HEAD:  Atraumatic, Normocephalic  EYES: EOMI, conjunctiva and sclera clear  CHEST/LUNG: Clear to auscultation bilaterally; No rales, rhonchi, wheezing, or rubs  HEART: Regular rate and rhythm; No murmurs, rubs, or gallops  ABDOMEN: Soft, Nontender, Nondistended;   SKIN: No rashes or lesions  NERVOUS SYSTEM:  Alert & Oriented X3, no focal deficits    LABS:  08-20    125<L>  |  86<L>  |  25<H>  ----------------------------<  78  5.2   |  19<L>  |  0.98  08-19    128<L>  |  90<L>  |  19  ----------------------------<  92  4.5   |  20<L>  |  0.85  08-18    128<L>  |  90<L>  |  13  ----------------------------<  65<L>  4.2   |  21<L>  |  0.65    Ca    9.2      20 Aug 2024 07:20  Ca    9.4      19 Aug 2024 07:09  Ca    9.4      18 Aug 2024 06:46  Phos  3.6     08-20  Mg     2.5     08-20    TPro  7.0  /  Alb  3.2<L>  /  TBili  4.2<H>  /  DBili  x   /  AST  180<H>  /  ALT  103<H>  /  AlkPhos  797<H>  08-20  TPro  6.7  /  Alb  3.0<L>  /  TBili  4.2<H>  /  DBili  x   /  AST  161<H>  /  ALT  91<H>  /  AlkPhos  785<H>  08-19  TPro  6.8  /  Alb  3.1<L>  /  TBili  3.3<H>  /  DBili  x   /  AST  132<H>  /  ALT  73<H>  /  AlkPhos  806<H>  08-18      PT/INR - ( 20 Aug 2024 07:20 )   PT: 10.8 sec;   INR: 1.03 ratio                       Urinalysis Basic - ( 20 Aug 2024 07:20 )    Color: x / Appearance: x / SG: x / pH: x  Gluc: 78 mg/dL / Ketone: x  / Bili: x / Urobili: x   Blood: x / Protein: x / Nitrite: x   Leuk Esterase: x / RBC: x / WBC x   Sq Epi: x / Non Sq Epi: x / Bacteria: x                              12.0   12.78 )-----------( 224      ( 20 Aug 2024 07:20 )             36.1                         12.7   11.12 )-----------( 216      ( 18 Aug 2024 06:46 )             38.0     CAPILLARY BLOOD GLUCOSE          RADIOLOGY & ADDITIONAL TESTS:    Imaging Personally Reviewed:  [x ] YES  [ ] NO    Consultants involved in case:   Consultant(s) Notes Reviewed:  [ x] YES  [ ] NO:   Care Discussed with Consultants/Other Providers [x ] YES  [ ] NO         ***************************************************************  Bony Alexander, PGY 1   Internal Medicine   ***************************************************************    RAJENDRA VASQUEZ  73y  MRN: [id number]    Patient is a 73y old  Male who presents with a chief complaint of Gastric adenocarcinoma (19 Aug 2024 18:04)      Subjective: Pt had 4-5 episodes of coffee ground emesis yesterday, one loose black stool last night after bowel regimen. He reports the same RUQ abdominal discomfort. Denies fever, CP, SOB, dysuria. Tolerating diet.      MEDICATIONS  (STANDING):  amLODIPine   Tablet 5 milliGRAM(s) Oral daily  finasteride 5 milliGRAM(s) Oral daily  levothyroxine 50 MICROGram(s) Oral daily  pantoprazole  Injectable 40 milliGRAM(s) IV Push two times a day  polyethylene glycol 3350 17 Gram(s) Oral daily  senna 2 Tablet(s) Oral at bedtime  tamsulosin 0.4 milliGRAM(s) Oral at bedtime    MEDICATIONS  (PRN):  acetaminophen     Tablet .. 650 milliGRAM(s) Oral every 6 hours PRN Temp greater or equal to 38C (100.4F), Mild Pain (1 - 3)  aluminum hydroxide/magnesium hydroxide/simethicone Suspension 30 milliLiter(s) Oral every 4 hours PRN Dyspepsia  melatonin 3 milliGRAM(s) Oral at bedtime PRN Insomnia  ondansetron Injectable 4 milliGRAM(s) IV Push every 8 hours PRN Nausea and/or Vomiting  oxyCODONE    IR 10 milliGRAM(s) Oral every 4 hours PRN Moderate Pain (4 - 6)  simethicone 80 milliGRAM(s) Chew every 8 hours PRN Heartburn      Objective:    Vitals: Vital Signs Last 24 Hrs  T(C): 36.6 (08-20-24 @ 06:55), Max: 36.9 (08-19-24 @ 21:47)  T(F): 97.8 (08-20-24 @ 06:55), Max: 98.4 (08-19-24 @ 21:47)  HR: 65 (08-20-24 @ 06:55) (65 - 96)  BP: 133/68 (08-20-24 @ 06:55) (130/81 - 141/77)  BP(mean): --  RR: 16 (08-20-24 @ 06:55) (16 - 18)  SpO2: 96% (08-20-24 @ 06:55) (95% - 99%)            I&O's Summary      PHYSICAL EXAM:  GENERAL: NAD, jaundiced  HEAD:  Atraumatic, Normocephalic  EYES: EOMI, pupils PERRL, +scleral icterus.  CHEST/LUNG: Clear to auscultation bilaterally; No rales, rhonchi, wheezing, or rubs  HEART: Regular rate and rhythm; No murmurs, rubs, or gallops  ABDOMEN: Epigastric and RUQ TTP, abdomen soft, nondistended, bowel sounds present.  EXT: L Hand: Small 3cm circular area of redness with associated induration and TTP over dorsum of L hand, no streaking, no spreading redness, no fluctuance.                      Sensorimotor function of L hand intact.           LE: trace LE b/l pitting edema, no calf tenderness.   SKIN: No rashes or lesions  NERVOUS SYSTEM:  Alert & Oriented X3, no focal deficits      LABS:  08-20    125<L>  |  86<L>  |  25<H>  ----------------------------<  78  5.2   |  19<L>  |  0.98  08-19    128<L>  |  90<L>  |  19  ----------------------------<  92  4.5   |  20<L>  |  0.85  08-18    128<L>  |  90<L>  |  13  ----------------------------<  65<L>  4.2   |  21<L>  |  0.65    Ca    9.2      20 Aug 2024 07:20  Ca    9.4      19 Aug 2024 07:09  Ca    9.4      18 Aug 2024 06:46  Phos  3.6     08-20  Mg     2.5     08-20    TPro  7.0  /  Alb  3.2<L>  /  TBili  4.2<H>  /  DBili  x   /  AST  180<H>  /  ALT  103<H>  /  AlkPhos  797<H>  08-20  TPro  6.7  /  Alb  3.0<L>  /  TBili  4.2<H>  /  DBili  x   /  AST  161<H>  /  ALT  91<H>  /  AlkPhos  785<H>  08-19  TPro  6.8  /  Alb  3.1<L>  /  TBili  3.3<H>  /  DBili  x   /  AST  132<H>  /  ALT  73<H>  /  AlkPhos  806<H>  08-18      PT/INR - ( 20 Aug 2024 07:20 )   PT: 10.8 sec;   INR: 1.03 ratio                       Urinalysis Basic - ( 20 Aug 2024 07:20 )    Color: x / Appearance: x / SG: x / pH: x  Gluc: 78 mg/dL / Ketone: x  / Bili: x / Urobili: x   Blood: x / Protein: x / Nitrite: x   Leuk Esterase: x / RBC: x / WBC x   Sq Epi: x / Non Sq Epi: x / Bacteria: x                              12.0   12.78 )-----------( 224      ( 20 Aug 2024 07:20 )             36.1                         12.7   11.12 )-----------( 216      ( 18 Aug 2024 06:46 )             38.0     CAPILLARY BLOOD GLUCOSE          RADIOLOGY & ADDITIONAL TESTS:    Imaging Personally Reviewed:  [x ] YES  [ ] NO    Consultants involved in case:   Consultant(s) Notes Reviewed:  [ x] YES  [ ] NO:   Care Discussed with Consultants/Other Providers [x ] YES  [ ] NO

## 2024-08-20 NOTE — PROGRESS NOTE ADULT - PROBLEM SELECTOR PLAN 4
2/2 thrombophlebitis. Small 3cm circular area of redness with associated induration and TTP over dorsum of L hand, no streaking, no spreading redness, no fluctuance, Sensorimotor function of L hand intact. Reports frequent needle sticks in L hand during recent hospitalization in [facility].  -L hand U/S of soft tissue neg for abscess  -warm compresses prn Lactate 4.3 on admission, likely 2/2 poor clearance due to mets to liver, normotensive, normal Spo2  Pt with poor PO intake  s/p IVF: 3.8  - monitor

## 2024-08-20 NOTE — PROGRESS NOTE ADULT - PROBLEM SELECTOR PLAN 1
Biopsy proven in [facility] from endoscopy on 8/12/24  -Onc following, recs given  ------will require repeat diagnostic biopsy (likely IR guided biopsy of liver lesion or hepatic adenopathy); will require HER2, programmed death ligand 1 (PD-L1),and microsatellite testing/NGS.  ------HIV, H-pylori testing, f/u  ------Pending MR pelvis result  ------pending IR guided liver bx scheduled for 8/20  -GI following, recs given  -------RUQ US with doppler for elevated liver enzymes: shows heterogenous nodular liver w/mets  -------Hepatitis panel negative  -------benefits of ASA in setting of CAD w/5 stents outweigh increased risk of GI bleeding, will hold due to pending IR procedure for liver mets biopsy. Biopsy proven in [facility] from endoscopy on 8/12/24  Coffee ground emesis yesterday x 4-5 episodes, 1 episode of small black stool last night.  EGD by GI canceled by anesthesia due to HypoNa.  After discussion among Onc and GI, plan to hold additional procedures until discussion held with family.    -Onc following, recs given  -GI following, recs given  -Family meeting to be held regarding next steps on 8/21 at 3pm.

## 2024-08-20 NOTE — PROGRESS NOTE ADULT - PROBLEM SELECTOR PLAN 4
plan for IDT family meeting with oncology tomorrow at 1p, family notified of time of family meeting  pt is full code

## 2024-08-20 NOTE — PROGRESS NOTE ADULT - SUBJECTIVE AND OBJECTIVE BOX
SUBJECTIVE AND OBJECTIVE: pt seen and examined at bedside. pt awake, oob to chair. no acute complaints on exam   Indication for Geriatrics and Palliative Care Services/INTERVAL HPI: goc     OVERNIGHT EVENTS: pt used 1x zofran prn in 24 hours     DNR on chart:  Allergies    No Known Allergies    Intolerances    MEDICATIONS  (STANDING):  finasteride 5 milliGRAM(s) Oral daily  levothyroxine 50 MICROGram(s) Oral daily  pantoprazole  Injectable 40 milliGRAM(s) IV Push two times a day  polyethylene glycol 3350 17 Gram(s) Oral daily  senna 2 Tablet(s) Oral at bedtime  sodium chloride 2% . 300 milliLiter(s) (60 mL/Hr) IV Continuous <Continuous>  tamsulosin 0.4 milliGRAM(s) Oral at bedtime    MEDICATIONS  (PRN):  acetaminophen     Tablet .. 650 milliGRAM(s) Oral every 6 hours PRN Temp greater or equal to 38C (100.4F), Mild Pain (1 - 3)  aluminum hydroxide/magnesium hydroxide/simethicone Suspension 30 milliLiter(s) Oral every 4 hours PRN Dyspepsia  melatonin 3 milliGRAM(s) Oral at bedtime PRN Insomnia  morphine  - Injectable 4 milliGRAM(s) IV Push every 4 hours PRN Severe Pain (7 - 10)  ondansetron Injectable 4 milliGRAM(s) IV Push every 8 hours PRN Nausea and/or Vomiting  oxyCODONE    IR 10 milliGRAM(s) Oral every 4 hours PRN Moderate Pain (4 - 6)  simethicone 80 milliGRAM(s) Chew every 8 hours PRN Heartburn      ITEMS UNCHECKED ARE NOT PRESENT    PRESENT SYMPTOMS: [ ]Unable to self-report - see [ ] CPOT [ ] PAINADS [ ] RDOS  Source if other than patient:  [ ]Family   [ ]Team     Pain:  [ ]yes [ x]no  QOL impact -   Location -                    Aggravating factors -  Quality -  Radiation -  Timing-  Severity (0-10 scale):  Minimal acceptable level (0-10 scale):     CPOT:    https://www.sccm.org/getattachment/wwb89l38-4x4p-8l1a-5y2e-5011w5619t1i/Critical-Care-Pain-Observation-Tool-(CPOT)    PAINAD Score: See PAINAD tool and score below       Dyspnea:                           [ ]Mild [ ]Moderate [ ]Severe    RDOS: See RDOS tool and score below   0 to 2  minimal or no respiratory distress   3  mild distress  4 to 6 moderate distress  >7 severe distress      Anxiety:                             [ ]Mild [ ]Moderate [ ]Severe  Fatigue:                             [x ]Mild [ ]Moderate [ ]Severe  Nausea:                             [ ]Mild [ ]Moderate [ ]Severe  Loss of appetite:              [x ]Mild [ ]Moderate [ ]Severe  Constipation:                    [ ]Mild [ ]Moderate [ ]Severe    PCSSQ[Palliative Care Spiritual Screening Question]   Severity (0-10):  Score of 4 or > indicate consideration of Chaplaincy referral.  Chaplaincy Referral: [x ] yes [ ] refused [ x] following [ ] Deferred     Caregiver [place]? : [ ] yes [ ] no [ ] Deferred [ ] Declined             Social work referral [ ] Patient & Family Centered Care Referral [ ]     Anticipatory Grief present?:  x ] yes [ ] no  [ ] Deferred                  Social work referral [ ] Chaplaincy Referral [ x]    		  Other Symptoms:  [x ]All other review of systems negative     Palliative Performance Status Version 2:   See PPSv2 tool and score below         PHYSICAL EXAM:  Vital Signs Last 24 Hrs  T(C): 36.8 (20 Aug 2024 12:10), Max: 36.9 (19 Aug 2024 21:47)  T(F): 98.2 (20 Aug 2024 12:10), Max: 98.4 (19 Aug 2024 21:47)  HR: 63 (20 Aug 2024 12:10) (63 - 96)  BP: 126/67 (20 Aug 2024 12:10) (126/67 - 141/77)  BP(mean): --  RR: 16 (20 Aug 2024 06:55) (16 - 18)  SpO2: 97% (20 Aug 2024 12:10) (95% - 99%)    Parameters below as of 20 Aug 2024 12:10  Patient On (Oxygen Delivery Method): room air     I&O's Summary    20 Aug 2024 07:01  -  20 Aug 2024 12:45  --------------------------------------------------------  IN: 0 mL / OUT: 350 mL / NET: -350 mL       GENERAL: [ ]Cachexia    [ x]Alert  [x ]Oriented x 3  [ ]Lethargic  [ ]Unarousable  [ ]Verbal  [ ]Non-Verbal  Behavioral:   [ ]Anxiety  [ ]Delirium [ ]Agitation [ ]Other  HEENT:  [ ]Normal   [x ]Dry mouth   [ ]ET Tube/Trach  [ ]Oral lesions  PULMONARY:   [ ]Clear [ ]Tachypnea  [ ]Audible excessive secretions   [ ]Rhonchi        [ ]Right [ ]Left [ ]Bilateral  [ ]Crackles        [ ]Right [ ]Left [ ]Bilateral  [ ]Wheezing     [ ]Right [ ]Left [ ]Bilateral  [x ]Diminished BS [ ] Right [ ]Left [x ]Bilateral  CARDIOVASCULAR:    [x ]Regular [ ]Irregular [ ]Tachy  [ ]Galdino [ ]Murmur [ ]Other  GASTROINTESTINAL:  [ ]Soft  [ x]Distended   [x ]+BS  [ ]Non tender [ x]Tender  [ ]Other [ ]PEG [ ]OGT/ NGT   Last BM:   GENITOURINARY:  [ ]Normal [x ]Incontinent   [ ]Oliguria/Anuria   [ ]Morales  MUSCULOSKELETAL:   [ ]Normal   [ x]Weakness  [x ]Bed/Wheelchair bound [ ]Edema  NEUROLOGIC:   [ ]No focal deficits  [x ] Cognitive impairment  [ ] Dysphagia [ ]Dysarthria [ ] Paresis [ ]Other   SKIN:   [ ]Normal  [ ]Rash  [ ]Other  [ ]Pressure ulcer(s) [ ]y [ ]n present on admission    CRITICAL CARE:  [ ]Shock Present  [ ]Septic [ ]Cardiogenic [ ]Neurologic [ ]Hypovolemic  [ ]Vasopressors [ ]Inotropes  [ ]Respiratory failure present [ ]Mechanical Ventilation [ ]Non-invasive ventilatory support [ ]High-Flow   [ ]Acute  [ ]Chronic [ ]Hypoxic  [ ]Hypercarbic [ ]Other  [ ]Other organ failure     LABS:                        12.0   12.78 )-----------( 224      ( 20 Aug 2024 07:20 )             36.1   08-20    125<L>  |  86<L>  |  25<H>  ----------------------------<  78  5.2   |  19<L>  |  0.98    Ca    9.2      20 Aug 2024 07:20  Phos  3.6     08-20  Mg     2.5     08-20    TPro  7.0  /  Alb  3.2<L>  /  TBili  4.2<H>  /  DBili  x   /  AST  180<H>  /  ALT  103<H>  /  AlkPhos  797<H>  08-20  PT/INR - ( 20 Aug 2024 07:20 )   PT: 10.8 sec;   INR: 1.03 ratio         PTT - ( 20 Aug 2024 09:38 )  PTT:26.9 sec    Urinalysis Basic - ( 20 Aug 2024 07:20 )    Color: x / Appearance: x / SG: x / pH: x  Gluc: 78 mg/dL / Ketone: x  / Bili: x / Urobili: x   Blood: x / Protein: x / Nitrite: x   Leuk Esterase: x / RBC: x / WBC x   Sq Epi: x / Non Sq Epi: x / Bacteria: x      RADIOLOGY & ADDITIONAL STUDIES:  < from: MR Abdomen w/ IV Cont (08.18.24 @ 11:29) >  ACC: [id number] EXAM:  MR ABDOMEN IC   ORDERED BY: DADA TURNER     PROCEDURE DATE:  08/18/2024          INTERPRETATION:  CLINICAL INFORMATION: Metastatic gastric cancer with   liver metastasis, bilirubin 1.5, rule out dominant stricture    COMPARISON: CT from 8/16/2024.    CONTRAST/COMPLICATIONS:  IV Contrast: Gadavist  7 cc administered   .5 cc discarded  Oral Contrast: NONE  Complications: None reported at time of study completion    PROCEDURE:  MRI of the abdomen was performed.  MRCP was performed.    FINDINGS:  LOWER CHEST: Within normal limits.    LIVER: Innumerable liver lesions are visible throughout the liver which   is similar to the recent prior CT and consistent with known metastasis.   The larger lesions in the right liver isapproximately 5 cm on image 1 of   series 11. There is mild compression and displacement of the hepatic   veins however the liver vasculature is patent..  BILE DUCTS: The MRCP images demonstrate significant compression and   distortion of the intrahepatic bile ducts, best seen on images 7 through   9 of series 12 with pronounced compression of the hilar region of the   bile ducts on image 8 of series 12 and associated mild left-sided   intrahepatic ductal dilatation, also best seen on image 8 of series 12.   The left hepatic duct is dilated to approximately 5 mm on image 34 of   series 29. The common hepatic duct and the common bile duct are within   normal limits measuring approximately 4 mm, best seen on image 41 of   series 29. No filling defects or strictures visible within the CBD.  GALLBLADDER: Mildly contracted gallbladder is visible. No evidence for   cholelithiasis or cholecystitis..  SPLEEN: Within normal limits.  PANCREAS: Mildly prominent and somewhat edematous pancreas is visible on   the T2-weighted sequences. Mild soft tissue stranding around the pancreas   and small amount of free fluid/ascites in the upper abdomen; these   findings are nonspecific however mild pancreatitis cannot be excluded. No   pancreatic ductal dilatation visible..  ADRENALS: Within normal limits.  KIDNEYS/URETERS: Small bilateral renal cysts. No evidence for   hydronephrosis.    VISUALIZED PORTIONS:  BOWEL: A gastric mass is visible on image 5 of series 11, approximately   measuring 2.7 cm..  PERITONEUM: Small amount of ascites is visible in the upper abdomen,   mainly around the liver and in the left paracolic gutter..  VESSELS: Within normal limits.  RETROPERITONEUM/LYMPH NODES: No lymphadenopathy.  ABDOMINAL WALL: Within normal limits.  BONES: Within normal limits.    IMPRESSION:  Innumerable large liver metastasis are noted, causing significant   compression and distortion of the intrahepatic bile ducts; the hilar   region of the bowel demonstrates somewhat narrowed and there is   associated left sided intrahepatic biliary dilatation with a left hepatic   duct measuring up to 5 mm.    The common hepatic duct and the common bile duct are within normal limits   and measures up to 4 mm. No evidence for choledocholithiasis or a CBD   stricture.    No evidence for cholelithiasis or cholecystitis.    Known gastric mass present.    Mildly prominent and somewhat edematous pancreas is visible with mild   soft tissue stranding around the pancreas and small amount of ascites;   thesefindings are nonspecific however mild acute pancreatitis cannot be   entirely excluded.    --- End of Report ---            ISAI MCCRACKEN MD; Attending Radiologist  This document has been electronically signed. Aug 18 2024  2:21PM    < end of copied text >    Protein Calorie Malnutrition Present: [ ]mild [ ]moderate [ ]severe [ ]underweight [ ]morbid obesity  https://www.andeal.org/vault/2440/web/files/ONC/Table_Clinical%20Characteristics%20to%20Document%20Malnutrition-White%20JV%20et%20al%202012.pdf    Height (cm): 165.1 (08-16-24 @ 17:26)  Weight (kg): 68 (08-16-24 @ 17:26)  BMI (kg/m2): 24.9 (08-16-24 @ 17:26)    [x ]PPSV2 < or = 30%  [ ]significant weight loss [ ]poor nutritional intake [ ]anasarca[ ]Artificial Nutrition    Other REFERRALS:  [ ]Hospice  [ ]Child Life  [ ]Social Work  [ ]Case management [ ]Holistic Therapy     Goals of Care Document:

## 2024-08-20 NOTE — PROGRESS NOTE ADULT - ASSESSMENT
74 y/o M, PMH CAD w/5 stents, HTN, HLD, recent Dx of gastric adenocarcinoma, Green[place]ic speaking, presents with burning epigastric pain associated with eating, shortness of breath on exertion x 2 weeks. He was evaluated in [facility] with endoscopy for the abdominal pain, constipation and black stools on 8/12, which showed a bleeding gastric ulcer, biopsy was taken and hemostasis achieved, Biopsy positive for gastric adenocarcinoma. CT imaging revealed metastasis to liver and adjacent lymph nodes. He elected to pursue further care here in the [place], and deferred any treatment in [facility]. Awaiting liver biopsy 8/20. (Taken from internal medicine note). Palliative consulted for GOC.

## 2024-08-20 NOTE — PROGRESS NOTE ADULT - ASSESSMENT
73 year old male, PMH HTN, HLD, CAD w/ stents, presenting with SOB, recent diagnosis of stomach adenocarcinoma. Found to have elevated liver enzymes.   Elevated liver tests could be 2/2 hepatic mets, though predominantly alk phos, c/w cholestatic. CT abdomen without and duct dilation or stones visualized.     GI reconsulted for multiple episodes of coffee ground emesis yesterday.     #Gastric adenocarcinoma  #CAD w/ stents  #Elevated liver enzymes   #Hyponatremia     Recommendations:   -pending IR biopsy of liver lesions, deferred today given hematemesis   -melena and CGE from known stomach malignancy   -can consider EGD w/ hemospray when patient medically optimized (sodium corrected), though this will only be a temporary fix and will likely re-bleed  -if recurrent hematemesis, consider ICU consult/ intubation for airway protection     Note incomplete until finalized by attending signature/attestation.    Sandy Landeros  GI/Hepatology Fellow    MONDAY-FRIDAY 8AM-5PM:  Pager# [phone] ([facility]) or [phone] ([facility])    NON-URGENT CONSULTS:  Please email [email or web address]  AT NIGHT AND ON WEEKENDS:  Contact on-call GI fellow from 5pm-8am and on weekends/holidays

## 2024-08-20 NOTE — PROGRESS NOTE ADULT - PROBLEM SELECTOR PLAN 2
Lactate 4.3 on admission, likely 2/2 poor clearance due to mets to liver, normotensive, normal Spo2  Pt with poor PO intake  s/p IVF  - monitor 125 this AM, 128 on admission.  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 Repeat Sodium at 129

## 2024-08-20 NOTE — PROGRESS NOTE ADULT - ATTENDING COMMENTS
Patient seen and evaluted at bedside. Coffee ground emesis x 4 overnight with 1 melenotic stool. Also emesis with attempt to eat fruit.   PAtient pain improved.     Labs  Hyponatremia with hyperkalemia  Rise of creatinine.        #Metastatic gastric cancer  -IR bx cancelled in setting of coffee ground emesis  -GI and oncology appreciated  -EGD if sodium improves with hemospray.  -personally discussed with advanced GI fellow. anatomy not ammenable to biliary stent placement 2/2 tumor burden.  -oncology appreciated, family meeting 8/21 at 3pM    #hyponatremia  -hypovolemic vs. euvolemic 2/2 SIADH from pain  -Esme, UOSm  -2%NaCl to attempt to get patient to endoscopy  -fluids vs. restriction pending urine studies.   -noted hyperkalemia: Normal BP, no adrenal mets on imaging, less suspicious for adrenal insufficiency.    #transaminitis and hyperbilirubinemai, worsening  -MRI with left sided ductal dilation in addition to overal intrahepatic duct disruption by innumerable tumors.    -not amenable for stent per advanced GI. pending goc conversation may discuss with 'IR if percutaneous drain an option.    #Hx cAD  ASA when cleear by IR and GIB controlled.    #hypothyroidism'  , continue with synthroid    #neoplastic pain  -given poor po intake, change to morphine 4mg q4hr prn    #Advanced care planning  -family meeting at 3PM    Prognosis guarded, timing pending rise of bilirubin and abilityt o tolerate any chemotherapy.

## 2024-08-20 NOTE — PROGRESS NOTE ADULT - SUBJECTIVE AND OBJECTIVE BOX
Chief Complaint:  Patient is a 73y old  Male who presents with a chief complaint of Gastric adenocarcinoma (20 Aug 2024 09:31)      Interval Events:   -5 episodes of coffee grounded emesis yesterday, one episode overnight    -Hemoglobin stable     Allergies:  No Known Allergies      Hospital Medications:  acetaminophen     Tablet .. 650 milliGRAM(s) Oral every 6 hours PRN  aluminum hydroxide/magnesium hydroxide/simethicone Suspension 30 milliLiter(s) Oral every 4 hours PRN  dextrose 50% Injectable 25 milliLiter(s) IV Push once  finasteride 5 milliGRAM(s) Oral daily  levothyroxine 50 MICROGram(s) Oral daily  melatonin 3 milliGRAM(s) Oral at bedtime PRN  morphine  - Injectable 4 milliGRAM(s) IV Push every 4 hours PRN  ondansetron Injectable 4 milliGRAM(s) IV Push every 8 hours PRN  oxyCODONE    IR 10 milliGRAM(s) Oral every 4 hours PRN  pantoprazole  Injectable 40 milliGRAM(s) IV Push two times a day  polyethylene glycol 3350 17 Gram(s) Oral daily  senna 2 Tablet(s) Oral at bedtime  simethicone 80 milliGRAM(s) Chew every 8 hours PRN  sodium chloride 2% . 300 milliLiter(s) IV Continuous <Continuous>  tamsulosin 0.4 milliGRAM(s) Oral at bedtime        PHYSICAL EXAM:   Vital Signs:  Vital Signs Last 24 Hrs  T(C): 36.6 (20 Aug 2024 06:55), Max: 36.9 (19 Aug 2024 21:47)  T(F): 97.8 (20 Aug 2024 06:55), Max: 98.4 (19 Aug 2024 21:47)  HR: 65 (20 Aug 2024 06:55) (65 - 96)  BP: 133/68 (20 Aug 2024 06:55) (130/81 - 141/77)  BP(mean): --  RR: 16 (20 Aug 2024 06:55) (16 - 18)  SpO2: 96% (20 Aug 2024 06:55) (95% - 99%)    Parameters below as of 20 Aug 2024 06:55  Patient On (Oxygen Delivery Method): room air      Daily     Daily     GENERAL:  No acute distress  HEENT:  NCAT, no scleral icterus  CHEST: no resp distress  HEART:  RRR  ABDOMEN:  Soft, non-tender, non-distended, normoactive bowel sounds, no masses, no hepato-splenomegaly, no signs of chronic liver disease  EXTREMITIES:  No cyanosis, clubbing, or edema  SKIN:  No rash/erythema/ecchymoses/petechiae/wounds/abscess/warm/dry  NEURO:  Alert and oriented x 3, no asterixis, no tremor    LABS:                        12.0   12.78 )-----------( 224      ( 20 Aug 2024 07:20 )             36.1     Mean Cell Volume: 90.0 fl (08-20-24 @ 07:20)    08-20    125<L>  |  86<L>  |  25<H>  ----------------------------<  78  5.2   |  19<L>  |  0.98    Ca    9.2      20 Aug 2024 07:20  Phos  3.6     08-20  Mg     2.5     08-20    TPro  7.0  /  Alb  3.2<L>  /  TBili  4.2<H>  /  DBili  x   /  AST  180<H>  /  ALT  103<H>  /  AlkPhos  797<H>  08-20    LIVER FUNCTIONS - ( 20 Aug 2024 07:20 )  Alb: 3.2 g/dL / Pro: 7.0 g/dL / ALK PHOS: 797 U/L / ALT: 103 U/L / AST: 180 U/L / GGT: x           PT/INR - ( 20 Aug 2024 07:20 )   PT: 10.8 sec;   INR: 1.03 ratio         PTT - ( 20 Aug 2024 09:38 )  PTT:26.9 sec  Urinalysis Basic - ( 20 Aug 2024 07:20 )    Color: x / Appearance: x / SG: x / pH: x  Gluc: 78 mg/dL / Ketone: x  / Bili: x / Urobili: x   Blood: x / Protein: x / Nitrite: x   Leuk Esterase: x / RBC: x / WBC x   Sq Epi: x / Non Sq Epi: x / Bacteria: x        Imaging:    < from: MR Abdomen w/ IV Cont (08.18.24 @ 11:29) >  IMPRESSION:  Innumerable large liver metastasis are noted, causing significant   compression and distortion of the intrahepatic bile ducts; the hilar   region of the bowel demonstrates somewhat narrowed and there is   associated left sided intrahepatic biliary dilatation with a left hepatic   duct measuring up to 5 mm.    The common hepatic duct and the common bile duct are within normal limits   and measures up to 4 mm. No evidence for choledocholithiasis or a CBD   stricture.    No evidence for cholelithiasis or cholecystitis.    Known gastric mass present.    Mildly prominent and somewhat edematous pancreas is visible with mild   soft tissue stranding around the pancreas and small amount of ascites;   thesefindings are nonspecific however mild acute pancreatitis cannot be   entirely excluded.    --- End of Report ---      < end of copied text >

## 2024-08-20 NOTE — PROGRESS NOTE ADULT - ATTENDING COMMENTS
Patient seen and examined. Agree w above. Son at bedside. Patient sitting up in chair. Known gastric adenoCA. Coffee ground emesis overnight. Hb stable. GI bleed from gastric CA. If with persistent vomiting, may need mech intubation for airway protection. EGD if drop in Hb and medically optimized (currently hyponatremic).

## 2024-08-20 NOTE — PROGRESS NOTE ADULT - PROBLEM SELECTOR PLAN 3
127, 128 on admission.  Serum Osm: 266  Urine Osm: 528  Urine Na: 122  Likely SiADH due to malignancy, possibly contributed by hypovolemia in setting of Poor po intake    -trend BMP  -fluid restrict 1.5L/day Bilirubin Total: 3.0 <-1.5,   Direct bili 2.1, Indirect 0.9  Alkaline Phosphatase: 797 <-<-  881 <- 978  AST: 180 <- 136 <- 158  ALT: 103 <- 72 <- 71  Suspected to be due to metastatic infiltration from multiple mets    -GI and Onc following  -trend CMP

## 2024-08-20 NOTE — PROGRESS NOTE ADULT - PROBLEM SELECTOR PLAN 5
will continue to follow for goc  case discussed with primary and oncology team  Can be reached by TEAMS [id number] Clarisa Mao Any other time please page [phone] if needed

## 2024-08-21 NOTE — PROGRESS NOTE ADULT - PROBLEM SELECTOR PLAN 1
appreciate heme/onc input  pain well controlled on exam, 0 prns\  pain education provided to pt and family, pt identifying pain as not severe enough to require pain medication, if concerned pt not receiving needed pain medication can consider initiating q8 ATC with the option to refuse.

## 2024-08-21 NOTE — PROGRESS NOTE ADULT - PROBLEM SELECTOR PLAN 2
125 this AM, 128 on admission.  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 Repeat Sodium at 129

## 2024-08-21 NOTE — PROGRESS NOTE ADULT - SUBJECTIVE AND OBJECTIVE BOX
SUBJECTIVE AND OBJECTIVE: pt seen and examined at bedside. pt awake and alert. able to participate in exam   Indication for Geriatrics and Palliative Care Services/INTERVAL HPI: [facility]   Italian  utilized Vanita [id number], patient with the assistance of Italian  stating he prefers his family hears medical information and make decisions on his behalf   OVERNIGHT EVENTS:    DNR on chart:  Allergies    No Known Allergies    Intolerances    MEDICATIONS  (STANDING):  finasteride 5 milliGRAM(s) Oral daily  lactated ringers. 1000 milliLiter(s) (50 mL/Hr) IV Continuous <Continuous>  levothyroxine 50 MICROGram(s) Oral daily  pantoprazole  Injectable 40 milliGRAM(s) IV Push two times a day  polyethylene glycol 3350 17 Gram(s) Oral daily  senna 2 Tablet(s) Oral at bedtime  sodium chloride 2% . 300 milliLiter(s) (60 mL/Hr) IV Continuous <Continuous>  tamsulosin 0.4 milliGRAM(s) Oral at bedtime    MEDICATIONS  (PRN):  acetaminophen     Tablet .. 650 milliGRAM(s) Oral every 6 hours PRN Temp greater or equal to 38C (100.4F), Mild Pain (1 - 3)  aluminum hydroxide/magnesium hydroxide/simethicone Suspension 30 milliLiter(s) Oral every 4 hours PRN Dyspepsia  melatonin 3 milliGRAM(s) Oral at bedtime PRN Insomnia  morphine  - Injectable 4 milliGRAM(s) IV Push every 4 hours PRN Severe Pain (7 - 10)  ondansetron Injectable 4 milliGRAM(s) IV Push every 8 hours PRN Nausea and/or Vomiting  oxyCODONE    IR 10 milliGRAM(s) Oral every 4 hours PRN Moderate Pain (4 - 6)  simethicone 80 milliGRAM(s) Chew every 8 hours PRN Heartburn      ITEMS UNCHECKED ARE NOT PRESENT    PRESENT SYMPTOMS: [ ]Unable to self-report - see [ ] CPOT [ ] PAINADS [ ] RDOS  Source if other than patient:  [ ]Family   [ ]Team     Pain:  [x ]yes [ ]no- epigastric pain LUQ/RUQ   QOL impact -   Location -                    Aggravating factors -  Quality -  Radiation -  Timing-  Severity (0-10 scale):  Minimal acceptable level (0-10 scale):     CPOT:    https://www.sccm.org/getattachment/exa67c94-3a6i-5w7y-7g6k-0679y2767b6q/Critical-Care-Pain-Observation-Tool-(CPOT)    PAINAD Score: See PAINAD tool and score below       Dyspnea:                           [ ]Mild [ ]Moderate [ ]Severe    RDOS: See RDOS tool and score below   0 to 2  minimal or no respiratory distress   3  mild distress  4 to 6 moderate distress  >7 severe distress      Anxiety:                             [ ]Mild [ ]Moderate [ ]Severe  Fatigue:                             [ ]Mild [ ]Moderate [ ]Severe  Nausea:                             [ ]Mild [ ]Moderate [ ]Severe  Loss of appetite:              [ ]Mild [ ]Moderate [ ]Severe  Constipation:                    [ ]Mild [ ]Moderate [ ]Severe    PCSSQ[Palliative Care Spiritual Screening Question]   Severity (0-10):  Score of 4 or > indicate consideration of Chaplaincy referral.  Chaplaincy Referral: [ ] yes [ ] refused [ ] following [ ] Deferred     Caregiver [place]? : [ ] yes [ ] no [ ] Deferred [ ] Declined             Social work referral [ ] Patient & Family Centered Care Referral [ ]     Anticipatory Grief present?:  [ ] yes [ ] no  [ ] Deferred                  Social work referral [ ] Chaplaincy Referral [ ]    		  Other Symptoms:  [x ]All other review of systems negative     Palliative Performance Status Version 2:   See PPSv2 tool and score below         PHYSICAL EXAM:  Vital Signs Last 24 Hrs  T(C): 36.8 (21 Aug 2024 14:29), Max: 36.8 (21 Aug 2024 05:30)  T(F): 98.3 (21 Aug 2024 14:29), Max: 98.3 (21 Aug 2024 14:29)  HR: 73 (21 Aug 2024 14:29) (68 - 73)  BP: 148/78 (21 Aug 2024 14:29) (130/76 - 148/78)  BP(mean): --  RR: 18 (21 Aug 2024 14:29) (18 - 18)  SpO2: 97% (21 Aug 2024 14:29) (95% - 97%)    Parameters below as of 21 Aug 2024 14:29  Patient On (Oxygen Delivery Method): room air     I&O's Summary    20 Aug 2024 07:01  -  21 Aug 2024 07:00  --------------------------------------------------------  IN: 240 mL / OUT: 350 mL / NET: -110 mL       GENERAL: [ ]Cachexia    [x ]Alert  [ x]Oriented x3   [ ]Lethargic  [ ]Unarousable  [ ]Verbal  [ ]Non-Verbal  Behavioral:   [ ]Anxiety  [ ]Delirium [ ]Agitation [ ]Other  HEENT:  [ ]Normal   [ ]Dry mouth   [ ]ET Tube/Trach  [ ]Oral lesions  PULMONARY:   [ ]Clear [ ]Tachypnea  [ ]Audible excessive secretions   [ ]Rhonchi        [ ]Right [ ]Left [ ]Bilateral  [ ]Crackles        [ ]Right [ ]Left [ ]Bilateral  [ ]Wheezing     [ ]Right [ ]Left [ ]Bilateral  [x ]Diminished BS [ ] Right [ ]Left [x ]Bilateral  CARDIOVASCULAR:    [ x]Regular [ ]Irregular [ ]Tachy  [ ]Galdino [ ]Murmur [ ]Other  GASTROINTESTINAL:  [ ]Soft  [ x]Distended   [x ]+BS  [ ]Non tender [x ]Tender  [ ]Other [ ]PEG [ ]OGT/ NGT   Last BM:   GENITOURINARY:  [x ]Normal [ ]Incontinent   [ ]Oliguria/Anuria   [ ]Morales  MUSCULOSKELETAL:   [ ]Normal   [x ]Weakness  [ ]Bed/Wheelchair bound [ ]Edema  NEUROLOGIC:   [ x]No focal deficits  [ ] Cognitive impairment  [ ] Dysphagia [ ]Dysarthria [ ] Paresis [ ]Other   SKIN:   [ x]Normal  [ ]Rash  [ ]Other  [ ]Pressure ulcer(s) [ ]y [ ]n present on admission    CRITICAL CARE:  [ ]Shock Present  [ ]Septic [ ]Cardiogenic [ ]Neurologic [ ]Hypovolemic  [ ]Vasopressors [ ]Inotropes  [ ]Respiratory failure present [ ]Mechanical Ventilation [ ]Non-invasive ventilatory support [ ]High-Flow   [ ]Acute  [ ]Chronic [ ]Hypoxic  [ ]Hypercarbic [ ]Other  [ ]Other organ failure     LABS:                        12.1   11.37 )-----------( 194      ( 21 Aug 2024 07:54 )             37.5   08-21    129<L>  |  91<L>  |  23  ----------------------------<  63<L>  5.0   |  19<L>  |  0.94    Ca    9.4      21 Aug 2024 07:53  Phos  3.6     08-20  Mg     2.5     08-20    TPro  6.9  /  Alb  3.1<L>  /  TBili  5.0<H>  /  DBili  x   /  AST  248<H>  /  ALT  121<H>  /  AlkPhos  849<H>  08-21  PT/INR - ( 20 Aug 2024 07:20 )   PT: 10.8 sec;   INR: 1.03 ratio         PTT - ( 20 Aug 2024 09:38 )  PTT:26.9 sec    Urinalysis Basic - ( 21 Aug 2024 07:53 )    Color: x / Appearance: x / SG: x / pH: x  Gluc: 63 mg/dL / Ketone: x  / Bili: x / Urobili: x   Blood: x / Protein: x / Nitrite: x   Leuk Esterase: x / RBC: x / WBC x   Sq Epi: x / Non Sq Epi: x / Bacteria: x      RADIOLOGY & ADDITIONAL STUDIES:  < from: MR Abdomen w/ IV Cont (08.18.24 @ 11:29) >    ACC: [id number] EXAM:  MR ABDOMEN IC   ORDERED BY: DADA TURNER     PROCEDURE DATE:  08/18/2024          INTERPRETATION:  CLINICAL INFORMATION: Metastatic gastric cancer with   liver metastasis, bilirubin 1.5, rule out dominant stricture    COMPARISON: CT from 8/16/2024.    CONTRAST/COMPLICATIONS:  IV Contrast: Gadavist  7 cc administered   .5 cc discarded  Oral Contrast: NONE  Complications: None reported at time of study completion    PROCEDURE:  MRI of the abdomen was performed.  MRCP was performed.    FINDINGS:  LOWER CHEST: Within normal limits.    LIVER: Innumerable liver lesions are visible throughout the liver which   is similar to the recent prior CT and consistent with known metastasis.   The larger lesions in the right liver isapproximately 5 cm on image 1 of   series 11. There is mild compression and displacement of the hepatic   veins however the liver vasculature is patent..  BILE DUCTS: The MRCP images demonstrate significant compression and   distortion of the intrahepatic bile ducts, best seen on images 7 through   9 of series 12 with pronounced compression of the hilar region of the   bile ducts on image 8 of series 12 and associated mild left-sided   intrahepatic ductal dilatation, also best seen on image 8 of series 12.   The left hepatic duct is dilated to approximately 5 mm on image 34 of   series 29. The common hepatic duct and the common bile duct are within   normal limits measuring approximately 4 mm, best seen on image 41 of   series 29. No filling defects or strictures visible within the CBD.  GALLBLADDER: Mildly contracted gallbladder is visible. No evidence for   cholelithiasis or cholecystitis..  SPLEEN: Within normal limits.  PANCREAS: Mildly prominent and somewhat edematous pancreas is visible on   the T2-weighted sequences. Mild soft tissue stranding around the pancreas   and small amount of free fluid/ascites in the upper abdomen; these   findings are nonspecific however mild pancreatitis cannot be excluded. No   pancreatic ductal dilatation visible..  ADRENALS: Within normal limits.  KIDNEYS/URETERS: Small bilateral renal cysts. No evidence for   hydronephrosis.    VISUALIZED PORTIONS:  BOWEL: A gastric mass is visible on image 5 of series 11, approximately   measuring 2.7 cm..  PERITONEUM: Small amount of ascites is visible in the upper abdomen,   mainly around the liver and in the left paracolic gutter..  VESSELS: Within normal limits.  RETROPERITONEUM/LYMPH NODES: No lymphadenopathy.  ABDOMINAL WALL: Within normal limits.  BONES: Within normal limits.    IMPRESSION:  Innumerable large liver metastasis are noted, causing significant   compression and distortion of the intrahepatic bile ducts; the hilar   region of the bowel demonstrates somewhat narrowed and there is   associated left sided intrahepatic biliary dilatation with a left hepatic   duct measuring up to 5 mm.    The common hepatic duct and the common bile duct are within normal limits   and measures up to 4 mm. No evidence for choledocholithiasis or a CBD   stricture.    No evidence for cholelithiasis or cholecystitis.    Known gastric mass present.    Mildly prominent and somewhat edematous pancreas is visible with mild   soft tissue stranding around the pancreas and small amount of ascites;   thesefindings are nonspecific however mild acute pancreatitis cannot be   entirely excluded.    --- End of Report ---            ISAI MCCRACKEN MD; Attending Radiologist  This document has been electronically signed. Aug 18 2024  2:21PM    < end of copied text >    Protein Calorie Malnutrition Present: [ ]mild [ ]moderate [ ]severe [ ]underweight [ ]morbid obesity  https://www.andeal.org/vault/2440/web/files/ONC/Table_Clinical%20Characteristics%20to%20Document%20Malnutrition-White%20JV%20et%20al%202012.pdf    Height (cm): 165.1 (08-16-24 @ 17:26)  Weight (kg): 68 (08-16-24 @ 17:26)  BMI (kg/m2): 24.9 (08-16-24 @ 17:26)    [ ]PPSV2 < or = 30%  [ ]significant weight loss [ ]poor nutritional intake [ ]anasarca[ ]Artificial Nutrition    Other REFERRALS:  [ ]Hospice  [ ]Child Life  [ ]Social Work  [ ]Case management [ ]Holistic Therapy     Goals of Care Document:

## 2024-08-21 NOTE — PROGRESS NOTE ADULT - ATTENDING COMMENTS
Patient seen and evaluted at bedside. No further emesis or melena. Tolerated coffee this AM.  Please see family meeting discussion from palliative. Multidisciplinary meeting with oncology, medicine and palliative care. Discussed overall poor prognosis and how patient is not a candidate for conventional chemotherapy. Discussed biopsy to see if candidate for immnotherapy, but would have to be safe to discharge to get as outpatient.   Discussed code status, family to decide. Son made HCP.      Labs  Na 129  Bilirubin uptrending        #Metastatic gastric cancer  -see above conversation  -IR for liver bx with MMR testing  -if bleeds again consideration of EGD with hemospray.  -poor prognosis overall. If no MMR mutation likely transition to comfort care measures.     #hyponatremia  -Urine studies more consistent with hypovolemic hyponatremia  -NaCl, monitor.  -pain control as likely some component of SIADH.      #transaminitis and hyperbilirubinemai, worsening  -not amenable to stent per advanced GI    #Hx cADg  -given high risk of bleed and active bleed 24 hours prior, will hold ASA    #hypothyroidism'  , continue with synthroid    #disposition: Anticipate worsening bilirubin and functional status and doubt patient will have functional capacity to be able to [facility] for treatment even if candidate for immunotherapy, but I hope to be proven wrong.  #neoplastic pain  -given poor po intake, change to morphine 4mg q4hr prn    #Advanced care planning  -family meeting at 3PM    Prognosis guarded, timing pending rise of bilirubin and abilityt o tolerate any chemotherapy.

## 2024-08-21 NOTE — PROGRESS NOTE ADULT - PROBLEM SELECTOR PLAN 6
2/2 thrombophlebitis. Small 3cm circular area of redness with associated induration and TTP over dorsum of L hand, no streaking, no spreading redness, no fluctuance, Sensorimotor function of L hand intact. Reports frequent needle sticks in L hand during recent hospitalization in [facility].  -L hand U/S of soft tissue neg for abscess  -warm compresses prn

## 2024-08-21 NOTE — PROGRESS NOTE ADULT - PROBLEM SELECTOR PLAN 1
Biopsy proven in [facility] from endoscopy on 8/12/24  Coffee ground emesis yesterday x 4-5 episodes, 1 episode of small black stool last night.  EGD by GI canceled by anesthesia due to HypoNa.  After discussion among Onc and GI, plan to hold additional procedures until discussion held with family.    -Onc following, recs given  -GI following, recs given  -Family meeting to be held regarding next steps on 8/21 at 3pm. Biopsy proven in [facility] from endoscopy on 8/12/24  Coffee ground emesis yesterday x 4-5 episodes, 1 episode of small black stool last night.  EGD by GI canceled by anesthesia due to HypoNa.  After discussion with Onc and GI, plan to hold additional procedures until discussion held with family.     -Onc following, recs given  -GI following, recs given  -Family meeting to be held regarding next steps on 8/21 at 3pm.

## 2024-08-21 NOTE — PROGRESS NOTE ADULT - PROBLEM SELECTOR PLAN 3
Bilirubin Total: 3.0 <-1.5,   Direct bili 2.1, Indirect 0.9  Alkaline Phosphatase: 797 <-<-  881 <- 978  AST: 180 <- 136 <- 158  ALT: 103 <- 72 <- 71  Suspected to be due to metastatic infiltration from multiple mets    -GI and Onc following  -trend CMP Bilirubin Total: 5.0 <- 4.2 <- 3.3  Alkaline Phosphatase: 849 <- 797 <- 785  AST: 248 <- 180 <- 158  ALT: 121 <- 103 <- 91  Suspected to be due to metastatic infiltration from multiple mets  Trending upwards.    -GI and Onc following  -trend CMP

## 2024-08-21 NOTE — PROGRESS NOTE ADULT - ASSESSMENT
73 year old male, PMH HTN, HLD, CAD w/ stents, presenting with SOB, recent diagnosis of stomach adenocarcinoma in [facility] Oncology consulted for further recommendations    #Newly diagnosed metastatic gastric adenocarcinoma in [facility]  #Transaminitis likely 2/2 to infiltrative disease vs stricture  - Patient recently visiting family in [facility], had full workup performed there for epigastric pain and melena, including egd with pathology showing adenocarcinoma and imaging showing mets to liver and surround adenopathy.. Patient complains of shortness of breath for the past week worse with exertion and for the past 2 days complaining of nausea and epigastric pain with the eating with subjective 5kg weight loss over past few days. In the ED, Hgb 12.8, Bili 1.5, Alk Phos 978, , ALT 71, lactate 5.4. Reports last screening colonoscopy was 2011 and 2018. No prior endoscopy apart from this past EGD in [facility] Family brought reports from [facility] at beside   - CTA negative for PE or pulmonary mets, show mass within the wall of the lesser curvature of the gastric body   representing the patient's gastric malignancy, many hepatics mets and gastrohepatic lymphadenopathy.   - Appreciate GI eval: agree will require repeat diagnostic biopsy (likely IR guided biopsy of liver lesion or hepatic adenopathy); will require HER2, programmed death ligand 1 (PD-L1),and microsatellite testing/NGS.  - MR abd/pelvis: Innumerable large liver metastasis are noted, causing significant compression and distortion of the intrahepatic bile ducts; the hilar region of the bowel demonstrates somewhat narrowed and there is associated left sided intrahepatic biliary dilatation with a left hepatic duct measuring up to 5 mm.The common hepatic duct and the common bile duct are within normal limits and measures up to 4 mm. No evidence for choledocholithiasis or a CBD stricture.No evidence for cholelithiasis or cholecystitis.Known gastric mass present.  - Given uptrending bilirubin levels and MR findings, recommend advanced GI eval  - Pending IR guided liver biopsy 8/20 however given hyponatremia and coffee gound emessis was deferred as initially was considering EGD via GI  - Recommend IR reconsult for liver guided biopsy to determine molecular status to determine if a candidate for immunotherapy   -Initially, inpatient palliative chemotherapy was considered as a treatment option. However, given the current clinical scenario, including the extensive metastatic disease, the rising bilirubin levels, and the patient's overall declining functional status, we now have concerns that chemotherapy may not be beneficial. In fact, administering chemotherapy at this stage could potentially exacerbate the patient's condition and negatively impact their quality of life. To be discussed with family if condition worsens/no improvement.   - Pending palliative care family discussion today 8/20/24 at 3:00 pm   - Adjusted pain regimen, oxycontin 10 mg q4 prn   - Please add bowel regimen prn  - HIV, hepatitis panel negative; pending H-pylori testing results   - Advised on physical therapy and nutrition optimization   - Oncology team to follow  - Plan discussed with son and patient at bedside.       **INCOMPLETE NOTE**    Note not finalized until signed by attending.   Please do not hesitate to page with questions.     Luh Masters MD  Hematology/Oncology Fellow PGY5  Available on [employer] Teams   Pager: [phone]  For weekends and evenings (5 pm - 8 am), please page fellow on call 73 year old male, PMH HTN, HLD, CAD w/ stents, presenting with SOB, recent diagnosis of stomach adenocarcinoma in [facility] Oncology consulted for further recommendations    #Newly diagnosed metastatic gastric adenocarcinoma in [facility]  #Transaminitis likely 2/2 to infiltrative disease vs stricture  - Patient recently visiting family in [facility], had full workup performed there for epigastric pain and melena, including egd with pathology showing adenocarcinoma and imaging showing mets to liver and surround adenopathy.. Patient complains of shortness of breath for the past week worse with exertion and for the past 2 days complaining of nausea and epigastric pain with the eating with subjective 5kg weight loss over past few days. In the ED, Hgb 12.8, Bili 1.5, Alk Phos 978, , ALT 71, lactate 5.4. Reports last screening colonoscopy was 2011 and 2018. No prior endoscopy apart from this past EGD in [facility] Family brought reports from [facility] at beside   - CTA negative for PE or pulmonary mets, show mass within the wall of the lesser curvature of the gastric body   representing the patient's gastric malignancy, many hepatics mets and gastrohepatic lymphadenopathy.   - Appreciate GI eval: agree will require repeat diagnostic biopsy (likely IR guided biopsy of liver lesion or hepatic adenopathy); will require HER2, programmed death ligand 1 (PD-L1),and microsatellite testing/NGS.  - MR abd/pelvis: Innumerable large liver metastasis are noted, causing significant compression and distortion of the intrahepatic bile ducts; the hilar region of the bowel demonstrates somewhat narrowed and there is associated left sided intrahepatic biliary dilatation with a left hepatic duct measuring up to 5 mm.The common hepatic duct and the common bile duct are within normal limits and measures up to 4 mm. No evidence for choledocholithiasis or a CBD stricture.No evidence for cholelithiasis or cholecystitis.Known gastric mass present.  - Given uptrending bilirubin levels and MR findings, recommend advanced GI eval  - Pending IR guided liver biopsy 8/20 however given hyponatremia and coffee ground emesis was deferred as initially was considering EGD via GI  - Recommend IR reconsult for liver guided biopsy to determine molecular status to determine if a candidate for immunotherapy   - Apprecaite palliative care eval.   -8/21/24: GOC discussion occurred alongside the palliative, primary team, patients son and uncle. Initially, inpatient palliative chemotherapy was considered as a treatment option. However, given the current clinical scenario, including the extensive metastatic disease, rising bilirubin levels, and the patient’s overall declining functional status, we now have concerns that chemotherapy may not be beneficial. In fact, administering chemotherapy at this stage could potentially exacerbate the patient’s condition and negatively impact their quality of life. If the patient remains stable, we recommend pursuing a biopsy to determine MMR status (although there is a very low percentage of this being positive) and to assess eligibility for immunotherapy. If not eligible, we would strongly recommend hospice care.  - Adjusted pain regimen, oxycontin 10 mg q4 prn, bowel regimen per primary team   - HIV, hepatitis panel negative  - Advised on physical therapy and nutrition optimization   - Oncology team to follow    Note not finalized until signed by attending.   Please do not hesitate to page with questions.     Luh Masters MD  Hematology/Oncology Fellow PGY5  Available on [employer] Teams   Pager: [phone]  For weekends and evenings (5 pm - 8 am), please page fellow on call

## 2024-08-21 NOTE — PROGRESS NOTE ADULT - ASSESSMENT
72 y/o M, PMH CAD w/5 stents, HTN, HLD, recent Dx of gastric adenocarcinoma, Syriac speaking, presents with burning epigastric pain associated with eating, shortness of breath on exertion x 2 weeks. He was evaluated in [facility] with endoscopy for the abdominal pain, constipation and black stools on 8/12, which showed a bleeding gastric ulcer, biopsy was taken and hemostasis achieved, Biopsy positive for gastric adenocarcinoma. CT imaging revealed metastasis to liver and adjacent lymph nodes. He elected to pursue further care here in the [place], and deferred any treatment in [facility]. Awaiting liver biopsy 8/20.

## 2024-08-21 NOTE — PROGRESS NOTE ADULT - SUBJECTIVE AND OBJECTIVE BOX
***************************************************************  Bony Alexander, PGY 1   Internal Medicine   ***************************************************************    RAJENDRA VASQUEZ  73y  MRN: [id number]    Patient is a 73y old  Male who presents with a chief complaint of Gastric adenocarcinoma (20 Aug 2024 12:41)      Subjective: no events ON. Denies fever, CP, SOB, abn pain, N/V, dysuria. Tolerating diet.      MEDICATIONS  (STANDING):  finasteride 5 milliGRAM(s) Oral daily  levothyroxine 50 MICROGram(s) Oral daily  pantoprazole  Injectable 40 milliGRAM(s) IV Push two times a day  polyethylene glycol 3350 17 Gram(s) Oral daily  senna 2 Tablet(s) Oral at bedtime  sodium chloride 2% . 300 milliLiter(s) (60 mL/Hr) IV Continuous <Continuous>  tamsulosin 0.4 milliGRAM(s) Oral at bedtime    MEDICATIONS  (PRN):  acetaminophen     Tablet .. 650 milliGRAM(s) Oral every 6 hours PRN Temp greater or equal to 38C (100.4F), Mild Pain (1 - 3)  aluminum hydroxide/magnesium hydroxide/simethicone Suspension 30 milliLiter(s) Oral every 4 hours PRN Dyspepsia  melatonin 3 milliGRAM(s) Oral at bedtime PRN Insomnia  morphine  - Injectable 4 milliGRAM(s) IV Push every 4 hours PRN Severe Pain (7 - 10)  ondansetron Injectable 4 milliGRAM(s) IV Push every 8 hours PRN Nausea and/or Vomiting  oxyCODONE    IR 10 milliGRAM(s) Oral every 4 hours PRN Moderate Pain (4 - 6)  simethicone 80 milliGRAM(s) Chew every 8 hours PRN Heartburn      Objective:    Vitals: Vital Signs Last 24 Hrs  T(C): 36.8 (08-21-24 @ 05:30), Max: 36.8 (08-20-24 @ 12:10)  T(F): 98.2 (08-21-24 @ 05:30), Max: 98.2 (08-20-24 @ 12:10)  HR: 73 (08-21-24 @ 05:30) (63 - 73)  BP: 130/76 (08-21-24 @ 05:30) (126/67 - 146/78)  BP(mean): --  RR: 18 (08-21-24 @ 05:30) (18 - 18)  SpO2: 95% (08-21-24 @ 05:30) (95% - 97%)            I&O's Summary    20 Aug 2024 07:01  -  21 Aug 2024 07:00  --------------------------------------------------------  IN: 240 mL / OUT: 350 mL / NET: -110 mL        PHYSICAL EXAM:  GENERAL: NAD  HEAD:  Atraumatic, Normocephalic  EYES: EOMI, conjunctiva and sclera clear  CHEST/LUNG: Clear to auscultation bilaterally; No rales, rhonchi, wheezing, or rubs  HEART: Regular rate and rhythm; No murmurs, rubs, or gallops  ABDOMEN: Soft, Nontender, Nondistended;   SKIN: No rashes or lesions  NERVOUS SYSTEM:  Alert & Oriented X3, no focal deficits    LABS:  08-20    129<L>  |  91<L>  |  27<H>  ----------------------------<  92  4.8   |  21<L>  |  0.97  08-20    127<L>  |  90<L>  |  28<H>  ----------------------------<  96  4.9   |  21<L>  |  1.02  08-20    125<L>  |  86<L>  |  25<H>  ----------------------------<  78  5.2   |  19<L>  |  0.98    Ca    9.2      20 Aug 2024 16:37  Ca    9.3      20 Aug 2024 14:17  Ca    9.2      20 Aug 2024 07:20  Phos  3.6     08-20  Mg     2.5     08-20    TPro  7.0  /  Alb  3.2<L>  /  TBili  4.2<H>  /  DBili  x   /  AST  180<H>  /  ALT  103<H>  /  AlkPhos  797<H>  08-20  TPro  6.7  /  Alb  3.0<L>  /  TBili  4.2<H>  /  DBili  x   /  AST  161<H>  /  ALT  91<H>  /  AlkPhos  785<H>  08-19      PT/INR - ( 20 Aug 2024 07:20 )   PT: 10.8 sec;   INR: 1.03 ratio         PTT - ( 20 Aug 2024 09:38 )  PTT:26.9 sec              Urinalysis Basic - ( 20 Aug 2024 16:37 )    Color: x / Appearance: x / SG: x / pH: x  Gluc: 92 mg/dL / Ketone: x  / Bili: x / Urobili: x   Blood: x / Protein: x / Nitrite: x   Leuk Esterase: x / RBC: x / WBC x   Sq Epi: x / Non Sq Epi: x / Bacteria: x                              12.0   12.78 )-----------( 224      ( 20 Aug 2024 07:20 )             36.1     CAPILLARY BLOOD GLUCOSE          RADIOLOGY & ADDITIONAL TESTS:    Imaging Personally Reviewed:  [x ] YES  [ ] NO    Consultants involved in case:   Consultant(s) Notes Reviewed:  [ x] YES  [ ] NO:   Care Discussed with Consultants/Other Providers [x ] YES  [ ] NO         ***************************************************************  Bony Alexander, PGY 1   Internal Medicine   ***************************************************************    RAJENDRA VASQUEZ  73y  MRN: [id number]    Patient is a 73y old  Male who presents with a chief complaint of Gastric adenocarcinoma (20 Aug 2024 12:41)      Subjective: no events ON. Pt had an episode of dark stools last night. No coffee ground emesis, no hematemesis. He reports intermittent RUQ abdominal pain. Denies fever, CP, SOB, N/V, dysuria.    MEDICATIONS  (STANDING):  finasteride 5 milliGRAM(s) Oral daily  levothyroxine 50 MICROGram(s) Oral daily  pantoprazole  Injectable 40 milliGRAM(s) IV Push two times a day  polyethylene glycol 3350 17 Gram(s) Oral daily  senna 2 Tablet(s) Oral at bedtime  sodium chloride 2% . 300 milliLiter(s) (60 mL/Hr) IV Continuous <Continuous>  tamsulosin 0.4 milliGRAM(s) Oral at bedtime    MEDICATIONS  (PRN):  acetaminophen     Tablet .. 650 milliGRAM(s) Oral every 6 hours PRN Temp greater or equal to 38C (100.4F), Mild Pain (1 - 3)  aluminum hydroxide/magnesium hydroxide/simethicone Suspension 30 milliLiter(s) Oral every 4 hours PRN Dyspepsia  melatonin 3 milliGRAM(s) Oral at bedtime PRN Insomnia  morphine  - Injectable 4 milliGRAM(s) IV Push every 4 hours PRN Severe Pain (7 - 10)  ondansetron Injectable 4 milliGRAM(s) IV Push every 8 hours PRN Nausea and/or Vomiting  oxyCODONE    IR 10 milliGRAM(s) Oral every 4 hours PRN Moderate Pain (4 - 6)  simethicone 80 milliGRAM(s) Chew every 8 hours PRN Heartburn      Objective:    Vitals: Vital Signs Last 24 Hrs  T(C): 36.8 (08-21-24 @ 05:30), Max: 36.8 (08-20-24 @ 12:10)  T(F): 98.2 (08-21-24 @ 05:30), Max: 98.2 (08-20-24 @ 12:10)  HR: 73 (08-21-24 @ 05:30) (63 - 73)  BP: 130/76 (08-21-24 @ 05:30) (126/67 - 146/78)  BP(mean): --  RR: 18 (08-21-24 @ 05:30) (18 - 18)  SpO2: 95% (08-21-24 @ 05:30) (95% - 97%)            I&O's Summary    20 Aug 2024 07:01  -  21 Aug 2024 07:00  --------------------------------------------------------  IN: 240 mL / OUT: 350 mL / NET: -110 mL        PHYSICAL EXAM:  HEAD:  Atraumatic, Normocephalic  EYES: EOMI, pupils PERRL, +scleral icterus.  CHEST/LUNG: Clear to auscultation bilaterally; No rales, rhonchi, wheezing, or rubs  HEART: Regular rate and rhythm; No murmurs, rubs, or gallops  ABDOMEN: Epigastric and RUQ TTP, abdomen soft, nondistended, bowel sounds present.  EXT: L Hand: Small 3cm circular area of redness with associated induration and TTP over dorsum of L hand, no streaking, no spreading redness, no fluctuance.                      Sensorimotor function of L hand intact.           LE: trace LE b/l pitting edema, no calf tenderness.   SKIN: No rashes or lesions  NERVOUS SYSTEM:  Alert & Oriented X3, no focal deficits      LABS:  08-20    129<L>  |  91<L>  |  27<H>  ----------------------------<  92  4.8   |  21<L>  |  0.97  08-20    127<L>  |  90<L>  |  28<H>  ----------------------------<  96  4.9   |  21<L>  |  1.02  08-20    125<L>  |  86<L>  |  25<H>  ----------------------------<  78  5.2   |  19<L>  |  0.98    Ca    9.2      20 Aug 2024 16:37  Ca    9.3      20 Aug 2024 14:17  Ca    9.2      20 Aug 2024 07:20  Phos  3.6     08-20  Mg     2.5     08-20    TPro  7.0  /  Alb  3.2<L>  /  TBili  4.2<H>  /  DBili  x   /  AST  180<H>  /  ALT  103<H>  /  AlkPhos  797<H>  08-20  TPro  6.7  /  Alb  3.0<L>  /  TBili  4.2<H>  /  DBili  x   /  AST  161<H>  /  ALT  91<H>  /  AlkPhos  785<H>  08-19      PT/INR - ( 20 Aug 2024 07:20 )   PT: 10.8 sec;   INR: 1.03 ratio         PTT - ( 20 Aug 2024 09:38 )  PTT:26.9 sec              Urinalysis Basic - ( 20 Aug 2024 16:37 )    Color: x / Appearance: x / SG: x / pH: x  Gluc: 92 mg/dL / Ketone: x  / Bili: x / Urobili: x   Blood: x / Protein: x / Nitrite: x   Leuk Esterase: x / RBC: x / WBC x   Sq Epi: x / Non Sq Epi: x / Bacteria: x                              12.0   12.78 )-----------( 224      ( 20 Aug 2024 07:20 )             36.1     CAPILLARY BLOOD GLUCOSE          RADIOLOGY & ADDITIONAL TESTS:    Imaging Personally Reviewed:  [x ] YES  [ ] NO    Consultants involved in case:   Consultant(s) Notes Reviewed:  [ x] YES  [ ] NO:   Care Discussed with Consultants/Other Providers [x ] YES  [ ] NO

## 2024-08-21 NOTE — PROGRESS NOTE ADULT - PROBLEM SELECTOR PLAN 4
Lactate 4.3 on admission, likely 2/2 poor clearance due to mets to liver, normotensive, normal Spo2  Pt with poor PO intake  s/p IVF: 3.8  - monitor Lactate 4.3 on admission, likely 2/2 poor clearance due to mets to liver, normotensive, normal Spo2  Pt with poor PO intake  s/p IVF: 3.8

## 2024-08-21 NOTE — PROGRESS NOTE ADULT - ATTENDING COMMENTS
71 M w/ h/o Stage IV gastric cancer dx in [facility]. Noted to have worsening liver function tests over the past few days. MRCP with a central conglomerate mass/masses with intrahepatic duct dilatation but no area that can be stented successfully as per discussion with advance GI. In addition pt had developed coffee ground emesis and melena, hyponatremia. Liver bx cancelled. Family meeting held today and informed pt's family of poor prognosis and likely inability to treat cancer due to worsening liver failure. However at the off chance that tumor is MSIH, recommend bx to eval for MMR IHC as MSIH tumors can be treated with checkpoint inhibitors which can be given even with elevated Tbili. Family understanding of plan. All ques answered.

## 2024-08-21 NOTE — PROGRESS NOTE ADULT - PROBLEM SELECTOR PLAN 5
SOB on exertion.  Normal SpO2 on Room Air, No WOB, No SOB at rest.  CTA Chest Negative for PE    -TTE LVEF 51%, Grade 1 Diastolic dysfunction

## 2024-08-21 NOTE — PROGRESS NOTE ADULT - PROBLEM SELECTOR PLAN 5
will continue to follow for goc  case discussed with primary and oncology team  Can be reached by TEAMS MKhrisF 9-5 Clarisa Mao Any other time please page [phone] if needed

## 2024-08-21 NOTE — CHART NOTE - NSCHARTNOTEFT_GEN_A_CORE
73y Male with HTN, HLD, CAD w/ stents, presenting with SOB, recent diagnosis of stomach adenocarcinoma now found to have liver lesions. IR consulted for bx.     Per d/w oncology and family would be amenable for immunomodulator therapy pending bx, not safe for chemo given liver fxn    - case reviewed and approved for Liver lesion bx 8/22  - please place IR procedure order under Dr. Palacios   - STAT labs in AM (cbc,coags, bmp, T&S)  - cont hold AC  - [email or web address]    Dario Boyle, DO/MS  Interventional Radiology resident, PGY3  Contact on [employer] TEAMs for nonemergent issues    - Nonemergent consults:  [facility] order "Consult- Interventional Radiology", no page required  - Emergent issues (pager): [facility] [phone]; [facility] [phone]; [phone]; DO NOT PAGE FOR SCHEDULING QUESTIONS  - Scheduling questions 8am-6pm : [facility] [phone]; [facility] [phone],   - Clinic/outpatient booking: [facility] [phone]; [facility] [phone].

## 2024-08-21 NOTE — PROGRESS NOTE ADULT - ASSESSMENT
74 y/o M, PMH CAD w/5 stents, HTN, HLD, recent Dx of gastric adenocarcinoma, Kazakh speaking, presents with burning epigastric pain associated with eating, shortness of breath on exertion x 2 weeks. He was evaluated in [facility] with endoscopy for the abdominal pain, constipation and black stools on 8/12, which showed a bleeding gastric ulcer, biopsy was taken and hemostasis achieved, Biopsy positive for gastric adenocarcinoma. CT imaging revealed metastasis to liver and adjacent lymph nodes. He elected to pursue further care here in the [place], and deferred any treatment in [facility]. Awaiting liver biopsy 8/20. (Taken from internal medicine note). Palliative consulted for GOC.

## 2024-08-21 NOTE — PROGRESS NOTE ADULT - SUBJECTIVE AND OBJECTIVE BOX
Oncology Follow-up    INTERVAL HPI/OVERNIGHT EVENTS:  Patient S&E at bedside. No o/n events, patient resting comfortably. No complaints at this time. Patient denies fever, chills, dizziness, weakness, CP, palpitations, SOB, cough, N/V/D/C, dysuria, changes in bowel movements, LE edema.    VITAL SIGNS:  T(F): 98.2 (08-21-24 @ 05:30)  HR: 73 (08-21-24 @ 05:30)  BP: 130/76 (08-21-24 @ 05:30)  RR: 18 (08-21-24 @ 05:30)  SpO2: 95% (08-21-24 @ 05:30)  Wt(kg): --    PHYSICAL EXAM:    Constitutional: AAOx3, NAD,   Eyes: PERRL, EOMI, sclera non-icteric  Neck: supple, no masses, no JVD  Respiratory: CTA b/l, good air entry b/l, no wheezing, rhonchi, rales, with normal respiratory effort and no intercostal retractions  Cardiovascular: RRR, normal S1S2, no M/R/G  Gastrointestinal: soft, NTND, no masses palpable, BS normal in all four quadrants, no HSM  Extremities:  no c/c/e  Neurological: Grossly intact  Skin: Normal temperature    MEDICATIONS  (STANDING):  finasteride 5 milliGRAM(s) Oral daily  lactated ringers. 1000 milliLiter(s) (50 mL/Hr) IV Continuous <Continuous>  levothyroxine 50 MICROGram(s) Oral daily  pantoprazole  Injectable 40 milliGRAM(s) IV Push two times a day  polyethylene glycol 3350 17 Gram(s) Oral daily  senna 2 Tablet(s) Oral at bedtime  sodium chloride 2% . 300 milliLiter(s) (60 mL/Hr) IV Continuous <Continuous>  tamsulosin 0.4 milliGRAM(s) Oral at bedtime    MEDICATIONS  (PRN):  acetaminophen     Tablet .. 650 milliGRAM(s) Oral every 6 hours PRN Temp greater or equal to 38C (100.4F), Mild Pain (1 - 3)  aluminum hydroxide/magnesium hydroxide/simethicone Suspension 30 milliLiter(s) Oral every 4 hours PRN Dyspepsia  melatonin 3 milliGRAM(s) Oral at bedtime PRN Insomnia  morphine  - Injectable 4 milliGRAM(s) IV Push every 4 hours PRN Severe Pain (7 - 10)  ondansetron Injectable 4 milliGRAM(s) IV Push every 8 hours PRN Nausea and/or Vomiting  oxyCODONE    IR 10 milliGRAM(s) Oral every 4 hours PRN Moderate Pain (4 - 6)  simethicone 80 milliGRAM(s) Chew every 8 hours PRN Heartburn      No Known Allergies      LABS:                        12.1   11.37 )-----------( 194      ( 21 Aug 2024 07:54 )             37.5     08-21    129<L>  |  91<L>  |  23  ----------------------------<  63<L>  5.0   |  19<L>  |  0.94    Ca    9.4      21 Aug 2024 07:53  Phos  3.6     08-20  Mg     2.5     08-20    TPro  6.9  /  Alb  3.1<L>  /  TBili  5.0<H>  /  DBili  x   /  AST  248<H>  /  ALT  121<H>  /  AlkPhos  849<H>  08-21    PT/INR - ( 20 Aug 2024 07:20 )   PT: 10.8 sec;   INR: 1.03 ratio         PTT - ( 20 Aug 2024 09:38 )  PTT:26.9 sec   Urinalysis Basic - ( 21 Aug 2024 07:53 )    Color: x / Appearance: x / SG: x / pH: x  Gluc: 63 mg/dL / Ketone: x  / Bili: x / Urobili: x   Blood: x / Protein: x / Nitrite: x   Leuk Esterase: x / RBC: x / WBC x   Sq Epi: x / Non Sq Epi: x / Bacteria: x        RADIOLOGY & ADDITIONAL TESTS:  Studies reviewed.   Oncology Follow-up    INTERVAL HPI/OVERNIGHT EVENTS:  Patient seen and evaluated at bedside accompanied by family. No overnight events.    VITAL SIGNS:  T(F): 98.2 (08-21-24 @ 05:30)  HR: 73 (08-21-24 @ 05:30)  BP: 130/76 (08-21-24 @ 05:30)  RR: 18 (08-21-24 @ 05:30)  SpO2: 95% (08-21-24 @ 05:30)  Wt(kg): --    PHYSICAL EXAM   GENERAL: thin elderly male  HEAD:  Atraumatic, Normocephalic  EYES: EOMI, conjunctiva and sclera clear  CHEST/LUNG: Clear to auscultation bilaterally  HEART: Regular rate and rhythm  ABDOMEN: Soft, Nontender, Nondistended  EXTREMITIES: No clubbing, cyanosis, or edema  NEUROLOGY: non-focal  SKIN: No rashes or lesions    MEDICATIONS  (STANDING):  finasteride 5 milliGRAM(s) Oral daily  lactated ringers. 1000 milliLiter(s) (50 mL/Hr) IV Continuous <Continuous>  levothyroxine 50 MICROGram(s) Oral daily  pantoprazole  Injectable 40 milliGRAM(s) IV Push two times a day  polyethylene glycol 3350 17 Gram(s) Oral daily  senna 2 Tablet(s) Oral at bedtime  sodium chloride 2% . 300 milliLiter(s) (60 mL/Hr) IV Continuous <Continuous>  tamsulosin 0.4 milliGRAM(s) Oral at bedtime    MEDICATIONS  (PRN):  acetaminophen     Tablet .. 650 milliGRAM(s) Oral every 6 hours PRN Temp greater or equal to 38C (100.4F), Mild Pain (1 - 3)  aluminum hydroxide/magnesium hydroxide/simethicone Suspension 30 milliLiter(s) Oral every 4 hours PRN Dyspepsia  melatonin 3 milliGRAM(s) Oral at bedtime PRN Insomnia  morphine  - Injectable 4 milliGRAM(s) IV Push every 4 hours PRN Severe Pain (7 - 10)  ondansetron Injectable 4 milliGRAM(s) IV Push every 8 hours PRN Nausea and/or Vomiting  oxyCODONE    IR 10 milliGRAM(s) Oral every 4 hours PRN Moderate Pain (4 - 6)  simethicone 80 milliGRAM(s) Chew every 8 hours PRN Heartburn      No Known Allergies      LABS:                        12.1   11.37 )-----------( 194      ( 21 Aug 2024 07:54 )             37.5     08-21    129<L>  |  91<L>  |  23  ----------------------------<  63<L>  5.0   |  19<L>  |  0.94    Ca    9.4      21 Aug 2024 07:53  Phos  3.6     08-20  Mg     2.5     08-20    TPro  6.9  /  Alb  3.1<L>  /  TBili  5.0<H>  /  DBili  x   /  AST  248<H>  /  ALT  121<H>  /  AlkPhos  849<H>  08-21    PT/INR - ( 20 Aug 2024 07:20 )   PT: 10.8 sec;   INR: 1.03 ratio         PTT - ( 20 Aug 2024 09:38 )  PTT:26.9 sec   Urinalysis Basic - ( 21 Aug 2024 07:53 )    Color: x / Appearance: x / SG: x / pH: x  Gluc: 63 mg/dL / Ketone: x  / Bili: x / Urobili: x   Blood: x / Protein: x / Nitrite: x   Leuk Esterase: x / RBC: x / WBC x   Sq Epi: x / Non Sq Epi: x / Bacteria: x        RADIOLOGY & ADDITIONAL TESTS:  Studies reviewed.   Oncology Follow-up    INTERVAL HPI/OVERNIGHT EVENTS:  Patient seen and evaluated at bedside accompanied by family. No overnight events. Feels weak today. Eating very little.     VITAL SIGNS:  T(F): 98.2 (08-21-24 @ 05:30)  HR: 73 (08-21-24 @ 05:30)  BP: 130/76 (08-21-24 @ 05:30)  RR: 18 (08-21-24 @ 05:30)  SpO2: 95% (08-21-24 @ 05:30)  Wt(kg): --    PHYSICAL EXAM   GENERAL: thin elderly male  HEAD:  Atraumatic, Normocephalic  EYES: EOMI, conjunctiva and sclera clear  CHEST/LUNG: Clear to auscultation bilaterally  HEART: Regular rate and rhythm  ABDOMEN: Soft, Nontender, Nondistended  EXTREMITIES: No clubbing, cyanosis, or edema  NEUROLOGY: non-focal  SKIN: No rashes or lesions    MEDICATIONS  (STANDING):  finasteride 5 milliGRAM(s) Oral daily  lactated ringers. 1000 milliLiter(s) (50 mL/Hr) IV Continuous <Continuous>  levothyroxine 50 MICROGram(s) Oral daily  pantoprazole  Injectable 40 milliGRAM(s) IV Push two times a day  polyethylene glycol 3350 17 Gram(s) Oral daily  senna 2 Tablet(s) Oral at bedtime  sodium chloride 2% . 300 milliLiter(s) (60 mL/Hr) IV Continuous <Continuous>  tamsulosin 0.4 milliGRAM(s) Oral at bedtime    MEDICATIONS  (PRN):  acetaminophen     Tablet .. 650 milliGRAM(s) Oral every 6 hours PRN Temp greater or equal to 38C (100.4F), Mild Pain (1 - 3)  aluminum hydroxide/magnesium hydroxide/simethicone Suspension 30 milliLiter(s) Oral every 4 hours PRN Dyspepsia  melatonin 3 milliGRAM(s) Oral at bedtime PRN Insomnia  morphine  - Injectable 4 milliGRAM(s) IV Push every 4 hours PRN Severe Pain (7 - 10)  ondansetron Injectable 4 milliGRAM(s) IV Push every 8 hours PRN Nausea and/or Vomiting  oxyCODONE    IR 10 milliGRAM(s) Oral every 4 hours PRN Moderate Pain (4 - 6)  simethicone 80 milliGRAM(s) Chew every 8 hours PRN Heartburn      No Known Allergies      LABS:                        12.1   11.37 )-----------( 194      ( 21 Aug 2024 07:54 )             37.5     08-21    129<L>  |  91<L>  |  23  ----------------------------<  63<L>  5.0   |  19<L>  |  0.94    Ca    9.4      21 Aug 2024 07:53  Phos  3.6     08-20  Mg     2.5     08-20    TPro  6.9  /  Alb  3.1<L>  /  TBili  5.0<H>  /  DBili  x   /  AST  248<H>  /  ALT  121<H>  /  AlkPhos  849<H>  08-21    PT/INR - ( 20 Aug 2024 07:20 )   PT: 10.8 sec;   INR: 1.03 ratio         PTT - ( 20 Aug 2024 09:38 )  PTT:26.9 sec   Urinalysis Basic - ( 21 Aug 2024 07:53 )    Color: x / Appearance: x / SG: x / pH: x  Gluc: 63 mg/dL / Ketone: x  / Bili: x / Urobili: x   Blood: x / Protein: x / Nitrite: x   Leuk Esterase: x / RBC: x / WBC x   Sq Epi: x / Non Sq Epi: x / Bacteria: x        RADIOLOGY & ADDITIONAL TESTS:  Studies reviewed.

## 2024-08-22 NOTE — PROGRESS NOTE ADULT - PROBLEM SELECTOR PLAN 5
will continue to follow for goc  case discussed with primary and oncology team  Can be reached by TEAMS [id number] 9-5 Clarisa Mao Any other time please page [phone] if needed

## 2024-08-22 NOTE — PRE PROCEDURE NOTE - PRE PROCEDURE EVALUATION
Interventional Radiology    HPI: 73y Male with gastric adenocarcinoma now with imaging concerning for metastatic disease to the liver. IR to perform image-guided liver mass biopsy.    Allergies: No Known Allergies    Medications (Abx/Cardiac/Anticoagulation/Blood Products)      Data:    T(C): 36.9  HR: 72  BP: 138/75  RR: 16  SpO2: 97%    Exam  General: No acute distress  Chest: Non labored breathing  Abdomen: Non-distended  Extremities: No swelling, warm    -WBC 9.40 / HgB 11.1 / Hct 33.4 / Plt 163  -Na 125 / Cl 93 / BUN 22 / Glucose 88  -K 4.5 / CO2 18 / Cr 0.87  - / Alk Phos 789 / T.Bili 5.9  -INR1.10    Imaging: CT reviewed    Plan:   73y Male with gastric adenocarcinoma now with imaging concerning for metastatic disease to the liver. IR to perform image-guided liver mass biopsy.  -- Relevant imaging and labs were reviewed.   -- Risks, benefits, and alternatives were explained to the patient and informed consent was obtained.   Interventional Radiology    HPI: 73y Male with gastric adenocarcinoma now with imaging concerning for metastatic disease to the liver. IR to perform image-guided liver mass biopsy.    Allergies: No Known Allergies    Medications (Abx/Cardiac/Anticoagulation/Blood Products)      Data:    T(C): 36.9  HR: 72  BP: 138/75  RR: 16  SpO2: 97%    Exam  General: No acute distress  Chest: Non labored breathing  Abdomen: Non-distended  Extremities: No swelling, warm    -WBC 9.40 / HgB 11.1 / Hct 33.4 / Plt 163  -Na 125 / Cl 93 / BUN 22 / Glucose 88  -K 4.5 / CO2 18 / Cr 0.87  - / Alk Phos 789 / T.Bili 5.9  -INR1.10    Imaging: CT reviewed    Plan:   73y Male with gastric adenocarcinoma now with imaging concerning for metastatic disease to the liver. IR to perform image-guided liver mass biopsy.  -- Relevant imaging and labs were reviewed.   -- Risks, benefits, and alternatives were explained to the patient and informed consent was obtained.        Attestation Statement:   I fully participated in the care of this patient.  I have made amendments to the documentation where necessary, and agree with the history and plan as documented by the Student/Resident/Fellow/ACP, with changes as below:   biopsy for histopathologic diagnosis and molecular characterization

## 2024-08-22 NOTE — PROGRESS NOTE ADULT - ATTENDING COMMENTS
Patient seen and evaluted at bedside s/p IR guided liver bx,. Pain improved. More awake and conversant today. Eating at bedside ewithout emesis.       Labs  Na 125--.130  Bilirubin uptrending        #Metastatic gastric cancer  -see above conversation  -IR for liver bx with MMR testing  -if bleeds again consideration of EGD with hemospray.  -poor prognosis overall. If no MMR mutation likely transition to comfort care measures.     #hyponatremia  -hypovolemic hyponatremia, improving with volume.   -encourage PO intake with higher solute.    #transaminitis and hyperbilirubinemai, worsening  -not amenable to stent per advanced GI    #Hx cADg  -given high risk of bleed and active bleed 24 hours prior, will hold ASA    #hypothyroidism'  , continue with synthroid    #neoplastic pain  -given poor po intake, change to morphine 4mg q4hr prn    #Advanced care planning  - Anticipate worsening bilirubin and functional status and doubt patient will have functional capacity to be able to [facility] for treatment even if candidate for immunotherapy, but I hope to be proven wrong.  -continued GOC conversations given high risk of progression of liver failure and death.

## 2024-08-22 NOTE — PROGRESS NOTE ADULT - PROBLEM SELECTOR PLAN 4
Lactate 4.3 on admission, likely 2/2 poor clearance due to mets to liver, normotensive, normal Spo2  Pt with poor PO intake  s/p IVF: 3.8

## 2024-08-22 NOTE — PROGRESS NOTE ADULT - CONVERSATION DETAILS
Initially met with pt and family at bedside with [facility] . pt defers to decision making to family. Addressed the importance of identifying a HCP to help make decisions on his behalf. Patient selects his son Nisha Lockwood, HCP form completed.   Met outside of pt's room, team introduced ourselves and our roles. Family able to explain patient dx with gastric cancer in [facility] and returned [place] the [place] for treatment. Now course has been complicated by GIB.   Dr. Ayala explained the patient has metastatic gastric cancer with spread to the liver and lymph nodes. She shared the tumor burden in the liver is too large for intervention. She shared initially the plan was for inpt chemotherapy but due to the liver dysfunction the pt is not a candidate for chemotherapy as stenting of the liver vessels is not an option due to tumor burden. Dr. Ayala shared there is the option of proceeding with biopsy tomorrow to see if the tumor has a specific genetic mutation that is responsive to immunotherapy. Dr. Ayala shared it is unlikely that the pt has the mutation as there is a <5% chance it comes back positive but stated it is currently his only option for treatment. She shared if the mutation is negative on biopsy then goals should transition to focusing on keeping Mr. Lockwood comfortable.   Dr. Portillo discussed the medical updates including the hyponatremia and inability to be on AC due to GIB. Discussed pt is very high risk for other complications while waiting for biopsy results and ongoing GOC will be important to continue as if complications arise immunotherapy may not be appropriate.   Discussed advanced directives, discussed CPR and intubation and limited benefit in the setting of metastatic cancer. Discussed allowing for a natural passing opposed to artificial support. Elizabeth will speak with his father and family with a plan to follow up with palliative tomorrow. Lu TANG provided to review. Plan is for biopsy tomorrow. Emotional support provided.
Spoke with pt's son regarding GOC conversation. Reviewed advanced directives as MOLST provided to family yesterday to review. Elizabeth states he would like to speak with his mom and uncle regarding changing the code status. Education provided on pt's tenuous status, reviewed limited benefit of CPR or intubation in the setting of metastatic cancer. Shared if pt decompensates we will be faced with this decision emergently and interventions can cause further suffering with limited benefit. He verbalized understanding. Reviewed that although dad defers decision making to him the decision should be made based on what he would want to experience/go through. Elizabeth verbalized understanding and will continue to consider code status and speak with his family.

## 2024-08-22 NOTE — PROGRESS NOTE ADULT - ASSESSMENT
72 y/o M, PMH CAD w/5 stents, HTN, HLD, recent Dx of gastric adenocarcinoma, Yi speaking, presents with burning epigastric pain associated with eating, shortness of breath on exertion x 2 weeks. He was evaluated in [facility] with endoscopy for the abdominal pain, constipation and black stools on 8/12, which showed a bleeding gastric ulcer, biopsy was taken and hemostasis achieved, Biopsy positive for gastric adenocarcinoma. CT imaging revealed metastasis to liver and adjacent lymph nodes. He elected to pursue further care here in the [place], and deferred any treatment in [facility]. Awaiting liver biopsy 8/20.

## 2024-08-22 NOTE — PROGRESS NOTE ADULT - SUBJECTIVE AND OBJECTIVE BOX
SUBJECTIVE AND OBJECTIVE: pt seen and examined at bedside.   Indication for Geriatrics and Palliative Care Services/INTERVAL HPI:    OVERNIGHT EVENTS:    DNR on chart:  Allergies    No Known Allergies    Intolerances    MEDICATIONS  (STANDING):  finasteride 5 milliGRAM(s) Oral daily  levothyroxine 50 MICROGram(s) Oral daily  pantoprazole  Injectable 40 milliGRAM(s) IV Push two times a day  polyethylene glycol 3350 17 Gram(s) Oral daily  senna 2 Tablet(s) Oral at bedtime  tamsulosin 0.4 milliGRAM(s) Oral at bedtime    MEDICATIONS  (PRN):  acetaminophen     Tablet .. 650 milliGRAM(s) Oral every 6 hours PRN Temp greater or equal to 38C (100.4F), Mild Pain (1 - 3)  aluminum hydroxide/magnesium hydroxide/simethicone Suspension 30 milliLiter(s) Oral every 4 hours PRN Dyspepsia  melatonin 3 milliGRAM(s) Oral at bedtime PRN Insomnia  morphine  - Injectable 4 milliGRAM(s) IV Push every 4 hours PRN Severe Pain (7 - 10)  ondansetron Injectable 4 milliGRAM(s) IV Push every 8 hours PRN Nausea and/or Vomiting  oxyCODONE    IR 10 milliGRAM(s) Oral every 4 hours PRN Moderate Pain (4 - 6)  simethicone 80 milliGRAM(s) Chew every 8 hours PRN Heartburn      ITEMS UNCHECKED ARE NOT PRESENT    PRESENT SYMPTOMS: [ ]Unable to self-report - see [ ] CPOT [ ] PAINADS [ ] RDOS  Source if other than patient:  [ ]Family   [ ]Team     Pain:  [ ]yes [ ]no  QOL impact -   Location -                    Aggravating factors -  Quality -  Radiation -  Timing-  Severity (0-10 scale):  Minimal acceptable level (0-10 scale):     CPOT:    https://www.sccm.org/getattachment/[email or web address]-[email or web address]-2m2y-0900v5366d6n/Critical-Care-Pain-Observation-Tool-(CPOT)    PAINAD Score: See PAINAD tool and score below       Dyspnea:                           [ ]Mild [ ]Moderate [ ]Severe    RDOS: See RDOS tool and score below   0 to 2  minimal or no respiratory distress   3  mild distress  4 to 6 moderate distress  >7 severe distress      Anxiety:                             [ ]Mild [ ]Moderate [ ]Severe  Fatigue:                             [ ]Mild [ ]Moderate [ ]Severe  Nausea:                             [ ]Mild [ ]Moderate [ ]Severe  Loss of appetite:              [ ]Mild [ ]Moderate [ ]Severe  Constipation:                    [ ]Mild [ ]Moderate [ ]Severe    PCSSQ[Palliative Care Spiritual Screening Question]   Severity (0-10):  Score of 4 or > indicate consideration of Chaplaincy referral.  Chaplaincy Referral: [ ] yes [ ] refused [ ] following [ ] Deferred     Caregiver [place]? : [ ] yes [ ] no [ ] Deferred [ ] Declined             Social work referral [ ] Patient & Family Centered Care Referral [ ]     Anticipatory Grief present?:  [ ] yes [ ] no  [ ] Deferred                  Social work referral [ ] Chaplaincy Referral [ ]    		  Other Symptoms:  [ ]All other review of systems negative     Palliative Performance Status Version 2:   See PPSv2 tool and score below         PHYSICAL EXAM:  Vital Signs Last 24 Hrs  T(C): 37.1 (22 Aug 2024 14:35), Max: 37.1 (22 Aug 2024 14:35)  T(F): 98.7 (22 Aug 2024 14:35), Max: 98.7 (22 Aug 2024 14:35)  HR: 80 (22 Aug 2024 14:35) (63 - 80)  BP: 125/72 (22 Aug 2024 14:35) (125/72 - 146/86)  BP(mean): 97 (22 Aug 2024 11:45) (90 - 102)  RR: 18 (22 Aug 2024 14:35) (13 - 21)  SpO2: 96% (22 Aug 2024 14:35) (96% - 98%)    Parameters below as of 22 Aug 2024 14:35  Patient On (Oxygen Delivery Method): room air     I&O's Summary    21 Aug 2024 07:01  -  22 Aug 2024 07:00  --------------------------------------------------------  IN: 240 mL / OUT: 0 mL / NET: 240 mL       GENERAL: [ ]Cachexia    [ ]Alert  [ ]Oriented x   [ ]Lethargic  [ ]Unarousable  [ ]Verbal  [ ]Non-Verbal  Behavioral:   [ ]Anxiety  [ ]Delirium [ ]Agitation [ ]Other  HEENT:  [ ]Normal   [ ]Dry mouth   [ ]ET Tube/Trach  [ ]Oral lesions  PULMONARY:   [ ]Clear [ ]Tachypnea  [ ]Audible excessive secretions   [ ]Rhonchi        [ ]Right [ ]Left [ ]Bilateral  [ ]Crackles        [ ]Right [ ]Left [ ]Bilateral  [ ]Wheezing     [ ]Right [ ]Left [ ]Bilateral  [ ]Diminished BS [ ] Right [ ]Left [ ]Bilateral  CARDIOVASCULAR:    [ ]Regular [ ]Irregular [ ]Tachy  [ ]Galdino [ ]Murmur [ ]Other  GASTROINTESTINAL:  [ ]Soft  [ ]Distended   [ ]+BS  [ ]Non tender [ ]Tender  [ ]Other [ ]PEG [ ]OGT/ NGT   Last BM:   GENITOURINARY:  [ ]Normal [ ]Incontinent   [ ]Oliguria/Anuria   [ ]Morales  MUSCULOSKELETAL:   [ ]Normal   [ ]Weakness  [ ]Bed/Wheelchair bound [ ]Edema  NEUROLOGIC:   [ ]No focal deficits  [ ] Cognitive impairment  [ ] Dysphagia [ ]Dysarthria [ ] Paresis [ ]Other   SKIN:   [ ]Normal  [ ]Rash  [ ]Other  [ ]Pressure ulcer(s) [ ]y [ ]n present on admission    CRITICAL CARE:  [ ]Shock Present  [ ]Septic [ ]Cardiogenic [ ]Neurologic [ ]Hypovolemic  [ ]Vasopressors [ ]Inotropes  [ ]Respiratory failure present [ ]Mechanical Ventilation [ ]Non-invasive ventilatory support [ ]High-Flow   [ ]Acute  [ ]Chronic [ ]Hypoxic  [ ]Hypercarbic [ ]Other  [ ]Other organ failure     LABS:                        11.1   9.40  )-----------( 163      ( 22 Aug 2024 04:26 )             33.4   08-22    130<L>  |  95<L>  |  19  ----------------------------<  79  4.4   |  21<L>  |  0.85    Ca    9.2      22 Aug 2024 15:48  Phos  2.5     08-22  Mg     2.2     08-22    TPro  6.1  /  Alb  2.7<L>  /  TBili  5.9<H>  /  DBili  x   /  AST  204<H>  /  ALT  107<H>  /  AlkPhos  789<H>  08-22  PT/INR - ( 22 Aug 2024 04:26 )   PT: 11.5 sec;   INR: 1.10 ratio         PTT - ( 22 Aug 2024 04:26 )  PTT:25.2 sec    Urinalysis Basic - ( 22 Aug 2024 15:48 )    Color: x / Appearance: x / SG: x / pH: x  Gluc: 79 mg/dL / Ketone: x  / Bili: x / Urobili: x   Blood: x / Protein: x / Nitrite: x   Leuk Esterase: x / RBC: x / WBC x   Sq Epi: x / Non Sq Epi: x / Bacteria: x      RADIOLOGY & ADDITIONAL STUDIES:    Protein Calorie Malnutrition Present: [ ]mild [ ]moderate [ ]severe [ ]underweight [ ]morbid obesity  https://www.andeal.org/vault/2440/web/files/ONC/Table_Clinical%20Characteristics%20to%20Document%20Malnutrition-White%20JV%20et%20al%202012.pdf    Height (cm): 165.1 (08-22-24 @ 08:55)  Weight (kg): 68 (08-22-24 @ 08:55)  BMI (kg/m2): 24.9 (08-22-24 @ 08:55)    [ ]PPSV2 < or = 30%  [ ]significant weight loss [ ]poor nutritional intake [ ]anasarca[ ]Artificial Nutrition    Other REFERRALS:  [ ]Hospice  [ ]Child Life  [ ]Social Work  [ ]Case management [ ]Holistic Therapy     Goals of Care Document: SUBJECTIVE AND OBJECTIVE: pt seen and examined at bedside.   Indication for Geriatrics and Palliative Care Services/INTERVAL HPI: GOC     OVERNIGHT EVENTS: no acute events o/n     DNR on chart:  Allergies    No Known Allergies    Intolerances    MEDICATIONS  (STANDING):  finasteride 5 milliGRAM(s) Oral daily  levothyroxine 50 MICROGram(s) Oral daily  pantoprazole  Injectable 40 milliGRAM(s) IV Push two times a day  polyethylene glycol 3350 17 Gram(s) Oral daily  senna 2 Tablet(s) Oral at bedtime  tamsulosin 0.4 milliGRAM(s) Oral at bedtime    MEDICATIONS  (PRN):  acetaminophen     Tablet .. 650 milliGRAM(s) Oral every 6 hours PRN Temp greater or equal to 38C (100.4F), Mild Pain (1 - 3)  aluminum hydroxide/magnesium hydroxide/simethicone Suspension 30 milliLiter(s) Oral every 4 hours PRN Dyspepsia  melatonin 3 milliGRAM(s) Oral at bedtime PRN Insomnia  morphine  - Injectable 4 milliGRAM(s) IV Push every 4 hours PRN Severe Pain (7 - 10)  ondansetron Injectable 4 milliGRAM(s) IV Push every 8 hours PRN Nausea and/or Vomiting  oxyCODONE    IR 10 milliGRAM(s) Oral every 4 hours PRN Moderate Pain (4 - 6)  simethicone 80 milliGRAM(s) Chew every 8 hours PRN Heartburn      ITEMS UNCHECKED ARE NOT PRESENT    PRESENT SYMPTOMS: [ ]Unable to self-report - see [ ] CPOT [ ] PAINADS [ ] RDOS  Source if other than patient:  [ ]Family   [ ]Team     Pain:  [ ]yes [ x]no  QOL impact -   Location -                    Aggravating factors -  Quality -  Radiation -  Timing-  Severity (0-10 scale):  Minimal acceptable level (0-10 scale):     CPOT:    https://www.sccm.org/getattachment/[email or web address]-3z3t-6p0d-6933c4566u4y/Critical-Care-Pain-Observation-Tool-(CPOT)    PAINAD Score: See PAINAD tool and score below       Dyspnea:                           [ ]Mild [ ]Moderate [ ]Severe None     RDOS: See RDOS tool and score below   0 to 2  minimal or no respiratory distress   3  mild distress  4 to 6 moderate distress  >7 severe distress      Anxiety:                             [ ]Mild [ ]Moderate [ ]Severe  Fatigue:                             [ ]Mild [ ]Moderate [ ]Severe  Nausea:                             [ ]Mild [ ]Moderate [ ]Severe  Loss of appetite:              [ ]Mild [ ]Moderate [ ]Severe  Constipation:                    [ ]Mild [ ]Moderate [ ]Severe    PCSSQ[Palliative Care Spiritual Screening Question]   Severity (0-10):  Score of 4 or > indicate consideration of Chaplaincy referral.  Chaplaincy Referral: [x ] yes [ ] refused [ x] following [ ] Deferred     Caregiver [place]? : [ ] yes [ ] no [ ] Deferred [ ] Declined             Social work referral [ ] Patient & Family Centered Care Referral [ ]     Anticipatory Grief present?:  [ ] yes [ ] no  [ ] Deferred                  Social work referral [ ] Chaplaincy Referral [ ]    		  Other Symptoms:  [x ]All other review of systems negative     Palliative Performance Status Version 2:   See PPSv2 tool and score below         PHYSICAL EXAM:  Vital Signs Last 24 Hrs  T(C): 37.1 (22 Aug 2024 14:35), Max: 37.1 (22 Aug 2024 14:35)  T(F): 98.7 (22 Aug 2024 14:35), Max: 98.7 (22 Aug 2024 14:35)  HR: 80 (22 Aug 2024 14:35) (63 - 80)  BP: 125/72 (22 Aug 2024 14:35) (125/72 - 146/86)  BP(mean): 97 (22 Aug 2024 11:45) (90 - 102)  RR: 18 (22 Aug 2024 14:35) (13 - 21)  SpO2: 96% (22 Aug 2024 14:35) (96% - 98%)    Parameters below as of 22 Aug 2024 14:35  Patient On (Oxygen Delivery Method): room air     I&O's Summary    21 Aug 2024 07:01  -  22 Aug 2024 07:00  --------------------------------------------------------  IN: 240 mL / OUT: 0 mL / NET: 240 mL       GENERAL: [ ]Cachexia    [x ]Alert  [ x]Oriented x 3  [ ]Lethargic  [ ]Unarousable  [ ]Verbal  [ ]Non-Verbal  Behavioral:   [ ]Anxiety  [ ]Delirium [ ]Agitation [ ]Other  HEENT:  [ ]Normal   [x ]Dry mouth   [ ]ET Tube/Trach  [ ]Oral lesions  PULMONARY:   [ ]Clear [ ]Tachypnea  [ ]Audible excessive secretions   [ ]Rhonchi        [ ]Right [ ]Left [ ]Bilateral  [ ]Crackles        [ ]Right [ ]Left [ ]Bilateral  [ ]Wheezing     [ ]Right [ ]Left [ ]Bilateral  [ x]Diminished BS [ ] Right [ ]Left [ x]Bilateral  CARDIOVASCULAR:    [x ]Regular [ ]Irregular [ ]Tachy  [ ]Galdino [ ]Murmur [ ]Other  GASTROINTESTINAL:  [ ]Soft  [ ]Distended   [ ]+BS  [ ]Non tender [ x]Tender  [ ]Other [ ]PEG [ ]OGT/ NGT   Last BM:   GENITOURINARY:  [x ]Normal [ ]Incontinent   [ ]Oliguria/Anuria   [ ]Morales  MUSCULOSKELETAL:   [ ]Normal   [x ]Weakness  [ ]Bed/Wheelchair bound [ ]Edema  NEUROLOGIC:   [x ]No focal deficits  [ ] Cognitive impairment  [ ] Dysphagia [ ]Dysarthria [ ] Paresis [ ]Other   SKIN:   [ ]Normal  [ ]Rash  [ ]Other  [ ]Pressure ulcer(s) [ ]y [ ]n present on admission    CRITICAL CARE:  [ ]Shock Present  [ ]Septic [ ]Cardiogenic [ ]Neurologic [ ]Hypovolemic  [ ]Vasopressors [ ]Inotropes  [ ]Respiratory failure present [ ]Mechanical Ventilation [ ]Non-invasive ventilatory support [ ]High-Flow   [ ]Acute  [ ]Chronic [ ]Hypoxic  [ ]Hypercarbic [ ]Other  [ ]Other organ failure     LABS:                        11.1   9.40  )-----------( 163      ( 22 Aug 2024 04:26 )             33.4   08-22    130<L>  |  95<L>  |  19  ----------------------------<  79  4.4   |  21<L>  |  0.85    Ca    9.2      22 Aug 2024 15:48  Phos  2.5     08-22  Mg     2.2     08-22    TPro  6.1  /  Alb  2.7<L>  /  TBili  5.9<H>  /  DBili  x   /  AST  204<H>  /  ALT  107<H>  /  AlkPhos  789<H>  08-22  PT/INR - ( 22 Aug 2024 04:26 )   PT: 11.5 sec;   INR: 1.10 ratio         PTT - ( 22 Aug 2024 04:26 )  PTT:25.2 sec    Urinalysis Basic - ( 22 Aug 2024 15:48 )    Color: x / Appearance: x / SG: x / pH: x  Gluc: 79 mg/dL / Ketone: x  / Bili: x / Urobili: x   Blood: x / Protein: x / Nitrite: x   Leuk Esterase: x / RBC: x / WBC x   Sq Epi: x / Non Sq Epi: x / Bacteria: x      RADIOLOGY & ADDITIONAL STUDIES:  < from: MR Abdomen w/ IV Cont (08.18.24 @ 11:29) >    ACC: [id number] EXAM:  MR ABDOMEN IC   ORDERED BY: DADA TURNER     PROCEDURE DATE:  08/18/2024          INTERPRETATION:  CLINICAL INFORMATION: Metastatic gastric cancer with   liver metastasis, bilirubin 1.5, rule out dominant stricture    COMPARISON: CT from 8/16/2024.    CONTRAST/COMPLICATIONS:  IV Contrast: Gadavist  7 cc administered   .5 cc discarded  Oral Contrast: NONE  Complications: None reported at time of study completion    PROCEDURE:  MRI of the abdomen was performed.  MRCP was performed.    FINDINGS:  LOWER CHEST: Within normal limits.    LIVER: Innumerable liver lesions are visible throughout the liver which   is similar to the recent prior CT and consistent with known metastasis.   The larger lesions in the right liver isapproximately 5 cm on image 1 of   series 11. There is mild compression and displacement of the hepatic   veins however the liver vasculature is patent..  BILE DUCTS: The MRCP images demonstrate significant compression and   distortion of the intrahepatic bile ducts, best seen on images 7 through   9 of series 12 with pronounced compression of the hilar region of the   bile ducts on image 8 of series 12 and associated mild left-sided   intrahepatic ductal dilatation, also best seen on image 8 of series 12.   The left hepatic duct is dilated to approximately 5 mm on image 34 of   series 29. The common hepatic duct and the common bile duct are within   normal limits measuring approximately 4 mm, best seen on image 41 of   series 29. No filling defects or strictures visible within the CBD.  GALLBLADDER: Mildly contracted gallbladder is visible. No evidence for   cholelithiasis or cholecystitis..  SPLEEN: Within normal limits.  PANCREAS: Mildly prominent and somewhat edematous pancreas is visible on   the T2-weighted sequences. Mild soft tissue stranding around the pancreas   and small amount of free fluid/ascites in the upper abdomen; these   findings are nonspecific however mild pancreatitis cannot be excluded. No   pancreatic ductal dilatation visible..  ADRENALS: Within normal limits.  KIDNEYS/URETERS: Small bilateral renal cysts. No evidence for   hydronephrosis.    VISUALIZED PORTIONS:  BOWEL: A gastric mass is visible on image 5 of series 11, approximately   measuring 2.7 cm..  PERITONEUM: Small amount of ascites is visible in the upper abdomen,   mainly around the liver and in the left paracolic gutter..  VESSELS: Within normal limits.  RETROPERITONEUM/LYMPH NODES: No lymphadenopathy.  ABDOMINAL WALL: Within normal limits.  BONES: Within normal limits.    IMPRESSION:  Innumerable large liver metastasis are noted, causing significant   compression and distortion of the intrahepatic bile ducts; the hilar   region of the bowel demonstrates somewhat narrowed and there is   associated left sided intrahepatic biliary dilatation with a left hepatic   duct measuring up to 5 mm.    The common hepatic duct and the common bile duct are within normal limits   and measures up to 4 mm. No evidence for choledocholithiasis or a CBD   stricture.    No evidence for cholelithiasis or cholecystitis.    Known gastric mass present.    Mildly prominent and somewhat edematous pancreas is visible with mild   soft tissue stranding around the pancreas and small amount of ascites;   thesefindings are nonspecific however mild acute pancreatitis cannot be   entirely excluded.    --- End of Report ---            ISAI MCCRACKEN MD; Attending Radiologist  This document has been electronically signed. Aug 18 2024  2:21PM    < end of copied text >    Protein Calorie Malnutrition Present: [ ]mild [ ]moderate [ ]severe [ ]underweight [ ]morbid obesity  https://www.andeal.org/vault/2440/web/files/ONC/Table_Clinical%20Characteristics%20to%20Document%20Malnutrition-White%20JV%20et%20al%202012.pdf    Height (cm): 165.1 (08-22-24 @ 08:55)  Weight (kg): 68 (08-22-24 @ 08:55)  BMI (kg/m2): 24.9 (08-22-24 @ 08:55)    [ x]PPSV2 < or = 30%  [ ]significant weight loss [ ]poor nutritional intake [ ]anasarca[ ]Artificial Nutrition    Other REFERRALS:  [ ]Hospice  [ ]Child Life  [ ]Social Work  [ ]Case management [ ]Holistic Therapy     Goals of Care Document:

## 2024-08-22 NOTE — PROGRESS NOTE ADULT - PROBLEM SELECTOR PLAN 3
Bilirubin Total: 5.0 <- 4.2 <- 3.3  Alkaline Phosphatase: 849 <- 797 <- 785  AST: 248 <- 180 <- 158  ALT: 121 <- 103 <- 91  Suspected to be due to metastatic infiltration from multiple mets  Trending upwards.    -GI and Onc following  -trend CMP Bilirubin Total: 5.9 <- 5.0 <- 4.2 <- 3.3  Alkaline Phosphatase: 789 <- 849 <- 797 <- 785  AST: 204 <- 248 <- 180 <- 158  ALT: 107 <- 121 <- 103 <- 91  Suspected to be due to metastatic infiltration from multiple mets  Overall trend is upwards.    -GI and Onc following  -trend CMP

## 2024-08-22 NOTE — PROGRESS NOTE ADULT - PROBLEM SELECTOR PLAN 6
2/2 thrombophlebitis. Small 3cm circular area of redness with associated induration and TTP over dorsum of L hand, no streaking, no spreading redness, no fluctuance, Sensorimotor function of L hand intact. Reports frequent needle sticks in L hand during recent hospitalization in [facility].  -L hand U/S of soft tissue neg for abscess  -warm compresses prn

## 2024-08-22 NOTE — PROGRESS NOTE ADULT - SUBJECTIVE AND OBJECTIVE BOX
***************************************************************  Bony Alexander, PGY 1   Internal Medicine   ***************************************************************    RAJENDRA VASQUEZ  73y  MRN: [id number]    Patient is a 73y old  Male who presents with a chief complaint of Gastric adenocarcinoma (21 Aug 2024 16:32)      Subjective: no events ON. Denies fever, CP, SOB, abn pain, N/V, dysuria. Tolerating diet.      MEDICATIONS  (STANDING):  finasteride 5 milliGRAM(s) Oral daily  levothyroxine 50 MICROGram(s) Oral daily  pantoprazole  Injectable 40 milliGRAM(s) IV Push two times a day  polyethylene glycol 3350 17 Gram(s) Oral daily  senna 2 Tablet(s) Oral at bedtime  sodium chloride 0.9% Bolus 500 milliLiter(s) IV Bolus once  tamsulosin 0.4 milliGRAM(s) Oral at bedtime    MEDICATIONS  (PRN):  acetaminophen     Tablet .. 650 milliGRAM(s) Oral every 6 hours PRN Temp greater or equal to 38C (100.4F), Mild Pain (1 - 3)  aluminum hydroxide/magnesium hydroxide/simethicone Suspension 30 milliLiter(s) Oral every 4 hours PRN Dyspepsia  melatonin 3 milliGRAM(s) Oral at bedtime PRN Insomnia  morphine  - Injectable 4 milliGRAM(s) IV Push every 4 hours PRN Severe Pain (7 - 10)  ondansetron Injectable 4 milliGRAM(s) IV Push every 8 hours PRN Nausea and/or Vomiting  oxyCODONE    IR 10 milliGRAM(s) Oral every 4 hours PRN Moderate Pain (4 - 6)  simethicone 80 milliGRAM(s) Chew every 8 hours PRN Heartburn      Objective:    Vitals: Vital Signs Last 24 Hrs  T(C): 36.9 (08-22-24 @ 08:55), Max: 36.9 (08-21-24 @ 22:46)  T(F): 98.4 (08-22-24 @ 08:35), Max: 98.5 (08-21-24 @ 22:46)  HR: 72 (08-22-24 @ 08:55) (70 - 73)  BP: 138/75 (08-22-24 @ 08:55) (127/71 - 148/78)  BP(mean): 91 (08-22-24 @ 08:55) (91 - 91)  RR: 16 (08-22-24 @ 08:55) (16 - 18)  SpO2: 97% (08-22-24 @ 08:55) (96% - 97%)            I&O's Summary    21 Aug 2024 07:01  -  22 Aug 2024 07:00  --------------------------------------------------------  IN: 240 mL / OUT: 0 mL / NET: 240 mL        PHYSICAL EXAM:  GENERAL: NAD  HEAD:  Atraumatic, Normocephalic  EYES: EOMI, conjunctiva and sclera clear  CHEST/LUNG: Clear to auscultation bilaterally; No rales, rhonchi, wheezing, or rubs  HEART: Regular rate and rhythm; No murmurs, rubs, or gallops  ABDOMEN: Soft, Nontender, Nondistended;   SKIN: No rashes or lesions  NERVOUS SYSTEM:  Alert & Oriented X3, no focal deficits    LABS:  08-22    125<L>  |  93<L>  |  22  ----------------------------<  88  4.5   |  18<L>  |  0.87  08-21    125<L>  |  90<L>  |  17  ----------------------------<  90  4.7   |  19<L>  |  0.90  08-21    129<L>  |  91<L>  |  23  ----------------------------<  63<L>  5.0   |  19<L>  |  0.94    Ca    9.0      22 Aug 2024 04:26  Ca    9.3      21 Aug 2024 23:38  Ca    9.4      21 Aug 2024 07:53  Phos  2.5     08-22  Mg     2.2     08-22    TPro  6.1  /  Alb  2.7<L>  /  TBili  5.9<H>  /  DBili  x   /  AST  204<H>  /  ALT  107<H>  /  AlkPhos  789<H>  08-22  TPro  6.9  /  Alb  3.1<L>  /  TBili  5.0<H>  /  DBili  x   /  AST  248<H>  /  ALT  121<H>  /  AlkPhos  849<H>  08-21  TPro  7.0  /  Alb  3.2<L>  /  TBili  4.2<H>  /  DBili  x   /  AST  180<H>  /  ALT  103<H>  /  AlkPhos  797<H>  08-20      PT/INR - ( 22 Aug 2024 04:26 )   PT: 11.5 sec;   INR: 1.10 ratio         PTT - ( 22 Aug 2024 04:26 )  PTT:25.2 sec              Urinalysis Basic - ( 22 Aug 2024 04:26 )    Color: x / Appearance: x / SG: x / pH: x  Gluc: 88 mg/dL / Ketone: x  / Bili: x / Urobili: x   Blood: x / Protein: x / Nitrite: x   Leuk Esterase: x / RBC: x / WBC x   Sq Epi: x / Non Sq Epi: x / Bacteria: x                              11.1   9.40  )-----------( 163      ( 22 Aug 2024 04:26 )             33.4                         12.1   11.37 )-----------( 194      ( 21 Aug 2024 07:54 )             37.5                         12.0   12.78 )-----------( 224      ( 20 Aug 2024 07:20 )             36.1     CAPILLARY BLOOD GLUCOSE      POCT Blood Glucose.: 93 mg/dL (21 Aug 2024 17:36)      RADIOLOGY & ADDITIONAL TESTS:    Imaging Personally Reviewed:  [x ] YES  [ ] NO    Consultants involved in case:   Consultant(s) Notes Reviewed:  [ x] YES  [ ] NO:   Care Discussed with Consultants/Other Providers [x ] YES  [ ] NO         ***************************************************************  Bony Alexander, PGY 1   Internal Medicine   ***************************************************************    RAJENDRA VASQUEZ  73y  MRN: [id number]    Patient is a 73y old  Male who presents with a chief complaint of Gastric adenocarcinoma (21 Aug 2024 16:32)      Subjective: no events ON. He reports abdominal fullness, Pt Denies fever, CP, SOB, N/V, dysuria.     MEDICATIONS  (STANDING):  finasteride 5 milliGRAM(s) Oral daily  levothyroxine 50 MICROGram(s) Oral daily  pantoprazole  Injectable 40 milliGRAM(s) IV Push two times a day  polyethylene glycol 3350 17 Gram(s) Oral daily  senna 2 Tablet(s) Oral at bedtime  sodium chloride 0.9% Bolus 500 milliLiter(s) IV Bolus once  tamsulosin 0.4 milliGRAM(s) Oral at bedtime    MEDICATIONS  (PRN):  acetaminophen     Tablet .. 650 milliGRAM(s) Oral every 6 hours PRN Temp greater or equal to 38C (100.4F), Mild Pain (1 - 3)  aluminum hydroxide/magnesium hydroxide/simethicone Suspension 30 milliLiter(s) Oral every 4 hours PRN Dyspepsia  melatonin 3 milliGRAM(s) Oral at bedtime PRN Insomnia  morphine  - Injectable 4 milliGRAM(s) IV Push every 4 hours PRN Severe Pain (7 - 10)  ondansetron Injectable 4 milliGRAM(s) IV Push every 8 hours PRN Nausea and/or Vomiting  oxyCODONE    IR 10 milliGRAM(s) Oral every 4 hours PRN Moderate Pain (4 - 6)  simethicone 80 milliGRAM(s) Chew every 8 hours PRN Heartburn      Objective:    Vitals: Vital Signs Last 24 Hrs  T(C): 36.9 (08-22-24 @ 08:55), Max: 36.9 (08-21-24 @ 22:46)  T(F): 98.4 (08-22-24 @ 08:35), Max: 98.5 (08-21-24 @ 22:46)  HR: 72 (08-22-24 @ 08:55) (70 - 73)  BP: 138/75 (08-22-24 @ 08:55) (127/71 - 148/78)  BP(mean): 91 (08-22-24 @ 08:55) (91 - 91)  RR: 16 (08-22-24 @ 08:55) (16 - 18)  SpO2: 97% (08-22-24 @ 08:55) (96% - 97%)            I&O's Summary    21 Aug 2024 07:01  -  22 Aug 2024 07:00  --------------------------------------------------------  IN: 240 mL / OUT: 0 mL / NET: 240 mL        PHYSICAL EXAM:  HEAD:  Atraumatic, Normocephalic  EYES: EOMI, pupils PERRL, +scleral icterus.  ENT: dry mucous membranes  CHEST/LUNG: Clear to auscultation bilaterally; No rales, rhonchi, wheezing, or rubs  HEART: Regular rate and rhythm; No murmurs, rubs, or gallops  ABDOMEN: Epigastric and RUQ TTP, abdomen soft, nondistended, bowel sounds present.  EXT: L Hand: Small 3cm circular area of redness with associated induration and TTP over dorsum of L hand, no streaking, no spreading redness, no fluctuance.                      Sensorimotor function of L hand intact.           LE: trace LE b/l pitting edema, no calf tenderness.   SKIN: Diffuse jaundice  NERVOUS SYSTEM:  Alert & Oriented X3, no focal deficits      LABS:  08-22    125<L>  |  93<L>  |  22  ----------------------------<  88  4.5   |  18<L>  |  0.87  08-21    125<L>  |  90<L>  |  17  ----------------------------<  90  4.7   |  19<L>  |  0.90  08-21    129<L>  |  91<L>  |  23  ----------------------------<  63<L>  5.0   |  19<L>  |  0.94    Ca    9.0      22 Aug 2024 04:26  Ca    9.3      21 Aug 2024 23:38  Ca    9.4      21 Aug 2024 07:53  Phos  2.5     08-22  Mg     2.2     08-22    TPro  6.1  /  Alb  2.7<L>  /  TBili  5.9<H>  /  DBili  x   /  AST  204<H>  /  ALT  107<H>  /  AlkPhos  789<H>  08-22  TPro  6.9  /  Alb  3.1<L>  /  TBili  5.0<H>  /  DBili  x   /  AST  248<H>  /  ALT  121<H>  /  AlkPhos  849<H>  08-21  TPro  7.0  /  Alb  3.2<L>  /  TBili  4.2<H>  /  DBili  x   /  AST  180<H>  /  ALT  103<H>  /  AlkPhos  797<H>  08-20      PT/INR - ( 22 Aug 2024 04:26 )   PT: 11.5 sec;   INR: 1.10 ratio         PTT - ( 22 Aug 2024 04:26 )  PTT:25.2 sec              Urinalysis Basic - ( 22 Aug 2024 04:26 )    Color: x / Appearance: x / SG: x / pH: x  Gluc: 88 mg/dL / Ketone: x  / Bili: x / Urobili: x   Blood: x / Protein: x / Nitrite: x   Leuk Esterase: x / RBC: x / WBC x   Sq Epi: x / Non Sq Epi: x / Bacteria: x                              11.1   9.40  )-----------( 163      ( 22 Aug 2024 04:26 )             33.4                         12.1   11.37 )-----------( 194      ( 21 Aug 2024 07:54 )             37.5                         12.0   12.78 )-----------( 224      ( 20 Aug 2024 07:20 )             36.1     CAPILLARY BLOOD GLUCOSE      POCT Blood Glucose.: 93 mg/dL (21 Aug 2024 17:36)      RADIOLOGY & ADDITIONAL TESTS:    Imaging Personally Reviewed:  [x ] YES  [ ] NO    Consultants involved in case:   Consultant(s) Notes Reviewed:  [ x] YES  [ ] NO:   Care Discussed with Consultants/Other Providers [x ] YES  [ ] NO

## 2024-08-22 NOTE — PROGRESS NOTE ADULT - PROBLEM SELECTOR PLAN 2
125 this AM, 128 on admission.  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 Repeat Sodium at 129 125 this AM, 129 yesterday, 128 on admission.  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   Na improved ffrom 127 on 8/20 to 129 (8/21) yesterday after treatment with Hypertonic saline on 8/20.   Received LR maintenance fluids during the day yesterday and Hypertonic saline overnight, with decrease in sodium from 129 to 125.   Additional 500mL NS bolus given today, completed in afternoon, repeat BMP drawn.  Pt initially on DASH diet, changed to regular halal diet to improve oral salt intake.    -f/u BMP from afternoon 8/22. Will trend BMP.  -f/u serum osm in AM  -fluid restrict 1.5L/day

## 2024-08-22 NOTE — PROGRESS NOTE ADULT - ASSESSMENT
74 y/o M, PMH CAD w/5 stents, HTN, HLD, recent Dx of gastric adenocarcinoma, Estonian speaking, presents with burning epigastric pain associated with eating, shortness of breath on exertion x 2 weeks. He was evaluated in [facility] with endoscopy for the abdominal pain, constipation and black stools on 8/12, which showed a bleeding gastric ulcer, biopsy was taken and hemostasis achieved, Biopsy positive for gastric adenocarcinoma. CT imaging revealed metastasis to liver and adjacent lymph nodes. He elected to pursue further care here in the [place], and deferred any treatment in [facility]. Awaiting liver biopsy 8/20. (Taken from internal medicine note). Palliative consulted for GOC.

## 2024-08-22 NOTE — PROGRESS NOTE ADULT - PROBLEM SELECTOR PLAN 1
Biopsy proven in [facility] from endoscopy on 8/12/24  Coffee ground emesis yesterday x 4-5 episodes, 1 episode of small black stool last night.  EGD by GI canceled by anesthesia due to HypoNa.  After discussion with Onc and GI, plan to hold additional procedures until discussion held with family.     -Onc following, recs given  -GI following, recs given  -Family meeting to be held regarding next steps on 8/21 at 3pm. Biopsy proven in [facility] from endoscopy on 8/12/24.   Hospital course with Episodes of coffee ground emesis on 8/19, and dark stools 8/19-8/20, associated with increasing Tbili, transaminanses, and ALP levels.    GOC discussion with Onc, palliative care, and family on 8/21, family agrees to biopsy either via IR guided biopsy of liver mets or EGD biopsy of primary gastric tumor, to genotype tumor to assess any possible immunomodulatory therapy. Code status discussion started, palliative care  to follow up with family today regarding code status/molst.   Pt went for IR guided biopsy of liver mets this AM.    -Onc following  -GI following,  -Palliative Care following  - F/U biopsy results.

## 2024-08-22 NOTE — PROCEDURE NOTE - PROCEDURE FINDINGS AND DETAILS
Successful ultrasound-guided right liver mass biopsy.  4x 18G cores taken.  Tract embolization performed with gelfoam.  Patient tolerated the procedure well with no immediate complication.

## 2024-08-22 NOTE — PROGRESS NOTE ADULT - PROBLEM SELECTOR PLAN 1
appreciate heme/onc input  biopsy completed today   pain well controlled on exam, 0 prns  pain education provided to pt and family, pt identifying pain as not severe enough to require pain medication, if concerned pt not receiving needed pain medication can consider initiating q8 ATC with the option to refuse.

## 2024-08-23 NOTE — PROGRESS NOTE ADULT - ASSESSMENT
73 year old male, PMH HTN, HLD, CAD w/ stents, presenting with SOB, recent diagnosis of stomach adenocarcinoma in [facility] Oncology consulted for further recommendations    #Newly diagnosed metastatic gastric adenocarcinoma in [facility]  #Transaminitis/hyperbilirubinemia likely 2/2 to infiltrative disease  - Patient recently visiting family in [facility], had full workup performed there for epigastric pain and melena, including egd with pathology showing adenocarcinoma and imaging showing mets to liver and surround adenopathy.. Patient complains of shortness of breath for the past week worse with exertion and for the past 2 days complaining of nausea and epigastric pain with the eating with subjective 5kg weight loss over past few days. In the ED, Hgb 12.8, Bili 1.5, Alk Phos 978, , ALT 71, lactate 5.4. Reports last screening colonoscopy was 2011 and 2018. No prior endoscopy apart from this past EGD in [facility] Family brought reports from [facility] at beside   - CTA negative for PE or pulmonary mets, show mass within the wall of the lesser curvature of the gastric body   representing the patient's gastric malignancy, many hepatics mets and gastrohepatic lymphadenopathy.   - MR abd/pelvis: Innumerable large liver metastasis are noted, causing significant compression and distortion of the intrahepatic bile ducts; the hilar region of the bowel demonstrates somewhat narrowed and there is associated left sided intrahepatic biliary dilatation with a left hepatic duct measuring up to 5 mm.The common hepatic duct and the common bile duct are within normal limits and measures up to 4 mm. No evidence for choledocholithiasis or a CBD stricture.No evidence for cholelithiasis or cholecystitis. Known gastric mass present.  - Pending IR guided liver biopsy 8/20 however given hyponatremia and coffee ground emesis was deferred as initially was considering EGD via GI however EGD differed at this time   - Given uptrending bilirubin levels and MR findings, discussed with advanced GI, less likely to be 2/2 to stricture and no benefit from palliative stent.   - Appreciate palliative care eval.   -8/21/24: [facility] discussion occurred alongside the palliative, primary team, patients son and uncle. Initially, inpatient palliative chemotherapy was considered as a treatment option. However, given the current clinical scenario, including the extensive metastatic disease, rising bilirubin levels, and the patient’s overall declining functional status, we now have concerns that chemotherapy may not be beneficial. In fact, administering chemotherapy at this stage could potentially exacerbate the patient’s condition and negatively impact their quality of life. If the patient remains stable, we recommend pursuing a biopsy to determine MMR status (although there is a very low percentage of this being positive) and to assess eligibility for immunotherapy. If not eligible, we would strongly recommend hospice care.  S/p IR-guided liver biopsy on 8/22/24: Emailed to expedite MMR status; it is likely that results will be available only by mid to late next week. Discussed with the family the very low likelihood of this being positive and receiving immunotherapy.  - Adjusted pain regimen, oxycontin 10 mg q4 prn, bowel regimen per primary team   - HIV, hepatitis panel negative  - Advised on physical therapy and nutrition optimization   - Oncology team to follow    Note not finalized until signed by attending.   Please do not hesitate to page with questions.     Luh Masters MD  Hematology/Oncology Fellow PGY5  Available on [employer] Teams   Pager: [phone]  For weekends and evenings (5 pm - 8 am), please page fellow on call

## 2024-08-23 NOTE — PROGRESS NOTE ADULT - ASSESSMENT
Bedside and Verbal shift change report given to Dandre Joseph (oncoming nurse) by Mirna Carr (offgoing nurse).  Report included the following information SBAR, Kardex, Intake/Output, MAR, Recent Results, and Cardiac Rhythm SR . 72 y/o M, PMH CAD w/5 stents, HTN, HLD, recent Dx of gastric adenocarcinoma, Pashto speaking, presents with burning epigastric pain associated with eating, shortness of breath on exertion x 2 weeks. [facility] for the abdominal pain, constipation and black stools on 8/12, which showed a bleeding gastric ulcer, biopsy was taken and hemostasis achieved, Biopsy positive for gastric adenocarcinoma. CT imaging revealed metastasis to liver and adjacent lymph nodes. He elected to pursue further care here in the [place], and deferred any treatment [facility]. Awaiting liver biopsy 8/20.

## 2024-08-23 NOTE — PROGRESS NOTE ADULT - PROBLEM SELECTOR PLAN 6
2/2 thrombophlebitis. Small 3cm circular area of redness with associated induration and TTP over dorsum of L hand, no streaking, no spreading redness, no fluctuance, Sensorimotor function of L hand intact. Reports frequent needle sticks in L hand during recent hospitalization in [facility].  -L hand U/S of soft tissue neg for abscess  -warm compresses prn

## 2024-08-23 NOTE — PROGRESS NOTE ADULT - PROBLEM SELECTOR PLAN 2
125 this AM, 129 yesterday, 128 on admission.  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   Na improved ffrom 127 on 8/20 to 129 (8/21) yesterday after treatment with Hypertonic saline on 8/20.   Received LR maintenance fluids during the day yesterday and Hypertonic saline overnight, with decrease in sodium from 129 to 125.   Additional 500mL NS bolus given today, completed in afternoon, repeat BMP drawn.  Pt initially on DASH diet, changed to regular halal diet to improve oral salt intake.    -f/u BMP from afternoon 8/22. Will trend BMP.  -f/u serum osm in AM  -fluid restrict 1.5L/day 128 today, 130 yesterday, 128 on admission.  Serum Osm: 270, calculated serum osm 265.  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     -maintenance fluids with NS, 50mL/hr x 10 hours.  -regular halal diet, no salt restrictions  -trend BMP.  -fluid restrict 1.5L/day

## 2024-08-23 NOTE — PROGRESS NOTE ADULT - PROBLEM SELECTOR PLAN 11
DVT ppx: Lovenox: preferred in malignancy  Diet: Regular, Halal, 1.5L fluid restriction  Code: Full Code currently

## 2024-08-23 NOTE — PROGRESS NOTE ADULT - SUBJECTIVE AND OBJECTIVE BOX
***************************************************************  Bony Alexander, PGY 1   Internal Medicine   ***************************************************************    RAJENDRA VASQUEZ  73y  MRN: [id number]    Patient is a 73y old  Male who presents with a chief complaint of Gastric adenocarcinoma (22 Aug 2024 16:19)      Subjective: no events ON. Denies fever, CP, SOB, abn pain, N/V, dysuria. Tolerating diet.      MEDICATIONS  (STANDING):  finasteride 5 milliGRAM(s) Oral daily  lactated ringers. 1000 milliLiter(s) (50 mL/Hr) IV Continuous <Continuous>  levothyroxine 50 MICROGram(s) Oral daily  pantoprazole  Injectable 40 milliGRAM(s) IV Push two times a day  polyethylene glycol 3350 17 Gram(s) Oral daily  senna 2 Tablet(s) Oral at bedtime  tamsulosin 0.4 milliGRAM(s) Oral at bedtime    MEDICATIONS  (PRN):  acetaminophen     Tablet .. 650 milliGRAM(s) Oral every 6 hours PRN Temp greater or equal to 38C (100.4F), Mild Pain (1 - 3)  aluminum hydroxide/magnesium hydroxide/simethicone Suspension 30 milliLiter(s) Oral every 4 hours PRN Dyspepsia  melatonin 3 milliGRAM(s) Oral at bedtime PRN Insomnia  morphine  - Injectable 4 milliGRAM(s) IV Push every 4 hours PRN Severe Pain (7 - 10)  ondansetron Injectable 4 milliGRAM(s) IV Push every 8 hours PRN Nausea and/or Vomiting  oxyCODONE    IR 10 milliGRAM(s) Oral every 4 hours PRN Moderate Pain (4 - 6)  simethicone 80 milliGRAM(s) Chew every 8 hours PRN Heartburn      Objective:    Vitals: Vital Signs Last 24 Hrs  T(C): 36.7 (08-23-24 @ 04:23), Max: 37.1 (08-22-24 @ 14:35)  T(F): 98.1 (08-23-24 @ 04:23), Max: 98.7 (08-22-24 @ 14:35)  HR: 72 (08-23-24 @ 04:23) (63 - 80)  BP: 139/76 (08-23-24 @ 04:23) (125/72 - 146/86)  BP(mean): 97 (08-22-24 @ 11:45) (90 - 102)  RR: 18 (08-23-24 @ 04:23) (13 - 21)  SpO2: 92% (08-23-24 @ 04:23) (92% - 98%)            I&O's Summary    22 Aug 2024 07:01  -  23 Aug 2024 07:00  --------------------------------------------------------  IN: 240 mL / OUT: 0 mL / NET: 240 mL        PHYSICAL EXAM:  GENERAL: NAD  HEAD:  Atraumatic, Normocephalic  EYES: EOMI, conjunctiva and sclera clear  CHEST/LUNG: Clear to auscultation bilaterally; No rales, rhonchi, wheezing, or rubs  HEART: Regular rate and rhythm; No murmurs, rubs, or gallops  ABDOMEN: Soft, Nontender, Nondistended;   SKIN: No rashes or lesions  NERVOUS SYSTEM:  Alert & Oriented X3, no focal deficits    LABS:  08-22    130<L>  |  95<L>  |  19  ----------------------------<  79  4.4   |  21<L>  |  0.85  08-22    125<L>  |  93<L>  |  22  ----------------------------<  88  4.5   |  18<L>  |  0.87  08-21    125<L>  |  90<L>  |  17  ----------------------------<  90  4.7   |  19<L>  |  0.90    Ca    9.2      22 Aug 2024 15:48  Ca    9.0      22 Aug 2024 04:26  Ca    9.3      21 Aug 2024 23:38  Phos  2.5     08-22  Mg     2.2     08-22    TPro  6.1  /  Alb  2.7<L>  /  TBili  5.9<H>  /  DBili  x   /  AST  204<H>  /  ALT  107<H>  /  AlkPhos  789<H>  08-22  TPro  6.9  /  Alb  3.1<L>  /  TBili  5.0<H>  /  DBili  x   /  AST  248<H>  /  ALT  121<H>  /  AlkPhos  849<H>  08-21      PT/INR - ( 22 Aug 2024 04:26 )   PT: 11.5 sec;   INR: 1.10 ratio         PTT - ( 22 Aug 2024 04:26 )  PTT:25.2 sec              Urinalysis Basic - ( 22 Aug 2024 15:48 )    Color: x / Appearance: x / SG: x / pH: x  Gluc: 79 mg/dL / Ketone: x  / Bili: x / Urobili: x   Blood: x / Protein: x / Nitrite: x   Leuk Esterase: x / RBC: x / WBC x   Sq Epi: x / Non Sq Epi: x / Bacteria: x                              11.1   9.40  )-----------( 163      ( 22 Aug 2024 04:26 )             33.4                         12.1   11.37 )-----------( 194      ( 21 Aug 2024 07:54 )             37.5     CAPILLARY BLOOD GLUCOSE          RADIOLOGY & ADDITIONAL TESTS:    Imaging Personally Reviewed:  [x ] YES  [ ] NO    Consultants involved in case:   Consultant(s) Notes Reviewed:  [ x] YES  [ ] NO:   Care Discussed with Consultants/Other Providers [x ] YES  [ ] NO         ***************************************************************  Bony Alexander, PGY 1   Internal Medicine   ***************************************************************    RAJENDRA VASQUEZ  73y  MRN: [id number]    Patient is a 73y old  Male who presents with a chief complaint of Gastric adenocarcinoma (22 Aug 2024 16:19)      Subjective: no events ON. Pt reports generalized weakness. Denies fever, CP, SOB, abn pain, N/V, dysuria. Tolerating diet.      MEDICATIONS  (STANDING):  finasteride 5 milliGRAM(s) Oral daily  lactated ringers. 1000 milliLiter(s) (50 mL/Hr) IV Continuous <Continuous>  levothyroxine 50 MICROGram(s) Oral daily  pantoprazole  Injectable 40 milliGRAM(s) IV Push two times a day  polyethylene glycol 3350 17 Gram(s) Oral daily  senna 2 Tablet(s) Oral at bedtime  tamsulosin 0.4 milliGRAM(s) Oral at bedtime    MEDICATIONS  (PRN):  acetaminophen     Tablet .. 650 milliGRAM(s) Oral every 6 hours PRN Temp greater or equal to 38C (100.4F), Mild Pain (1 - 3)  aluminum hydroxide/magnesium hydroxide/simethicone Suspension 30 milliLiter(s) Oral every 4 hours PRN Dyspepsia  melatonin 3 milliGRAM(s) Oral at bedtime PRN Insomnia  morphine  - Injectable 4 milliGRAM(s) IV Push every 4 hours PRN Severe Pain (7 - 10)  ondansetron Injectable 4 milliGRAM(s) IV Push every 8 hours PRN Nausea and/or Vomiting  oxyCODONE    IR 10 milliGRAM(s) Oral every 4 hours PRN Moderate Pain (4 - 6)  simethicone 80 milliGRAM(s) Chew every 8 hours PRN Heartburn      Objective:    Vitals: Vital Signs Last 24 Hrs  T(C): 36.7 (08-23-24 @ 04:23), Max: 37.1 (08-22-24 @ 14:35)  T(F): 98.1 (08-23-24 @ 04:23), Max: 98.7 (08-22-24 @ 14:35)  HR: 72 (08-23-24 @ 04:23) (63 - 80)  BP: 139/76 (08-23-24 @ 04:23) (125/72 - 146/86)  BP(mean): 97 (08-22-24 @ 11:45) (90 - 102)  RR: 18 (08-23-24 @ 04:23) (13 - 21)  SpO2: 92% (08-23-24 @ 04:23) (92% - 98%)            I&O's Summary    22 Aug 2024 07:01  -  23 Aug 2024 07:00  --------------------------------------------------------  IN: 240 mL / OUT: 0 mL / NET: 240 mL      PHYSICAL EXAM:  HEAD:  Atraumatic, Normocephalic  EYES: EOMI, pupils PERRL, +scleral icterus.  ENT: dry mucous membranes  CHEST/LUNG: Clear to auscultation bilaterally; No rales, rhonchi, wheezing, or rubs  HEART: Regular rate and rhythm; No murmurs, rubs, or gallops  ABDOMEN: Epigastric and RUQ TTP, abdomen soft, nondistended, bowel sounds present. IR biopsy site clean dry and intact, no redness swelling or discharge. No bruising over abdomen or flanks. Unchanged abdominal exam.  LE: trace LE b/l pitting edema, no calf tenderness.   SKIN: Diffuse jaundice  NERVOUS SYSTEM:  Alert & Oriented X3, no focal deficits      LABS:  08-22    130<L>  |  95<L>  |  19  ----------------------------<  79  4.4   |  21<L>  |  0.85  08-22    125<L>  |  93<L>  |  22  ----------------------------<  88  4.5   |  18<L>  |  0.87  08-21    125<L>  |  90<L>  |  17  ----------------------------<  90  4.7   |  19<L>  |  0.90    Ca    9.2      22 Aug 2024 15:48  Ca    9.0      22 Aug 2024 04:26  Ca    9.3      21 Aug 2024 23:38  Phos  2.5     08-22  Mg     2.2     08-22    TPro  6.1  /  Alb  2.7<L>  /  TBili  5.9<H>  /  DBili  x   /  AST  204<H>  /  ALT  107<H>  /  AlkPhos  789<H>  08-22  TPro  6.9  /  Alb  3.1<L>  /  TBili  5.0<H>  /  DBili  x   /  AST  248<H>  /  ALT  121<H>  /  AlkPhos  849<H>  08-21      PT/INR - ( 22 Aug 2024 04:26 )   PT: 11.5 sec;   INR: 1.10 ratio         PTT - ( 22 Aug 2024 04:26 )  PTT:25.2 sec              Urinalysis Basic - ( 22 Aug 2024 15:48 )    Color: x / Appearance: x / SG: x / pH: x  Gluc: 79 mg/dL / Ketone: x  / Bili: x / Urobili: x   Blood: x / Protein: x / Nitrite: x   Leuk Esterase: x / RBC: x / WBC x   Sq Epi: x / Non Sq Epi: x / Bacteria: x                              11.1   9.40  )-----------( 163      ( 22 Aug 2024 04:26 )             33.4                         12.1   11.37 )-----------( 194      ( 21 Aug 2024 07:54 )             37.5     CAPILLARY BLOOD GLUCOSE          RADIOLOGY & ADDITIONAL TESTS:    Imaging Personally Reviewed:  [x ] YES  [ ] NO    Consultants involved in case:   Consultant(s) Notes Reviewed:  [ x] YES  [ ] NO:   Care Discussed with Consultants/Other Providers [x ] YES  [ ] NO

## 2024-08-23 NOTE — PROGRESS NOTE ADULT - PROBLEM SELECTOR PLAN 3
Bilirubin Total: 5.9 <- 5.0 <- 4.2 <- 3.3 <- 3.0 <- 1.5admission  Alkaline Phosphatase: 789 <- 849 <- 797 <- 785  AST: 204 <- 248 <- 180 <- 158  ALT: 107 <- 121 <- 103 <- 91  Suspected to be due to metastatic infiltration from multiple mets  Overall trend is upwards.    -GI and Onc following  -trend CMP Bilirubin Total: 7.0 <- 5.9 <- 5.0 <- 4.2 <- 3.3 <- 3.0 <- 1.5admission  Alkaline Phosphatase: 785 <- 789 <- 849 <- 797 <- 785  AST: 214 <- 204 <- 248 <- 180 <- 158  ALT: 114 <-  107 <- 121 <- 103 <- 91  Suspected to be due to metastatic infiltration from multiple mets  Overall trend is upwards.    -GI and Onc following  -trend CMP

## 2024-08-23 NOTE — PROGRESS NOTE ADULT - ATTENDING COMMENTS
73 y.o male with newly diagnosed metastatic gastric adenocarcinoma.  Hyperbilirubinemia, most likely from infiltrative disease. Not a candidate for chemotherapy due to rising bili and deteriorating PS.  IHC for MMR pending if MSI-H, maybe a candidate for IO.  Plan reviewed with patient and his son.  Will f/u.

## 2024-08-23 NOTE — PROGRESS NOTE ADULT - ATTENDING COMMENTS
Patient seen and evaluated at bedside. Sitting in chair, appears the best I have seen him this admission. No pain. Tolerating fruit for diet.       Labs  Na 128  Bilirubin with constant uptrending day by day.        #Metastatic gastric cancer  -IR for liver bx with MMR testing on 8/22, site without signs of bleeding  -if bleeds again consideration of EGD with hemospray.  -poor prognosis overall. If no MMR mutation likely transition to comfort care measures.     #hyponatremia  -Na 128  -repeat Esme, UOsm as patient at risk for SIADH as well as hypovolemia  -50cc 0.9%NaCl x12 hours, monitor BID BMP for now.    #transaminitis and hyperbilirubinemai, worsening  -not amenable to stent per advanced GI    #Hx cADg  -hold ASA indefinitely 2/2 bleeding risk    #hypothyroidism'  , continue with synthroid    #neoplastic pain  -given poor po intake, change to morphine 4mg q4hr prn    #Advanced care planning  - Anticipate worsening bilirubin and functional status and doubt patient will have functional capacity to be able to [facility] for treatment even if candidate for immunotherapy, but I hope to be proven wrong.  -continued GOC conversations given high risk of progression of liver failure and death.

## 2024-08-23 NOTE — PROGRESS NOTE ADULT - SUBJECTIVE AND OBJECTIVE BOX
Oncology Follow-up    INTERVAL HPI/OVERNIGHT EVENTS:  Patient seen and evaluated at bedside, was sitting up in chair with son bedside him attempting to feed him. No overnight events.    VITAL SIGNS:  T(F): 98.1 (08-23-24 @ 04:23)  HR: 72 (08-23-24 @ 04:23)  BP: 139/76 (08-23-24 @ 04:23)  RR: 18 (08-23-24 @ 04:23)  SpO2: 92% (08-23-24 @ 04:23)  Wt(kg): --    PHYSICAL EXAM:  GENERAL: thin elderly male  HEAD:  Atraumatic, Normocephalic  EYES: EOMI, conjunctiva and sclera clear  CHEST/LUNG: Clear to auscultation bilaterally  HEART: Regular rate and rhythm  ABDOMEN: Soft, Nontender, Nondistended  EXTREMITIES: No clubbing, cyanosis, or edema  NEUROLOGY: non-focal  SKIN: No rashes or lesions    MEDICATIONS  (STANDING):  finasteride 5 milliGRAM(s) Oral daily  levothyroxine 50 MICROGram(s) Oral daily  pantoprazole  Injectable 40 milliGRAM(s) IV Push two times a day  polyethylene glycol 3350 17 Gram(s) Oral daily  senna 2 Tablet(s) Oral at bedtime  sodium chloride 0.9%. 500 milliLiter(s) (50 mL/Hr) IV Continuous <Continuous>  tamsulosin 0.4 milliGRAM(s) Oral at bedtime    MEDICATIONS  (PRN):  acetaminophen     Tablet .. 650 milliGRAM(s) Oral every 6 hours PRN Temp greater or equal to 38C (100.4F), Mild Pain (1 - 3)  aluminum hydroxide/magnesium hydroxide/simethicone Suspension 30 milliLiter(s) Oral every 4 hours PRN Dyspepsia  melatonin 3 milliGRAM(s) Oral at bedtime PRN Insomnia  morphine  - Injectable 4 milliGRAM(s) IV Push every 4 hours PRN Severe Pain (7 - 10)  ondansetron Injectable 4 milliGRAM(s) IV Push every 8 hours PRN Nausea and/or Vomiting  oxyCODONE    IR 10 milliGRAM(s) Oral every 4 hours PRN Moderate Pain (4 - 6)  simethicone 80 milliGRAM(s) Chew every 8 hours PRN Heartburn      No Known Allergies      LABS:                        11.0   8.32  )-----------( 175      ( 23 Aug 2024 07:24 )             32.2     08-23    128<L>  |  92<L>  |  18  ----------------------------<  55<L>  4.6   |  18<L>  |  0.79    Ca    9.3      23 Aug 2024 07:23  Phos  2.5     08-22  Mg     2.2     08-22    TPro  6.6  /  Alb  2.8<L>  /  TBili  7.0<H>  /  DBili  x   /  AST  214<H>  /  ALT  114<H>  /  AlkPhos  785<H>  08-23    PT/INR - ( 22 Aug 2024 04:26 )   PT: 11.5 sec;   INR: 1.10 ratio         PTT - ( 22 Aug 2024 04:26 )  PTT:25.2 sec   Urinalysis Basic - ( 23 Aug 2024 07:23 )    Color: x / Appearance: x / SG: x / pH: x  Gluc: 55 mg/dL / Ketone: x  / Bili: x / Urobili: x   Blood: x / Protein: x / Nitrite: x   Leuk Esterase: x / RBC: x / WBC x   Sq Epi: x / Non Sq Epi: x / Bacteria: x        RADIOLOGY & ADDITIONAL TESTS:  Studies reviewed.

## 2024-08-23 NOTE — PROGRESS NOTE ADULT - PROBLEM SELECTOR PLAN 1
Biopsy proven in [facility] from endoscopy on 8/12/24.   Hospital course with Episodes of coffee ground emesis on 8/19, and dark stools 8/19-8/20, associated with increasing Tbili, transaminanses, and ALP levels.    GOC discussion with Onc, palliative care, and family on 8/21, family agrees to biopsy either via IR guided biopsy of liver mets or EGD biopsy of primary gastric tumor, to genotype tumor to assess any possible immunomodulatory therapy. Code status discussion started, palliative care  to follow up with family today regarding code status/molst.   Pt went for IR guided biopsy of liver mets this AM.    -Onc following  -GI following,  -Palliative Care following  - F/U biopsy results. Biopsy proven in [facility] from endoscopy on 8/12/24.   Hospital course with Episodes of coffee ground emesis on 8/19, and dark stools 8/19-8/20, associated with increasing Tbili, transaminanses, and ALP levels.    GOC discussion with Onc, palliative care, and family on 8/21, family agrees to biopsy either via IR guided biopsy of liver mets or EGD biopsy of primary gastric tumor, to genotype tumor to assess any possible immunomodulatory therapy. Code status discussion started, palliative care  to follow up with family today regarding code status/molst.   Pt went for IR guided biopsy of liver mets 8/22 AM.    -Onc following  -GI following,  -Palliative Care following  - F/U biopsy results.

## 2024-08-24 NOTE — PROGRESS NOTE ADULT - PROBLEM SELECTOR PLAN 6
2/2 thrombophlebitis. Small 3cm circular area of redness with associated induration and TTP over dorsum of L hand, no streaking, no spreading redness, no fluctuance, Sensorimotor function of L hand intact. Reports frequent needle sticks in L hand during recent hospitalization in [facility].  -L hand U/S of soft tissue neg for abscess  -warm compresses prn

## 2024-08-24 NOTE — PROGRESS NOTE ADULT - ASSESSMENT
72 y/o M, PMH CAD w/5 stents, HTN, HLD, recent Dx of gastric adenocarcinoma, Persian speaking, presents with burning epigastric pain associated with eating, shortness of breath on exertion x 2 weeks. He was evaluated in [facility] with endoscopy for the abdominal pain, constipation and black stools on 8/12, which showed a bleeding gastric ulcer, biopsy was taken and hemostasis achieved, Biopsy positive for gastric adenocarcinoma. CT imaging revealed metastasis to liver and adjacent lymph nodes. He elected to pursue further care here in the [place], and deferred any treatment in [facility]. Awaiting liver biopsy 8/20.

## 2024-08-24 NOTE — PROGRESS NOTE ADULT - ATTENDING COMMENTS
72 y/o M, PMH CAD w/5 stents, HTN, HLD, recent Dx of gastric adenocarcinoma, Italian speaking, presents with burning epigastric pain associated with eating, shortness of breath on exertion x 2 weeks. He was evaluated in [facility] with endoscopy for the abdominal pain, constipation and black stools on 8/12, which showed a bleeding gastric ulcer, biopsy was taken and hemostasis achieved, Biopsy positive for gastric adenocarcinoma. CT imaging revealed metastasis to liver and adjacent lymph nodes. He elected to pursue further care here in the [place], and deferred any treatment in [facility]. Awaiting liver biopsy 8/20.    Plan:   - Metastatic gastric cancer: hyperbilirubinemia, not a candidate for chemotherapy given impaired elevated bilirubin, pending results of liver biopsy; guarded prognosis. Heme/onc following   - Monitor Hyponatremia, likely in setting of SIADH and hypovolemia, will trial with IVF today as well   - Ongoing GOC discussion with family/palliative following     VTE Ppx: lovenox   Diet: regular   Activity as tolerated     Rest of management as per resident.

## 2024-08-24 NOTE — PROGRESS NOTE ADULT - PROBLEM SELECTOR PLAN 2
128 today, 130 yesterday, 128 on admission.  Serum Osm: 270, calculated serum osm 265.  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     -maintenance fluids with NS, 50mL/hr x 10 hours.  -regular halal diet, no salt restrictions  -trend BMP.  -fluid restrict 1.5L/day 128 today, 128 yesterday, 128 on admission.  Serum Osm: 270, calculated serum osm 265.  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     -maintenance fluids with NS, 50mL/hr x 10 hours completed 8/23/24  -regular halal diet, no salt restrictions  -trend BMP.  -fluid restrict 1.5L/day 128 today, 128 yesterday, 128 on admission.  Serum Osm: 270, calculated serum osm 265.  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     -maintenance fluids with NS, 50mL/hr x 12 hours today  -regular halal diet, no salt restrictions  -trend BMP.  -fluid restrict 1.5L/day

## 2024-08-24 NOTE — PROGRESS NOTE ADULT - SUBJECTIVE AND OBJECTIVE BOX
***************************************************************  Bony Alexander, PGY 1   Internal Medicine   ***************************************************************    RAJENDRA VASQUEZ  73y  MRN: [id number]    Patient is a 73y old  Male who presents with a chief complaint of Gastric adenocarcinoma (23 Aug 2024 12:09)      Subjective: no events ON. Denies fever, CP, SOB, abn pain, N/V, dysuria. Tolerating diet.      MEDICATIONS  (STANDING):  finasteride 5 milliGRAM(s) Oral daily  levothyroxine 50 MICROGram(s) Oral daily  pantoprazole  Injectable 40 milliGRAM(s) IV Push two times a day  polyethylene glycol 3350 17 Gram(s) Oral daily  senna 2 Tablet(s) Oral at bedtime  sodium chloride 0.9%. 500 milliLiter(s) (50 mL/Hr) IV Continuous <Continuous>  tamsulosin 0.4 milliGRAM(s) Oral at bedtime    MEDICATIONS  (PRN):  acetaminophen     Tablet .. 650 milliGRAM(s) Oral every 6 hours PRN Temp greater or equal to 38C (100.4F), Mild Pain (1 - 3)  aluminum hydroxide/magnesium hydroxide/simethicone Suspension 30 milliLiter(s) Oral every 4 hours PRN Dyspepsia  melatonin 3 milliGRAM(s) Oral at bedtime PRN Insomnia  morphine  - Injectable 4 milliGRAM(s) IV Push every 4 hours PRN Severe Pain (7 - 10)  ondansetron Injectable 4 milliGRAM(s) IV Push every 8 hours PRN Nausea and/or Vomiting  oxyCODONE    IR 10 milliGRAM(s) Oral every 4 hours PRN Moderate Pain (4 - 6)  simethicone 80 milliGRAM(s) Chew every 8 hours PRN Heartburn      Objective:    Vitals: Vital Signs Last 24 Hrs  T(C): 37.1 (08-24-24 @ 04:17), Max: 37.1 (08-24-24 @ 04:17)  T(F): 98.7 (08-24-24 @ 04:17), Max: 98.7 (08-24-24 @ 04:17)  HR: 70 (08-24-24 @ 04:17) (70 - 77)  BP: 136/81 (08-24-24 @ 04:17) (134/76 - 140/78)  BP(mean): --  RR: 18 (08-24-24 @ 04:17) (18 - 18)  SpO2: 96% (08-24-24 @ 04:17) (95% - 99%)            I&O's Summary    23 Aug 2024 07:01  -  24 Aug 2024 07:00  --------------------------------------------------------  IN: 540 mL / OUT: 0 mL / NET: 540 mL        PHYSICAL EXAM:  HEAD:  Atraumatic, Normocephalic  EYES: EOMI, pupils PERRL, +scleral icterus.  ENT: dry mucous membranes  CHEST/LUNG: Clear to auscultation bilaterally; No rales, rhonchi, wheezing, or rubs  HEART: Regular rate and rhythm; No murmurs, rubs, or gallops  ABDOMEN: Epigastric and RUQ TTP, abdomen soft, nondistended, bowel sounds present. IR biopsy site clean dry and intact, no redness swelling or discharge. No bruising over abdomen or flanks. Unchanged abdominal exam.  LE: trace LE b/l pitting edema, no calf tenderness.   SKIN: Diffuse jaundice  NERVOUS SYSTEM:  Alert & Oriented X3, no focal deficits      LABS:  08-23    128<L>  |  92<L>  |  18  ----------------------------<  55<L>  4.6   |  18<L>  |  0.79  08-22    130<L>  |  95<L>  |  19  ----------------------------<  79  4.4   |  21<L>  |  0.85  08-22    125<L>  |  93<L>  |  22  ----------------------------<  88  4.5   |  18<L>  |  0.87    Ca    9.3      23 Aug 2024 07:23  Ca    9.2      22 Aug 2024 15:48  Ca    9.0      22 Aug 2024 04:26    TPro  6.6  /  Alb  2.8<L>  /  TBili  7.0<H>  /  DBili  x   /  AST  214<H>  /  ALT  114<H>  /  AlkPhos  785<H>  08-23  TPro  6.1  /  Alb  2.7<L>  /  TBili  5.9<H>  /  DBili  x   /  AST  204<H>  /  ALT  107<H>  /  AlkPhos  789<H>  08-22  TPro  6.9  /  Alb  3.1<L>  /  TBili  5.0<H>  /  DBili  x   /  AST  248<H>  /  ALT  121<H>  /  AlkPhos  849<H>  08-21                    Urinalysis Basic - ( 23 Aug 2024 07:23 )    Color: x / Appearance: x / SG: x / pH: x  Gluc: 55 mg/dL / Ketone: x  / Bili: x / Urobili: x   Blood: x / Protein: x / Nitrite: x   Leuk Esterase: x / RBC: x / WBC x   Sq Epi: x / Non Sq Epi: x / Bacteria: x                              10.5   7.19  )-----------( 161      ( 24 Aug 2024 06:50 )             31.3                         11.0   8.32  )-----------( 175      ( 23 Aug 2024 07:24 )             32.2                         11.1   9.40  )-----------( 163      ( 22 Aug 2024 04:26 )             33.4     CAPILLARY BLOOD GLUCOSE      POCT Blood Glucose.: 75 mg/dL (23 Aug 2024 09:59)      RADIOLOGY & ADDITIONAL TESTS:    Imaging Personally Reviewed:  [x ] YES  [ ] NO    Consultants involved in case:   Consultant(s) Notes Reviewed:  [ x] YES  [ ] NO:   Care Discussed with Consultants/Other Providers [x ] YES  [ ] NO         ***************************************************************  Bony Alexander, PGY 1   Internal Medicine   ***************************************************************    RAJENDRA VASQUEZ  73y  MRN: [id number]    Patient is a 73y old  Male who presents with a chief complaint of Gastric adenocarcinoma (23 Aug 2024 12:09)      Subjective: no events ON. Pt reported some abdominal discomfort earlier this morning, resolved after pain medication. No vomiting overnight. Denies fever, CP, SOB, Nausea, dysuria.     MEDICATIONS  (STANDING):  finasteride 5 milliGRAM(s) Oral daily  levothyroxine 50 MICROGram(s) Oral daily  pantoprazole  Injectable 40 milliGRAM(s) IV Push two times a day  polyethylene glycol 3350 17 Gram(s) Oral daily  senna 2 Tablet(s) Oral at bedtime  sodium chloride 0.9%. 500 milliLiter(s) (50 mL/Hr) IV Continuous <Continuous>  tamsulosin 0.4 milliGRAM(s) Oral at bedtime    MEDICATIONS  (PRN):  acetaminophen     Tablet .. 650 milliGRAM(s) Oral every 6 hours PRN Temp greater or equal to 38C (100.4F), Mild Pain (1 - 3)  aluminum hydroxide/magnesium hydroxide/simethicone Suspension 30 milliLiter(s) Oral every 4 hours PRN Dyspepsia  melatonin 3 milliGRAM(s) Oral at bedtime PRN Insomnia  morphine  - Injectable 4 milliGRAM(s) IV Push every 4 hours PRN Severe Pain (7 - 10)  ondansetron Injectable 4 milliGRAM(s) IV Push every 8 hours PRN Nausea and/or Vomiting  oxyCODONE    IR 10 milliGRAM(s) Oral every 4 hours PRN Moderate Pain (4 - 6)  simethicone 80 milliGRAM(s) Chew every 8 hours PRN Heartburn      Objective:    Vitals: Vital Signs Last 24 Hrs  T(C): 37.1 (08-24-24 @ 04:17), Max: 37.1 (08-24-24 @ 04:17)  T(F): 98.7 (08-24-24 @ 04:17), Max: 98.7 (08-24-24 @ 04:17)  HR: 70 (08-24-24 @ 04:17) (70 - 77)  BP: 136/81 (08-24-24 @ 04:17) (134/76 - 140/78)  BP(mean): --  RR: 18 (08-24-24 @ 04:17) (18 - 18)  SpO2: 96% (08-24-24 @ 04:17) (95% - 99%)            I&O's Summary    23 Aug 2024 07:01  -  24 Aug 2024 07:00  --------------------------------------------------------  IN: 540 mL / OUT: 0 mL / NET: 540 mL        PHYSICAL EXAM:  HEAD:  Atraumatic, Normocephalic  EYES: EOMI, pupils PERRL, +scleral icterus.  ENT: dry mucous membranes  CHEST/LUNG: Clear to auscultation bilaterally; No rales, rhonchi, wheezing, or rubs  HEART: Regular rate and rhythm; No murmurs, rubs, or gallops  ABDOMEN: Epigastric and RUQ TTP, abdomen soft, nondistended, bowel sounds present.   LE: trace LE b/l pitting edema, no calf tenderness.   SKIN: Diffuse jaundice  NERVOUS SYSTEM:  Alert & Oriented X3, no focal deficits      LABS:  08-23    128<L>  |  92<L>  |  18  ----------------------------<  55<L>  4.6   |  18<L>  |  0.79  08-22    130<L>  |  95<L>  |  19  ----------------------------<  79  4.4   |  21<L>  |  0.85  08-22    125<L>  |  93<L>  |  22  ----------------------------<  88  4.5   |  18<L>  |  0.87    Ca    9.3      23 Aug 2024 07:23  Ca    9.2      22 Aug 2024 15:48  Ca    9.0      22 Aug 2024 04:26    TPro  6.6  /  Alb  2.8<L>  /  TBili  7.0<H>  /  DBili  x   /  AST  214<H>  /  ALT  114<H>  /  AlkPhos  785<H>  08-23  TPro  6.1  /  Alb  2.7<L>  /  TBili  5.9<H>  /  DBili  x   /  AST  204<H>  /  ALT  107<H>  /  AlkPhos  789<H>  08-22  TPro  6.9  /  Alb  3.1<L>  /  TBili  5.0<H>  /  DBili  x   /  AST  248<H>  /  ALT  121<H>  /  AlkPhos  849<H>  08-21                    Urinalysis Basic - ( 23 Aug 2024 07:23 )    Color: x / Appearance: x / SG: x / pH: x  Gluc: 55 mg/dL / Ketone: x  / Bili: x / Urobili: x   Blood: x / Protein: x / Nitrite: x   Leuk Esterase: x / RBC: x / WBC x   Sq Epi: x / Non Sq Epi: x / Bacteria: x                              10.5   7.19  )-----------( 161      ( 24 Aug 2024 06:50 )             31.3                         11.0   8.32  )-----------( 175      ( 23 Aug 2024 07:24 )             32.2                         11.1   9.40  )-----------( 163      ( 22 Aug 2024 04:26 )             33.4     CAPILLARY BLOOD GLUCOSE      POCT Blood Glucose.: 75 mg/dL (23 Aug 2024 09:59)      RADIOLOGY & ADDITIONAL TESTS:    Imaging Personally Reviewed:  [x ] YES  [ ] NO    Consultants involved in case:   Consultant(s) Notes Reviewed:  [ x] YES  [ ] NO:   Care Discussed with Consultants/Other Providers [x ] YES  [ ] NO         ***************************************************************  Bony Alexander, PGY 1   Internal Medicine   ***************************************************************    RAJENDRA VASQUEZ  73y  MRN: [id number]    Patient is a 73y old  Male who presents with a chief complaint of Gastric adenocarcinoma (23 Aug 2024 12:09)      Subjective: no events ON. Pt reported some abdominal discomfort earlier this morning, resolved after maalox. No vomiting overnight. Denies fever, CP, SOB, Nausea, dysuria.     MEDICATIONS  (STANDING):  finasteride 5 milliGRAM(s) Oral daily  levothyroxine 50 MICROGram(s) Oral daily  pantoprazole  Injectable 40 milliGRAM(s) IV Push two times a day  polyethylene glycol 3350 17 Gram(s) Oral daily  senna 2 Tablet(s) Oral at bedtime  sodium chloride 0.9%. 500 milliLiter(s) (50 mL/Hr) IV Continuous <Continuous>  tamsulosin 0.4 milliGRAM(s) Oral at bedtime    MEDICATIONS  (PRN):  acetaminophen     Tablet .. 650 milliGRAM(s) Oral every 6 hours PRN Temp greater or equal to 38C (100.4F), Mild Pain (1 - 3)  aluminum hydroxide/magnesium hydroxide/simethicone Suspension 30 milliLiter(s) Oral every 4 hours PRN Dyspepsia  melatonin 3 milliGRAM(s) Oral at bedtime PRN Insomnia  morphine  - Injectable 4 milliGRAM(s) IV Push every 4 hours PRN Severe Pain (7 - 10)  ondansetron Injectable 4 milliGRAM(s) IV Push every 8 hours PRN Nausea and/or Vomiting  oxyCODONE    IR 10 milliGRAM(s) Oral every 4 hours PRN Moderate Pain (4 - 6)  simethicone 80 milliGRAM(s) Chew every 8 hours PRN Heartburn      Objective:    Vitals: Vital Signs Last 24 Hrs  T(C): 37.1 (08-24-24 @ 04:17), Max: 37.1 (08-24-24 @ 04:17)  T(F): 98.7 (08-24-24 @ 04:17), Max: 98.7 (08-24-24 @ 04:17)  HR: 70 (08-24-24 @ 04:17) (70 - 77)  BP: 136/81 (08-24-24 @ 04:17) (134/76 - 140/78)  BP(mean): --  RR: 18 (08-24-24 @ 04:17) (18 - 18)  SpO2: 96% (08-24-24 @ 04:17) (95% - 99%)            I&O's Summary    23 Aug 2024 07:01  -  24 Aug 2024 07:00  --------------------------------------------------------  IN: 540 mL / OUT: 0 mL / NET: 540 mL        PHYSICAL EXAM:  HEAD:  Atraumatic, Normocephalic  EYES: EOMI, pupils PERRL, +scleral icterus.  ENT: dry mucous membranes  CHEST/LUNG: Clear to auscultation bilaterally; No rales, rhonchi, wheezing, or rubs  HEART: Regular rate and rhythm; No murmurs, rubs, or gallops  ABDOMEN: Epigastric and RUQ TTP, abdomen soft, nondistended, bowel sounds present.   LE: trace LE b/l pitting edema, no calf tenderness.   SKIN: Diffuse jaundice  NERVOUS SYSTEM:  Alert & Oriented X3, no focal deficits      LABS:  08-23    128<L>  |  92<L>  |  18  ----------------------------<  55<L>  4.6   |  18<L>  |  0.79  08-22    130<L>  |  95<L>  |  19  ----------------------------<  79  4.4   |  21<L>  |  0.85  08-22    125<L>  |  93<L>  |  22  ----------------------------<  88  4.5   |  18<L>  |  0.87    Ca    9.3      23 Aug 2024 07:23  Ca    9.2      22 Aug 2024 15:48  Ca    9.0      22 Aug 2024 04:26    TPro  6.6  /  Alb  2.8<L>  /  TBili  7.0<H>  /  DBili  x   /  AST  214<H>  /  ALT  114<H>  /  AlkPhos  785<H>  08-23  TPro  6.1  /  Alb  2.7<L>  /  TBili  5.9<H>  /  DBili  x   /  AST  204<H>  /  ALT  107<H>  /  AlkPhos  789<H>  08-22  TPro  6.9  /  Alb  3.1<L>  /  TBili  5.0<H>  /  DBili  x   /  AST  248<H>  /  ALT  121<H>  /  AlkPhos  849<H>  08-21                    Urinalysis Basic - ( 23 Aug 2024 07:23 )    Color: x / Appearance: x / SG: x / pH: x  Gluc: 55 mg/dL / Ketone: x  / Bili: x / Urobili: x   Blood: x / Protein: x / Nitrite: x   Leuk Esterase: x / RBC: x / WBC x   Sq Epi: x / Non Sq Epi: x / Bacteria: x                              10.5   7.19  )-----------( 161      ( 24 Aug 2024 06:50 )             31.3                         11.0   8.32  )-----------( 175      ( 23 Aug 2024 07:24 )             32.2                         11.1   9.40  )-----------( 163      ( 22 Aug 2024 04:26 )             33.4     CAPILLARY BLOOD GLUCOSE      POCT Blood Glucose.: 75 mg/dL (23 Aug 2024 09:59)      RADIOLOGY & ADDITIONAL TESTS:    Imaging Personally Reviewed:  [x ] YES  [ ] NO    Consultants involved in case:   Consultant(s) Notes Reviewed:  [ x] YES  [ ] NO:   Care Discussed with Consultants/Other Providers [x ] YES  [ ] NO

## 2024-08-24 NOTE — PROGRESS NOTE ADULT - PROBLEM SELECTOR PLAN 1
Biopsy proven in [facility] from endoscopy on 8/12/24.   Hospital course with Episodes of coffee ground emesis on 8/19, and dark stools 8/19-8/20, associated with increasing Tbili, transaminanses, and ALP levels.    GOC discussion with Onc, palliative care, and family on 8/21, family agrees to biopsy either via IR guided biopsy of liver mets or EGD biopsy of primary gastric tumor, to genotype tumor to assess any possible immunomodulatory therapy. Code status discussion started, palliative care  to follow up with family today regarding code status/molst.   Pt went for IR guided biopsy of liver mets 8/22 AM.    -Onc following  -GI following,  -Palliative Care following  - F/U biopsy results. Biopsy proven in [facility] from endoscopy on 8/12/24.   Hospital course with Episodes of coffee ground emesis on 8/19, and dark stools 8/19-8/20, associated with increasing Tbili, transaminanses, and ALP levels.    GOC discussion with Onc, palliative care, and family on 8/21, family agrees to biopsy either via IR guided biopsy of liver mets or EGD biopsy of primary gastric tumor, to genotype tumor to assess any possible immunomodulatory therapy. Code status discussion started, palliative care  to follow up with family today regarding code status/molst.   Pt went for IR guided biopsy of liver mets 8/22 AM.    - c/w Pantoprazole 40mg IV BID  -Onc following  -GI following,  -Palliative Care following  - F/U biopsy results.

## 2024-08-24 NOTE — PROGRESS NOTE ADULT - PROBLEM SELECTOR PLAN 3
Bilirubin Total: 7.0 <- 5.9 <- 5.0 <- 4.2 <- 3.3 <- 3.0 <- 1.5admission  Alkaline Phosphatase: 785 <- 789 <- 849 <- 797 <- 785  AST: 214 <- 204 <- 248 <- 180 <- 158  ALT: 114 <-  107 <- 121 <- 103 <- 91  Suspected to be due to metastatic infiltration from multiple mets  Overall trend is upwards.    -GI and Onc following  -trend CMP Bilirubin Total: 7.2 <- 7.0 <- 5.9 <- 5.0 <- 4.2 <- 3.3 <- 3.0 <- 1.5admission  Alkaline Phosphatase: 797 <- 785 <- 789 <- 849 <- 797 <- 785  AST: 194 <- 214 <- 204 <- 248 <- 180 <- 158  ALT: 108 <- 114 <- 107 <- 121 <- 103 <- 91  Suspected to be due to metastatic infiltration from multiple mets  Overall trend is upwards.    -GI and Onc following  -trend CMP

## 2024-08-25 NOTE — PROGRESS NOTE ADULT - ASSESSMENT
72 y/o M, PMH CAD w/5 stents, HTN, HLD, recent Dx of gastric adenocarcinoma, Slov[place] speaking, presents with burning epigastric pain associated with eating, shortness of breath on exertion x 2 weeks. He was evaluated in [facility] with endoscopy for the abdominal pain, constipation and black stools on 8/12, which showed a bleeding gastric ulcer, biopsy was taken and hemostasis achieved, Biopsy positive for gastric adenocarcinoma. CT imaging revealed metastasis to liver and adjacent lymph nodes. He elected to pursue further care here in the [place], and deferred any treatment in [facility]. Awaiting liver biopsy 8/20.

## 2024-08-25 NOTE — PROGRESS NOTE ADULT - PROBLEM SELECTOR PLAN 3
Bilirubin Total: 7.2 <- 7.0 <- 5.9 <- 5.0 <- 4.2 <- 3.3 <- 3.0 <- 1.5admission  Alkaline Phosphatase: 797 <- 785 <- 789 <- 849 <- 797 <- 785  AST: 194 <- 214 <- 204 <- 248 <- 180 <- 158  ALT: 108 <- 114 <- 107 <- 121 <- 103 <- 91  Suspected to be due to metastatic infiltration from multiple mets  Overall trend is upwards.    -GI and Onc following  -trend CMP Bilirubin Total: 8.1 <- 7.2 <- 7.0 <- 5.9 <- 5.0 <- 4.2 <- 3.3 <- 3.0 <- 1.5admission  Alkaline Phosphatase: 828 <- 797 <- 785 <- 789 <- 849 <- 797 <- 785  AST: 213 <- 194 <- 214 <- 204 <- 248 <- 180 <- 158  ALT: 114 <- 108 <- 114 <- 107 <- 121 <- 103 <- 91  Suspected to be due to metastatic infiltration from multiple mets  Overall trend is upwards.    -GI and Onc following  -trend CMP

## 2024-08-25 NOTE — PROGRESS NOTE ADULT - ATTENDING COMMENTS
72 y/o M, PMH CAD w/5 stents, HTN, HLD, recent Dx of gastric adenocarcinoma, Czech speaking, presents with burning epigastric pain associated with eating, shortness of breath on exertion x 2 weeks. He was evaluated in [facility] with endoscopy for the abdominal pain, constipation and black stools on 8/12, which showed a bleeding gastric ulcer, biopsy was taken and hemostasis achieved, Biopsy positive for gastric adenocarcinoma. CT imaging revealed metastasis to liver and adjacent lymph nodes. He elected to pursue further care here in the [place], and deferred any treatment in [facility]. Awaiting liver biopsy 8/20.    Plan:   - Metastatic gastric cancer: hyperbilirubinemia, not a candidate for chemotherapy given impaired elevated bilirubin, pending results of liver biopsy; guarded prognosis. 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

## 2024-08-25 NOTE — PROGRESS NOTE ADULT - PROBLEM SELECTOR PLAN 6
2/2 thrombophlebitis. Small 3cm circular area of redness with associated induration and TTP over dorsum of L hand, no streaking, no spreading redness, no fluctuance, Sensorimotor function of L hand intact. Reports frequent needle sticks in L hand during recent hospitalization in [facility].  -L hand U/S of soft tissue neg for abscess  -warm compresses prn

## 2024-08-25 NOTE — PROGRESS NOTE ADULT - PROBLEM SELECTOR PLAN 1
Biopsy proven in [facility] from endoscopy on 8/12/24.   Hospital course with Episodes of coffee ground emesis on 8/19, and dark stools 8/19-8/20, associated with increasing Tbili, transaminanses, and ALP levels.    GOC discussion with Onc, palliative care, and family on 8/21, family agrees to biopsy either via IR guided biopsy of liver mets or EGD biopsy of primary gastric tumor, to genotype tumor to assess any possible immunomodulatory therapy. Code status discussion started, palliative care  to follow up with family today regarding code status/molst.   Pt went for IR guided biopsy of liver mets 8/22 AM.    - c/w Pantoprazole 40mg IV BID  -Onc following  -GI following,  -Palliative Care following  - F/U biopsy results.

## 2024-08-25 NOTE — PROGRESS NOTE ADULT - SUBJECTIVE AND OBJECTIVE BOX
***************************************************************  Bony Alexander, PGY 1   Internal Medicine   ***************************************************************    RAJENDRA VASQUEZ  73y  MRN: [id number]    Patient is a 73y old  Male who presents with a chief complaint of Gastric adenocarcinoma (24 Aug 2024 07:25)      Subjective: no events ON. Denies fever, CP, SOB, abn pain, N/V, dysuria. Tolerating diet.      MEDICATIONS  (STANDING):  enoxaparin Injectable 40 milliGRAM(s) SubCutaneous every 24 hours  finasteride 5 milliGRAM(s) Oral daily  lactated ringers. 1000 milliLiter(s) (50 mL/Hr) IV Continuous <Continuous>  levothyroxine 50 MICROGram(s) Oral daily  pantoprazole  Injectable 40 milliGRAM(s) IV Push two times a day  polyethylene glycol 3350 17 Gram(s) Oral daily  senna 2 Tablet(s) Oral at bedtime  sodium chloride 0.9%. 500 milliLiter(s) (50 mL/Hr) IV Continuous <Continuous>  tamsulosin 0.4 milliGRAM(s) Oral at bedtime    MEDICATIONS  (PRN):  acetaminophen     Tablet .. 650 milliGRAM(s) Oral every 6 hours PRN Temp greater or equal to 38C (100.4F), Mild Pain (1 - 3)  aluminum hydroxide/magnesium hydroxide/simethicone Suspension 30 milliLiter(s) Oral every 4 hours PRN Dyspepsia  melatonin 3 milliGRAM(s) Oral at bedtime PRN Insomnia  morphine  - Injectable 4 milliGRAM(s) IV Push every 4 hours PRN Severe Pain (7 - 10)  ondansetron Injectable 4 milliGRAM(s) IV Push every 8 hours PRN Nausea and/or Vomiting  oxyCODONE    IR 10 milliGRAM(s) Oral every 4 hours PRN Moderate Pain (4 - 6)  simethicone 80 milliGRAM(s) Chew every 8 hours PRN Heartburn      Objective:    Vitals: Vital Signs Last 24 Hrs  T(C): 36.3 (08-25-24 @ 04:17), Max: 36.7 (08-24-24 @ 13:43)  T(F): 97.4 (08-25-24 @ 04:17), Max: 98 (08-24-24 @ 13:43)  HR: 72 (08-25-24 @ 04:17) (72 - 76)  BP: 127/74 (08-25-24 @ 04:17) (127/74 - 136/83)  BP(mean): --  RR: 18 (08-25-24 @ 04:17) (18 - 18)  SpO2: 97% (08-25-24 @ 04:17) (96% - 99%)            I&O's Summary    24 Aug 2024 07:01  -  25 Aug 2024 07:00  --------------------------------------------------------  IN: 500 mL / OUT: 0 mL / NET: 500 mL        PHYSICAL EXAM:  GENERAL: NAD  HEAD:  Atraumatic, Normocephalic  EYES: EOMI, conjunctiva and sclera clear  CHEST/LUNG: Clear to auscultation bilaterally; No rales, rhonchi, wheezing, or rubs  HEART: Regular rate and rhythm; No murmurs, rubs, or gallops  ABDOMEN: Soft, Nontender, Nondistended;   SKIN: No rashes or lesions  NERVOUS SYSTEM:  Alert & Oriented X3, no focal deficits    LABS:  08-25    127<L>  |  92<L>  |  19  ----------------------------<  75  4.0   |  20<L>  |  0.87  08-24    128<L>  |  92<L>  |  18  ----------------------------<  86  4.3   |  18<L>  |  0.85  08-23    128<L>  |  92<L>  |  18  ----------------------------<  55<L>  4.6   |  18<L>  |  0.79    Ca    8.9      25 Aug 2024 06:35  Ca    9.2      24 Aug 2024 06:50  Ca    9.3      23 Aug 2024 07:23  Phos  3.5     08-25  Mg     2.0     08-25    TPro  6.1  /  Alb  2.7<L>  /  TBili  8.1<H>  /  DBili  x   /  AST  213<H>  /  ALT  114<H>  /  AlkPhos  828<H>  08-25  TPro  6.3  /  Alb  2.6<L>  /  TBili  7.2<H>  /  DBili  x   /  AST  194<H>  /  ALT  108<H>  /  AlkPhos  797<H>  08-24  TPro  6.6  /  Alb  2.8<L>  /  TBili  7.0<H>  /  DBili  x   /  AST  214<H>  /  ALT  114<H>  /  AlkPhos  785<H>  08-23                    Urinalysis Basic - ( 25 Aug 2024 06:35 )    Color: x / Appearance: x / SG: x / pH: x  Gluc: 75 mg/dL / Ketone: x  / Bili: x / Urobili: x   Blood: x / Protein: x / Nitrite: x   Leuk Esterase: x / RBC: x / WBC x   Sq Epi: x / Non Sq Epi: x / Bacteria: x                              10.8   6.40  )-----------( 173      ( 25 Aug 2024 06:35 )             31.2                         10.5   7.19  )-----------( 161      ( 24 Aug 2024 06:50 )             31.3                         11.0   8.32  )-----------( 175      ( 23 Aug 2024 07:24 )             32.2     CAPILLARY BLOOD GLUCOSE          RADIOLOGY & ADDITIONAL TESTS:    Imaging Personally Reviewed:  [x ] YES  [ ] NO    Consultants involved in case:   Consultant(s) Notes Reviewed:  [ x] YES  [ ] NO:   Care Discussed with Consultants/Other Providers [x ] YES  [ ] NO         ***************************************************************  Bony Alexander, PGY 1   Internal Medicine   ***************************************************************    RAJEDNRA VASQUEZ  73y  MRN: [id number]    Patient is a 73y old  Male who presents with a chief complaint of Gastric adenocarcinoma (24 Aug 2024 07:25)      Subjective: no events ON. Pt reports 2 loose light brown colored bowel movements, reports gen weakness and abdominal discomfort with walking. He has been eating and drinking small amounts. Denies fever, CP, SOB, N/V, dysuria. Tolerating diet.      MEDICATIONS  (STANDING):  enoxaparin Injectable 40 milliGRAM(s) SubCutaneous every 24 hours  finasteride 5 milliGRAM(s) Oral daily  lactated ringers. 1000 milliLiter(s) (50 mL/Hr) IV Continuous <Continuous>  levothyroxine 50 MICROGram(s) Oral daily  pantoprazole  Injectable 40 milliGRAM(s) IV Push two times a day  polyethylene glycol 3350 17 Gram(s) Oral daily  senna 2 Tablet(s) Oral at bedtime  sodium chloride 0.9%. 500 milliLiter(s) (50 mL/Hr) IV Continuous <Continuous>  tamsulosin 0.4 milliGRAM(s) Oral at bedtime    MEDICATIONS  (PRN):  acetaminophen     Tablet .. 650 milliGRAM(s) Oral every 6 hours PRN Temp greater or equal to 38C (100.4F), Mild Pain (1 - 3)  aluminum hydroxide/magnesium hydroxide/simethicone Suspension 30 milliLiter(s) Oral every 4 hours PRN Dyspepsia  melatonin 3 milliGRAM(s) Oral at bedtime PRN Insomnia  morphine  - Injectable 4 milliGRAM(s) IV Push every 4 hours PRN Severe Pain (7 - 10)  ondansetron Injectable 4 milliGRAM(s) IV Push every 8 hours PRN Nausea and/or Vomiting  oxyCODONE    IR 10 milliGRAM(s) Oral every 4 hours PRN Moderate Pain (4 - 6)  simethicone 80 milliGRAM(s) Chew every 8 hours PRN Heartburn      Objective:    Vitals: Vital Signs Last 24 Hrs  T(C): 36.3 (08-25-24 @ 04:17), Max: 36.7 (08-24-24 @ 13:43)  T(F): 97.4 (08-25-24 @ 04:17), Max: 98 (08-24-24 @ 13:43)  HR: 72 (08-25-24 @ 04:17) (72 - 76)  BP: 127/74 (08-25-24 @ 04:17) (127/74 - 136/83)  BP(mean): --  RR: 18 (08-25-24 @ 04:17) (18 - 18)  SpO2: 97% (08-25-24 @ 04:17) (96% - 99%)            I&O's Summary    24 Aug 2024 07:01  -  25 Aug 2024 07:00  --------------------------------------------------------  IN: 500 mL / OUT: 0 mL / NET: 500 mL        PHYSICAL EXAM:  GENERAL: NAD, tired appearing.  HEAD:  Atraumatic, Normocephalic  EYES: EOMI, pupils PERRL, +scleral icterus.  ENT: dry mucous membranes  CHEST/LUNG: Clear to auscultation bilaterally; No rales, rhonchi, wheezing, or rubs  HEART: Regular rate and rhythm; No murmurs, rubs, or gallops  ABDOMEN: Epigastric and RUQ TTP, abdomen soft, nondistended, bowel sounds present.   LE: trace LE b/l pitting edema, no calf tenderness.   SKIN: Diffuse jaundice  NERVOUS SYSTEM:  Alert & Oriented X3, no focal deficits      LABS:  08-25    127<L>  |  92<L>  |  19  ----------------------------<  75  4.0   |  20<L>  |  0.87  08-24    128<L>  |  92<L>  |  18  ----------------------------<  86  4.3   |  18<L>  |  0.85  08-23    128<L>  |  92<L>  |  18  ----------------------------<  55<L>  4.6   |  18<L>  |  0.79    Ca    8.9      25 Aug 2024 06:35  Ca    9.2      24 Aug 2024 06:50  Ca    9.3      23 Aug 2024 07:23  Phos  3.5     08-25  Mg     2.0     08-25    TPro  6.1  /  Alb  2.7<L>  /  TBili  8.1<H>  /  DBili  x   /  AST  213<H>  /  ALT  114<H>  /  AlkPhos  828<H>  08-25  TPro  6.3  /  Alb  2.6<L>  /  TBili  7.2<H>  /  DBili  x   /  AST  194<H>  /  ALT  108<H>  /  AlkPhos  797<H>  08-24  TPro  6.6  /  Alb  2.8<L>  /  TBili  7.0<H>  /  DBili  x   /  AST  214<H>  /  ALT  114<H>  /  AlkPhos  785<H>  08-23                    Urinalysis Basic - ( 25 Aug 2024 06:35 )    Color: x / Appearance: x / SG: x / pH: x  Gluc: 75 mg/dL / Ketone: x  / Bili: x / Urobili: x   Blood: x / Protein: x / Nitrite: x   Leuk Esterase: x / RBC: x / WBC x   Sq Epi: x / Non Sq Epi: x / Bacteria: x                              10.8   6.40  )-----------( 173      ( 25 Aug 2024 06:35 )             31.2                         10.5   7.19  )-----------( 161      ( 24 Aug 2024 06:50 )             31.3                         11.0   8.32  )-----------( 175      ( 23 Aug 2024 07:24 )             32.2     CAPILLARY BLOOD GLUCOSE          RADIOLOGY & ADDITIONAL TESTS:    Imaging Personally Reviewed:  [x ] YES  [ ] NO    Consultants involved in case:   Consultant(s) Notes Reviewed:  [ x] YES  [ ] NO:   Care Discussed with Consultants/Other Providers [x ] YES  [ ] NO

## 2024-08-25 NOTE — PROGRESS NOTE ADULT - PROBLEM SELECTOR PLAN 2
128 today, 128 yesterday, 128 on admission.  Serum Osm: 270, calculated serum osm 265.  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     -maintenance fluids with NS, 50mL/hr x 12 hours today  -regular halal diet, no salt restrictions  -trend BMP.  -fluid restrict 1.5L/day 127 today, 128 yesterday, 128 on admission.  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   Received maintenance fluids LR yesterday x 12 hours.    -regular halal diet, no salt restrictions  -trend BMP.  -fluid restrict 1.5L/day

## 2024-08-26 NOTE — PROGRESS NOTE ADULT - PROBLEM SELECTOR PLAN 2
127 today, 128 yesterday, 128 on admission.  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   Received maintenance fluids LR yesterday x 12 hours.    -regular halal diet, no salt restrictions  -trend BMP.  -fluid restrict 1.5L/day 127 today, 128 yesterday, 128 on admission.  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   Received maintenance fluids LR yesterday x 12 hours.    -regular halal diet, no salt restrictions  -trend BMP.  -fluid restrict 1.5L/day  - Repeat urine osoms due to Na 126

## 2024-08-26 NOTE — PROGRESS NOTE ADULT - PROBLEM SELECTOR PLAN 1
Biopsy proven in [facility] from endoscopy on 8/12/24.   Hospital course with Episodes of coffee ground emesis on 8/19, and dark stools 8/19-8/20, associated with increasing Tbili, transaminanses, and ALP levels.    GOC discussion with Onc, palliative care, and family on 8/21, family agrees to biopsy either via IR guided biopsy of liver mets or EGD biopsy of primary gastric tumor, to genotype tumor to assess any possible immunomodulatory therapy. Code status discussion started, palliative care  to follow up with family today regarding code status/molst.   Pt went for IR guided biopsy of liver mets 8/22 AM.    - c/w Pantoprazole 40mg IV BID  -Onc following  -GI following,  -Palliative Care following  - F/U biopsy results. Biopsy proven in [facility] from endoscopy on 8/12/24.   Hospital course with Episodes of coffee ground emesis on 8/19, and dark stools 8/19-8/20, associated with increasing Tbili, transaminanses, and ALP levels.    GOC discussion with Onc, palliative care, and family on 8/21, family agrees to biopsy either via IR guided biopsy of liver mets or EGD biopsy of primary gastric tumor, to genotype tumor to assess any possible immunomodulatory therapy. Code status discussion started, palliative care  to follow up with family today regarding code status/molst.   Pt went for IR guided biopsy of liver mets 8/22 AM.    - c/w Pantoprazole 40mg PO BID  -Onc following  -GI following,  -Palliative Care following  - F/U biopsy results - expedited

## 2024-08-26 NOTE — PROGRESS NOTE ADULT - PROBLEM SELECTOR PLAN 5
will continue to follow for goc  case discussed with primary and oncology team  Can be reached by TEAMS [id number] 9-5 Clarisa Mao Any other time please page [phone] if needed

## 2024-08-26 NOTE — PROGRESS NOTE ADULT - PROBLEM SELECTOR PLAN 4
see goc note above  HCP completed   ongoing goc discussion, spoke to pt's son Elizabeth who he defers to regarding advanced directives, he states he needs more time to speak with his mom and uncle regarding advanced directives

## 2024-08-26 NOTE — DISCHARGE NOTE PROVIDER - CARE PROVIDER_API CALL
Xochitl Huerta.  Internal Medicine  [id number] [place] [id number]  Phone: [phone]  Fax: [phone]  Established Patient  Follow Up Time: 1 week

## 2024-08-26 NOTE — PROGRESS NOTE ADULT - ASSESSMENT
74 y/o M, PMH CAD w/5 stents, HTN, HLD, recent Dx of gastric adenocarcinoma, Maori speaking, presents with burning epigastric pain associated with eating, shortness of breath on exertion x 2 weeks. He was evaluated in [facility] with endoscopy for the abdominal pain, constipation and black stools on 8/12, which showed a bleeding gastric ulcer, biopsy was taken and hemostasis achieved, Biopsy positive for gastric adenocarcinoma. CT imaging revealed metastasis to liver and adjacent lymph nodes. He elected to pursue further care here in the [place], and deferred any treatment in [facility]. Awaiting liver biopsy 8/20.

## 2024-08-26 NOTE — PROGRESS NOTE ADULT - PROBLEM SELECTOR PLAN 6
2/2 thrombophlebitis. Small 3cm circular area of redness with associated induration and TTP over dorsum of L hand, no streaking, no spreading redness, no fluctuance, Sensorimotor function of L hand intact. Reports frequent needle sticks in L hand during recent hospitalization in [facility].  -L hand U/S of soft tissue neg for abscess  -warm compresses prn

## 2024-08-26 NOTE — DISCHARGE NOTE PROVIDER - NSDCFUADDAPPT_GEN_ALL_CORE_FT
APPTS ARE READY TO BE MADE: [X] YES    Best Family or Patient Contact (if needed):    Additional Information about above appointments (if needed):    1: Dr. Xochitl Huerta  2:   3:     Other comments or requests:    APPTS ARE READY TO BE MADE: [X] YES    Best Family or Patient Contact (if needed):    Additional Information about above appointments (if needed):    1: Dr. Xochitl Huerta  2:   3:     Other comments or requests:     Met with patient face to face and provided the patient with provider referral information, however patient prefers to call us once they are discharged.   Inpatient Referral Program [phone]   APPTS ARE READY TO BE MADE: [X] YES    Best Family or Patient Contact (if needed):    Additional Information about above appointments (if needed):    1: Dr. Xochitl Huerta  2:   3:     Other comments or requests:     Met with patient face to face and provided the patient with provider referral information, however patient prefers to call us once they are discharged. Spoke with patient's son.  Inpatient Referral Program [phone]

## 2024-08-26 NOTE — DISCHARGE NOTE PROVIDER - HOSPITAL COURSE
HPI:  74 y/o M, PMH CAD w/5 stents, HTN, HLD, recent Dx of gastric adenocarcinoma, Kazakh speaking, presents with burning epigastric pain associated with eating x 2 days, shortness of breath on exertion x 2 weeks. He was in his usual state of health until 2 weeks ago when he developed diffuse burning abdominal discomfort and constipation while in [facility]. He noticed shortness of breath when walking fast. He reports episodes of black stools on 8/1 and 8/4. He saw a physician there who found occult blood in his stool. He was referred for endoscopy on 8/12, which showed a bleeding gastric ulcer, biopsy was taken and hemostasis achieved. Biopsy report reveals gastric adenocarcinoma. Further imaging for staging revealed metastasis to liver and adjacent lymph nodes. He had one episode of black stools the day after endoscopy and normal stools since then. He elected to pursue further care here in the [place], and deferred any treatment in [facility].     He reports two episodes of nausea last week, states he had an episode of chills and diffuse burning sensation in his back after receiving IV contrast during his CT scan in [facility]. Son states he received 4 "bags" of blood products for low hemoglobin in [facility]. He currently denies any symptoms at rest, denies any fever, significant weight loss, chest pain, vomiting, dysuria, back pain, or diarrhea. His last bowel movement was yesterday, yellow and brown in color.     He grew up in [facility], worked as , was a former smoker for 20 years and quit in 1986.   [place] cardiac st[place] the [place], 2011 x2, 2013 x1, 2014 x1, 2023x1.    In the ED, Hgb 12.8, Bili 1.5, Alk Phos 978, , ALT 71, lactate 5.4. CT/ CTA negative for PE or pulmonary mets, shows many hepatics mets and gastrohepatic lymphadenopathy.    (16 Aug 2024 15:23)    Hospital Course:  Pt was admitted to medicine, found to have hyponatremia likely 2/2 SiADH. Onc and GI consulted and following. Family discussion held on 8/20 led by onc and palliative care discussing pt's poor candidacy for chemotherapy considering worsening liver function due to metastatic liver tumor burden. Family agrees to IR guided biopsy of liver mets to obtain biopsy for diagnosis confirmation as well as to assess eligibility for immunomodulatory therapy. IR guided biopsy of liver mets performed on 8/21 without complication.    Important Medication Changes and Reason:    Active or Pending Issues Requiring Follow-up:    The patient is afebrile, hemodynamically stable and medically optimized for discharge with follow up with PCP. On day of discharge, patient is clinically stable with no new exam findings or acute symptoms compared to prior. The patient was seen by the attending physician on the date of discharge and deemed stable and acceptable for discharge. The patient's chronic medical conditions were treated accordingly per the patient's home medication regimen. The patient's medication reconciliation (with changes made to chronic medications), follow up appointments, discharge orders, instructions, and significant lab and diagnostic studies are as noted.  Advanced Directives:   [ ] Full code  [ ] DNR  [ ] Hospice    Discharge Diagnoses:

## 2024-08-26 NOTE — PROGRESS NOTE ADULT - PROBLEM SELECTOR PLAN 7
5 stents, 2011 x2, 2013 x1, 2014 x1, 2023x1.    -Will hold lipitor 40mg in setting of elevated LFTs  -Will hold DAPT in anticipation of IR biopsy of liver mets. 5 stents, 2011 x2, 2013 x1, 2014 x1, 2023x1.    -Will hold lipitor 40mg in setting of elevated LFTs  -C/w hold DAPT given stents in 2023, nalini   - Resume asa 8/26

## 2024-08-26 NOTE — PROGRESS NOTE ADULT - SUBJECTIVE AND OBJECTIVE BOX
SUBJECTIVE AND OBJECTIVE: pt seen and examined at bedside. pt awake, oob to chair.   Indication for Geriatrics and Palliative Care Services/INTERVAL HPI: goc    OVERNIGHT EVENTS: no acute events o/n     DNR on chart:  Allergies    No Known Allergies    Intolerances    MEDICATIONS  (STANDING):  aspirin  chewable 81 milliGRAM(s) Oral daily  enoxaparin Injectable 40 milliGRAM(s) SubCutaneous every 24 hours  finasteride 5 milliGRAM(s) Oral daily  levothyroxine 50 MICROGram(s) Oral daily  pantoprazole    Tablet 40 milliGRAM(s) Oral every 12 hours  polyethylene glycol 3350 17 Gram(s) Oral daily  senna 2 Tablet(s) Oral at bedtime  tamsulosin 0.4 milliGRAM(s) Oral at bedtime    MEDICATIONS  (PRN):  acetaminophen     Tablet .. 650 milliGRAM(s) Oral every 6 hours PRN Temp greater or equal to 38C (100.4F), Mild Pain (1 - 3)  aluminum hydroxide/magnesium hydroxide/simethicone Suspension 30 milliLiter(s) Oral every 4 hours PRN Dyspepsia  melatonin 3 milliGRAM(s) Oral at bedtime PRN Insomnia  ondansetron Injectable 4 milliGRAM(s) IV Push every 8 hours PRN Nausea and/or Vomiting  oxyCODONE    IR 10 milliGRAM(s) Oral every 4 hours PRN Severe Pain (7 - 10)  oxyCODONE    IR 5 milliGRAM(s) Oral every 4 hours PRN Moderate Pain (4 - 6)  simethicone 80 milliGRAM(s) Chew every 8 hours PRN Heartburn      ITEMS UNCHECKED ARE NOT PRESENT    PRESENT SYMPTOMS: [ ]Unable to self-report - see [ ] CPOT [ ] PAINADS [ ] RDOS  Source if other than patient:  [ ]Family   [ ]Team     Pain:  [ ]yes [x ]no- no pain on exam, when pain is present pain in epigastric region, pt also complaining of gas pain   QOL impact -   Location -                    Aggravating factors -  Quality -  Radiation -  Timing-  Severity (0-10 scale):  Minimal acceptable level (0-10 scale):     CPOT:    https://www.[facility].org/getattachment/[email or web address]/Critical-Care-Pain-Observation-Tool-(CPOT)    PAINAD Score: See PAINAD tool and score below       Dyspnea:                           [ ]Mild [ ]Moderate [ ]Severe NONE     RDOS: See RDOS tool and score below   0 to 2  minimal or no respiratory distress   3  mild distress  4 to 6 moderate distress  >7 severe distress      Anxiety:                             [ ]Mild [ ]Moderate [ ]Severe  Fatigue:                             [ ]Mild [ ]Moderate [ ]Severe  Nausea:                             [ ]Mild [ ]Moderate [ ]Severe  Loss of appetite:              [ ]Mild [ ]Moderate [ ]Severe  Constipation:                    [ ]Mild [ ]Moderate [ ]Severe    PCSSQ[Palliative Care Spiritual Screening Question]   Severity (0-10):  Score of 4 or > indicate consideration of Chaplaincy referral.  Chaplaincy Referral: [ ] yes [ ] refused [ ] following [ ] Deferred     Caregiver [place]? : [ ] yes [ ] no [ ] Deferred [ ] Declined             Social work referral [ ] Patient & Family Centered Care Referral [ ]     Anticipatory Grief present?:  [ ] yes [ ] no  [ ] Deferred                  Social work referral [ ] Chaplaincy Referral [ ]    		  Other Symptoms:  [x ]All other review of systems negative     Palliative Performance Status Version 2:   See PPSv2 tool and score below         PHYSICAL EXAM:  Vital Signs Last 24 Hrs  T(C): 36.5 (26 Aug 2024 04:42), Max: 36.6 (25 Aug 2024 20:30)  T(F): 97.7 (26 Aug 2024 04:42), Max: 97.9 (25 Aug 2024 20:30)  HR: 75 (26 Aug 2024 04:42) (70 - 75)  BP: 121/73 (26 Aug 2024 04:42) (121/73 - 134/79)  BP(mean): --  RR: 18 (26 Aug 2024 04:42) (18 - 18)  SpO2: 96% (26 Aug 2024 04:42) (96% - 97%)    Parameters below as of 26 Aug 2024 04:42  Patient On (Oxygen Delivery Method): room air     I&O's Summary     GENERAL: [ ]Cachexia    [x ]Alert  [x ]Oriented x3   [ ]Lethargic  [ ]Unarousable  [ ]Verbal  [ ]Non-Verbal  Behavioral:   [ ]Anxiety  [ ]Delirium [ ]Agitation [ ]Other  HEENT:  [ ]Normal   [ x]Dry mouth   [ ]ET Tube/Trach  [ ]Oral lesions  PULMONARY:   [ ]Clear [ ]Tachypnea  [ ]Audible excessive secretions   [ ]Rhonchi        [ ]Right [ ]Left [ ]Bilateral  [ ]Crackles        [ ]Right [ ]Left [ ]Bilateral  [ ]Wheezing     [ ]Right [ ]Left [ ]Bilateral  [x ]Diminished BS [ ] Right [ ]Left [x ]Bilateral  CARDIOVASCULAR:    [ x]Regular [ ]Irregular [ ]Tachy  [ ]Galdino [ ]Murmur [ ]Other  GASTROINTESTINAL:  [ ]Soft  [ x]Distended   [x ]+BS  [ ]Non tender [ x]Tender  [ ]Other [ ]PEG [ ]OGT/ NGT   Last BM: 8/26   GENITOURINARY:  [ ]Normal [ ]Incontinent   [ ]Oliguria/Anuria   [ ]Morales  MUSCULOSKELETAL:   [ ]Normal   [x ]Weakness  [ x]Bed/Wheelchair bound [ ]Edema  NEUROLOGIC:   [ x]No focal deficits  [ ] Cognitive impairment  [ ] Dysphagia [ ]Dysarthria [ ] Paresis [ ]Other   SKIN:   [ ]Normal  [ ]Rash  [ ]Other  [ ]Pressure ulcer(s) [ ]y [ ]n present on admission    CRITICAL CARE:  [ ]Shock Present  [ ]Septic [ ]Cardiogenic [ ]Neurologic [ ]Hypovolemic  [ ]Vasopressors [ ]Inotropes  [ ]Respiratory failure present [ ]Mechanical Ventilation [ ]Non-invasive ventilatory support [ ]High-Flow   [ ]Acute  [ ]Chronic [ ]Hypoxic  [ ]Hypercarbic [ ]Other  [ ]Other organ failure     LABS:                        10.6   6.98  )-----------( 185      ( 26 Aug 2024 07:31 )             30.0   08-26    126<L>  |  87<L>  |  19  ----------------------------<  72  4.3   |  19<L>  |  0.93    Ca    9.0      26 Aug 2024 07:32  Phos  2.8     08-26  Mg     2.1     08-26    TPro  6.5  /  Alb  2.7<L>  /  TBili  9.0<H>  /  DBili  x   /  AST  239<H>  /  ALT  125<H>  /  AlkPhos  853<H>  08-26      Urinalysis Basic - ( 26 Aug 2024 07:32 )    Color: x / Appearance: x / SG: x / pH: x  Gluc: 72 mg/dL / Ketone: x  / Bili: x / Urobili: x   Blood: x / Protein: x / Nitrite: x   Leuk Esterase: x / RBC: x / WBC x   Sq Epi: x / Non Sq Epi: x / Bacteria: x      RADIOLOGY & ADDITIONAL STUDIES:  < from: US Abdomen Limited (08.25.24 @ 14:26) >    ACC: [id number] EXAM:  US ABDOMEN LIMITED   ORDERED BY:  MARCOS OZUNA     PROCEDURE DATE:  08/25/2024          INTERPRETATION:  CLINICAL INFORMATION: 73-year-old male with 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    FINDINGS:  RUQ: None  RLQ: Trace  LUQ: None  LLQ: Small volume    Partially imaged enlarged liver with multiple metastases.    IMPRESSION:  Small volume ascites fluid in the left lower quadrant and trace ascites   in the right lower quadrant. No ascites in the upper quadrants.      --- End of Report ---            QUENTIN PAYNE; Attending Radiologist  This document has been electronically signed. Aug 25 2024  4:10PM    < end of copied text >    Protein Calorie Malnutrition Present: [ ]mild [ ]moderate [ ]severe [ ]underweight [ ]morbid obesity  https://www.andeal.org/vault/2440/web/files/ONC/Table_Clinical%20Characteristics%20to%20Document%20Malnutrition-White%20JV%20et%20al%202012.pdf    Height (cm): 165.1 (08-22-24 @ 08:55)  Weight (kg): 68 (08-22-24 @ 08:55)  BMI (kg/m2): 24.9 (08-22-24 @ 08:55)    [ x]PPSV2 < or = 30%  [ ]significant weight loss [ ]poor nutritional intake [ ]anasarca[ ]Artificial Nutrition    Other REFERRALS:  [ ]Hospice  [ ]Child Life  [ ]Social Work  [ ]Case management [ ]Holistic Therapy     Goals of Care Document:

## 2024-08-26 NOTE — PROGRESS NOTE ADULT - SUBJECTIVE AND OBJECTIVE BOX
DATE OF SERVICE: 08-26-24 @ 08:24    Patient is a 73y old  Male who presents with a chief complaint of Gastric adenocarcinoma (26 Aug 2024 05:11)      SUBJECTIVE / OVERNIGHT EVENTS:  Overnight,  Pt seen and examined at bedside.    ROS negative except as above.    MEDICATIONS  (STANDING):  enoxaparin Injectable 40 milliGRAM(s) SubCutaneous every 24 hours  finasteride 5 milliGRAM(s) Oral daily  levothyroxine 50 MICROGram(s) Oral daily  pantoprazole  Injectable 40 milliGRAM(s) IV Push two times a day  polyethylene glycol 3350 17 Gram(s) Oral daily  senna 2 Tablet(s) Oral at bedtime  tamsulosin 0.4 milliGRAM(s) Oral at bedtime    MEDICATIONS  (PRN):  acetaminophen     Tablet .. 650 milliGRAM(s) Oral every 6 hours PRN Temp greater or equal to 38C (100.4F), Mild Pain (1 - 3)  aluminum hydroxide/magnesium hydroxide/simethicone Suspension 30 milliLiter(s) Oral every 4 hours PRN Dyspepsia  melatonin 3 milliGRAM(s) Oral at bedtime PRN Insomnia  morphine  - Injectable 4 milliGRAM(s) IV Push every 4 hours PRN Severe Pain (7 - 10)  ondansetron Injectable 4 milliGRAM(s) IV Push every 8 hours PRN Nausea and/or Vomiting  oxyCODONE    IR 10 milliGRAM(s) Oral every 4 hours PRN Moderate Pain (4 - 6)  simethicone 80 milliGRAM(s) Chew every 8 hours PRN Heartburn      Vital Signs Last 24 Hrs  T(C): 36.5 (26 Aug 2024 04:42), Max: 36.6 (25 Aug 2024 20:30)  T(F): 97.7 (26 Aug 2024 04:42), Max: 97.9 (25 Aug 2024 20:30)  HR: 75 (26 Aug 2024 04:42) (70 - 75)  BP: 121/73 (26 Aug 2024 04:42) (121/73 - 134/79)  BP(mean): --  RR: 18 (26 Aug 2024 04:42) (18 - 18)  SpO2: 96% (26 Aug 2024 04:42) (96% - 97%)    Parameters below as of 26 Aug 2024 04:42  Patient On (Oxygen Delivery Method): room air      CAPILLARY BLOOD GLUCOSE        I&O's Summary      PHYSICAL EXAM:  GENERAL: NAD, well-developed  HEAD:  Atraumatic, Normocephalic  EYES: EOMI, conjunctiva and sclera clear  NECK: Supple, No JVD  CHEST/LUNG: Clear to auscultation bilaterally; No wheeze  HEART: Regular rate and rhythm; No murmurs, rubs, or gallops  ABDOMEN: Soft, Nontender, Nondistended; Bowel sounds present  EXTREMITIES:  2+ Peripheral Pulses, No clubbing, cyanosis, or edema  NEUROLOGY: AOx3, non-focal  SKIN: No rashes or lesions    LABS:                        10.6   6.98  )-----------( 185      ( 26 Aug 2024 07:31 )             30.0     08-25    127<L>  |  92<L>  |  19  ----------------------------<  75  4.0   |  20<L>  |  0.87    Ca    8.9      25 Aug 2024 06:35  Phos  3.5     08-25  Mg     2.0     08-25    TPro  6.1  /  Alb  2.7<L>  /  TBili  8.1<H>  /  DBili  x   /  AST  213<H>  /  ALT  114<H>  /  AlkPhos  828<H>  08-25            MICRO:      RADIOLOGY & ADDITIONAL TESTS:           DATE OF SERVICE: 08-26-24 @ 08:24    Patient is a 73y old  Male who presents with a chief complaint of Gastric adenocarcinoma (26 Aug 2024 05:11)      SUBJECTIVE / OVERNIGHT EVENTS:  Overnight, pt had 3 episodes watery diarrhea. 1 melanotic stool.   Pt seen and examined at bedside.    ROS negative except as above.    MEDICATIONS  (STANDING):  enoxaparin Injectable 40 milliGRAM(s) SubCutaneous every 24 hours  finasteride 5 milliGRAM(s) Oral daily  levothyroxine 50 MICROGram(s) Oral daily  pantoprazole  Injectable 40 milliGRAM(s) IV Push two times a day  polyethylene glycol 3350 17 Gram(s) Oral daily  senna 2 Tablet(s) Oral at bedtime  tamsulosin 0.4 milliGRAM(s) Oral at bedtime    MEDICATIONS  (PRN):  acetaminophen     Tablet .. 650 milliGRAM(s) Oral every 6 hours PRN Temp greater or equal to 38C (100.4F), Mild Pain (1 - 3)  aluminum hydroxide/magnesium hydroxide/simethicone Suspension 30 milliLiter(s) Oral every 4 hours PRN Dyspepsia  melatonin 3 milliGRAM(s) Oral at bedtime PRN Insomnia  morphine  - Injectable 4 milliGRAM(s) IV Push every 4 hours PRN Severe Pain (7 - 10)  ondansetron Injectable 4 milliGRAM(s) IV Push every 8 hours PRN Nausea and/or Vomiting  oxyCODONE    IR 10 milliGRAM(s) Oral every 4 hours PRN Moderate Pain (4 - 6)  simethicone 80 milliGRAM(s) Chew every 8 hours PRN Heartburn      Vital Signs Last 24 Hrs  T(C): 36.5 (26 Aug 2024 04:42), Max: 36.6 (25 Aug 2024 20:30)  T(F): 97.7 (26 Aug 2024 04:42), Max: 97.9 (25 Aug 2024 20:30)  HR: 75 (26 Aug 2024 04:42) (70 - 75)  BP: 121/73 (26 Aug 2024 04:42) (121/73 - 134/79)  BP(mean): --  RR: 18 (26 Aug 2024 04:42) (18 - 18)  SpO2: 96% (26 Aug 2024 04:42) (96% - 97%)    Parameters below as of 26 Aug 2024 04:42  Patient On (Oxygen Delivery Method): room air      CAPILLARY BLOOD GLUCOSE        I&O's Summary      PHYSICAL EXAM:  GENERAL: NAD, well-developed  HEAD:  Atraumatic, Normocephalic  EYES: EOMI, conjunctiva and sclera clear  NECK: Supple, No JVD  CHEST/LUNG: Clear to auscultation bilaterally; No wheeze  HEART: Regular rate and rhythm; No murmurs, rubs, or gallops  ABDOMEN: Soft, Nontender, Nondistended; Bowel sounds present  EXTREMITIES:  2+ Peripheral Pulses, No clubbing, cyanosis, or edema  NEUROLOGY: AOx3, non-focal  SKIN: No rashes or lesions    LABS:                        10.6   6.98  )-----------( 185      ( 26 Aug 2024 07:31 )             30.0     08-25    127<L>  |  92<L>  |  19  ----------------------------<  75  4.0   |  20<L>  |  0.87    Ca    8.9      25 Aug 2024 06:35  Phos  3.5     08-25  Mg     2.0     08-25    TPro  6.1  /  Alb  2.7<L>  /  TBili  8.1<H>  /  DBili  x   /  AST  213<H>  /  ALT  114<H>  /  AlkPhos  828<H>  08-25            MICRO:      RADIOLOGY & ADDITIONAL TESTS:

## 2024-08-26 NOTE — DISCHARGE NOTE PROVIDER - NSDCMRMEDTOKEN_GEN_ALL_CORE_FT
amLODIPine 5 mg oral tablet: 1 tab(s) orally once a day  Coreg 3.125 mg oral tablet: 1 tab(s) orally once a day  finasteride 5 mg oral tablet: 1 tab(s) orally once a day  levothyroxine 50 mcg (0.05 mg) oral tablet: 1 tab(s) orally once a day In morning on empty stomach  Lipitor 40 mg oral tablet: 1 tab(s) orally once a day  tamsulosin 0.4 mg oral capsule: 1 cap(s) orally once a day

## 2024-08-26 NOTE — PROGRESS NOTE ADULT - PROBLEM SELECTOR PLAN 3
Detail Level: Simple Detail Level: Zone Bilirubin Total: 8.1 <- 7.2 <- 7.0 <- 5.9 <- 5.0 <- 4.2 <- 3.3 <- 3.0 <- 1.5admission  Alkaline Phosphatase: 828 <- 797 <- 785 <- 789 <- 849 <- 797 <- 785  AST: 213 <- 194 <- 214 <- 204 <- 248 <- 180 <- 158  ALT: 114 <- 108 <- 114 <- 107 <- 121 <- 103 <- 91  Suspected to be due to metastatic infiltration from multiple mets  Overall trend is upwards.    -GI and Onc following  -trend CMP

## 2024-08-26 NOTE — DISCHARGE NOTE PROVIDER - NSDCCPCAREPLAN_GEN_ALL_CORE_FT
PRINCIPAL DISCHARGE DIAGNOSIS  Diagnosis: Metastatic adenocarcinoma to stomach  Assessment and Plan of Treatment: You were diagnosied with Gastric Adenocarcinoma with metastatis to liver and adjacent lymph nodes. You had a IR guided biopsy of the metastatic tumors in your liver, which were sent for biopsy and genotyping.      SECONDARY DISCHARGE DIAGNOSES  Diagnosis: Metastatic adenocarcinoma to stomach  Assessment and Plan of Treatment:

## 2024-08-26 NOTE — PROGRESS NOTE ADULT - ATTENDING COMMENTS
74 y/o M, PMH CAD w/5 stents, HTN, HLD, recent Dx of gastric adenocarcinoma, Faroese speaking, presents with burning epigastric pain associated with eating, shortness of breath on exertion x 2 weeks. He was evaluated in [facility] with endoscopy for the abdominal pain, constipation and black stools on 8/12, which showed a bleeding gastric ulcer, biopsy was taken and hemostasis achieved, Biopsy positive for gastric adenocarcinoma. CT imaging revealed metastasis to liver and adjacent lymph nodes. He elected to pursue further care here in the [place], and deferred any treatment in [facility]. s/p liver biopsy 8/20.     Patient is not a candidate for  chemotherapy given elevated bilirubin. Pending liver biopsy, he may be a candidate for immunotherapy.     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

## 2024-08-26 NOTE — PROGRESS NOTE ADULT - ASSESSMENT
74 y/o M, PMH CAD w/5 stents, HTN, HLD, recent Dx of gastric adenocarcinoma, Czech speaking, presents with burning epigastric pain associated with eating, shortness of breath on exertion x 2 weeks. He was evaluated in [facility] with endoscopy for the abdominal pain, constipation and black stools on 8/12, which showed a bleeding gastric ulcer, biopsy was taken and hemostasis achieved, Biopsy positive for gastric adenocarcinoma. CT imaging revealed metastasis to liver and adjacent lymph nodes. He elected to pursue further care here in the [place], and deferred any treatment in [facility]. Awaiting liver biopsy 8/20. (Taken from internal medicine note). Palliative consulted for GOC.

## 2024-08-27 NOTE — OCCUPATIONAL THERAPY INITIAL EVALUATION ADULT - ADDITIONAL COMMENTS
Pt lives in apartment with family, 3 steps to enter +HR and ~1 flight to 2nd level of apartment complex, once in apartment main level set up, both tub and walk in shower, and family able to assist/supervise as needed.  independent with ADLs prior to admission, ambulated independently with no assistive devices.

## 2024-08-27 NOTE — PROGRESS NOTE ADULT - ATTENDING COMMENTS
74 y/o M, PMH CAD w/5 stents, HTN, HLD, recent Dx of gastric adenocarcinoma, Persian speaking, presents with burning epigastric pain associated with eating, shortness of breath on exertion x 2 weeks. He was evaluated in [facility] with endoscopy for the abdominal pain, constipation and black stools on 8/12, which showed a bleeding gastric ulcer, biopsy was taken and hemostasis achieved, Biopsy positive for gastric adenocarcinoma. CT imaging revealed metastasis to liver and adjacent lymph nodes. He elected to pursue further care here in the [place], and deferred any treatment in [facility]. s/p liver biopsy 8/20.     Patient is not a candidate for  chemotherapy given elevated bilirubin. Pending liver biopsy, he may be a candidate for immunotherapy.     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 74 y/o M, PMH CAD w/5 stents, HTN, HLD, recent Dx of gastric adenocarcinoma, Yakut speaking, presents with burning epigastric pain associated with eating, shortness of breath on exertion x 2 weeks. He was evaluated in [facility] with endoscopy for the abdominal pain, constipation and black stools on 8/12, which showed a bleeding gastric ulcer, biopsy was taken and hemostasis achieved, Biopsy positive for gastric adenocarcinoma. CT imaging revealed metastasis to liver and adjacent lymph nodes. He elected to pursue further care here in the [place], and deferred any treatment in [facility]. s/p liver biopsy 8/20.     Patient is not a candidate for  chemotherapy given elevated bilirubin. Pending liver biopsy, he may be a candidate for immunotherapy.     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

## 2024-08-27 NOTE — PROGRESS NOTE ADULT - PROBLEM SELECTOR PLAN 6
2/2 thrombophlebitis. Small 3cm circular area of redness with associated induration and TTP over dorsum of L hand, no streaking, no spreading redness, no fluctuance, Sensorimotor function of L hand intact. Reports frequent needle sticks in L hand during recent hospitalization in [facility].  -L hand U/S of soft tissue neg for abscess  -warm compresses prn

## 2024-08-27 NOTE — CONSULT NOTE ADULT - SUBJECTIVE AND OBJECTIVE BOX
[facility]  - Ans Serv [phone], Office [phone]  Dr Johnson/Dr Graandos /Dr Ac floyd /Dr ALLY Holbrook/Dr Elvin Georges/Dr Esteban Guzman /Dr CONSUELO Dhillon  _______________________________________________________________________________________________  The patient seen and examined today.  HPI:    Patient is a 73 year old male with PMH of CAD w/5 stents, HTN, HLD, recent Dx of gastric adenocarcinoma, Persian speaking, presents with burning epigastric pain associated with eating x 2 days, shortness of breath on exertion x 2 weeks. Hospital course thus farhas been complicated by coffee ground emesis and abnormal liver enzymes. Pt underwent for IR guided biopsy of liver mets 8/22. The nephrology team was consulted for hyponatremia. The patient was seen and examined this morning accompanied by his son at bedside who helped provide history. He reported the patient has been having poor PO intake due to the abdominal dicomfort associated with eating. Denied any episodes of vomiting. Has not been drinking too much water. He denied any chest pain or shortness of breath.     PAST MEDICAL & SURGICAL HISTORY:  Hyperlipidemia      History of BPH      Hypertension      CAD (coronary artery disease)      Hypothyroid      Stented coronary artery      No significant past surgical history        Allergies :- No Known Allergies    Home Medications Reviewed  Hospital Medications:   MEDICATIONS  (STANDING):  aspirin  chewable 81 milliGRAM(s) Oral daily  enoxaparin Injectable 40 milliGRAM(s) SubCutaneous every 24 hours  finasteride 5 milliGRAM(s) Oral daily  levothyroxine 50 MICROGram(s) Oral daily  pantoprazole    Tablet 40 milliGRAM(s) Oral every 12 hours  polyethylene glycol 3350 17 Gram(s) Oral daily  senna 2 Tablet(s) Oral at bedtime  tamsulosin 0.4 milliGRAM(s) Oral at bedtime    SOCIAL HISTORY:  Denies ETOh,Smoking,   FAMILY HISTORY:  No pertinent family history in first degree relatives        REVIEW OF SYSTEMS:  As mentioned above.     VITALS:  T(F): 97.9 (08-27-24 @ 05:56), Max: 98.7 (08-26-24 @ 13:47)  HR: 74 (08-27-24 @ 05:56)  BP: 118/69 (08-27-24 @ 05:56)  RR: 18 (08-27-24 @ 05:56)  SpO2: 96% (08-27-24 @ 05:56)  Wt(kg): --        PHYSICAL EXAM:  Constitutional: ill appearing, frail  HEENT: anicteric sclera, oropharynx clear, MMM  Neck: supple.   Respiratory: Bilateral equal breath sounds , no wheezes, no crackles  Cardiovascular: S1, S2, Regular  Gastrointestinal: tender to palpation  Extremities: No peripheral edema  Neurological: awake and alert    Data:  08-27    124<L>  |  88<L>  |  20  ----------------------------<  73  4.5   |  19<L>  |  0.92    Ca    9.1      27 Aug 2024 07:12  Phos  2.4     08-27  Mg     2.1     08-27    TPro  6.4  /  Alb  2.9<L>  /  TBili  9.8<H>  /  DBili      /  AST  267<H>  /  ALT  128<H>  /  AlkPhos  880<H>  08-27    Creatinine Trend: 0.92 <--, 0.93 <--, 0.87 <--, 0.85 <--, 0.79 <--, 0.85 <--, 0.87 <--, 0.90 <--, 0.94 <--, 0.97 <--, 1.02 <--                        10.8   6.11  )-----------( 199      ( 27 Aug 2024 07:12 )             32.2     Urine Studies:  Urinalysis Basic - ( 27 Aug 2024 07:12 )    Color:  / Appearance:  / SG:  / pH:   Gluc: 73 mg/dL / Ketone:   / Bili:  / Urobili:    Blood:  / Protein:  / Nitrite:    Leuk Esterase:  / RBC:  / WBC    Sq Epi:  / Non Sq Epi:  / Bacteria:       Sodium, Random Urine: 32 mmol/L (08-26 @ 21:02)  Osmolality, Random Urine: 404 mos/kg (08-26 @ 21:02)  Sodium, Random Urine: 68 mmol/L (08-23 @ 10:51)  Osmolality, Random Urine: 471 mos/kg (08-23 @ 10:51)  Potassium, Random Urine: 44 mmol/L (08-23 @ 10:51)  Sodium, Random Urine: 18 mmol/L (08-20 @ 12:23)  Osmolality, Random Urine: 598 mos/kg (08-20 @ 12:23)

## 2024-08-27 NOTE — OCCUPATIONAL THERAPY INITIAL EVALUATION ADULT - PERTINENT HX OF CURRENT PROBLEM, REHAB EVAL
72 y/o M, PMH CAD w/5 stents, HTN, HLD, recent Dx of gastric adenocarcinoma, Arabic speaking, presents with burning epigastric pain associated with eating x 2 days, shortness of breath on exertion x 2 weeks. He was in his usual state of health until 2 weeks ago when he developed diffuse burning abdominal discomfort and constipation while in [facility]. He noticed shortness of breath when walking fast. He reports episodes of black stools on 8/1 and 8/4. He saw a physician there who found occult blood in his stool. He was referred for endoscopy on 8/12, which showed a bleeding gastric ulcer, biopsy was taken and hemostasis achieved. Biopsy report reveals gastric adenocarcinoma. Further imaging for staging revealed metastasis to liver and adjacent lymph nodes. He had one episode of black stools the day after endoscopy and normal stools since then. He elected to pursue further care here in the [place], and deferred any treatment in [facility].   He reports two episodes of nausea last week, states he had an episode of chills and diffuse burning sensation in his back after receiving IV contrast during his CT scan in [facility]. Son states he received 4 "bags" of blood products for low hemoglobin in [facility]. He currently denies any symptoms at rest, denies any fever, significant weight loss, chest pain, vomiting, dysuria, back pain, or diarrhea. His last bowel movement was yesterday, yellow and brown in color.   He grew up in [facility], worked as , was a former smoker for 20 years and quit in 1986.   [place] cardiac [place], [place] x2, 2013 x1, 2014 x1, 2023x1.  In the ED, Hgb 12.8, Bili 1.5, Alk Phos 978, , ALT 71, lactate 5.4. CT/ CTA negative for PE or pulmonary mets, shows many hepatics mets and gastrohepatic lymphadenopathy.

## 2024-08-27 NOTE — PHYSICAL THERAPY INITIAL EVALUATION ADULT - PERTINENT HX OF CURRENT PROBLEM, REHAB EVAL
72 y/o M, PMH CAD w/5 stents, HTN, HLD, recent Dx of gastric adenocarcinoma, Upper sorbian speaking, presents with burning epigastric pain associated with eating, shortness of breath on exertion x 2 weeks. He was evaluated in [facility] with endoscopy for the abdominal pain, constipation and black stools on 8/12, which showed a bleeding gastric ulcer, biopsy was taken and hemostasis achieved, Biopsy positive for gastric adenocarcinoma. CT imaging revealed metastasis to liver and adjacent lymph nodes. He elected to pursue further care here in the [place], and deferred any treatment in [facility].GOC discussion with Onc, palliative care, and family on 8/21, family agrees to biopsy either via IR guided biopsy of liver mets or EGD biopsy of primary gastric tumor, to genotype tumor to assess any possible immunomodulatory therapy. Code status discussion started, palliative care  to follow up with family today regarding code status/molst.   Pt went for IR guided biopsy of liver mets 8/22 AM.

## 2024-08-27 NOTE — PROGRESS NOTE ADULT - PROBLEM SELECTOR PLAN 1
Biopsy proven in [facility] from endoscopy on 8/12/24.   Hospital course with Episodes of coffee ground emesis on 8/19, and dark stools 8/19-8/20, associated with increasing Tbili, transaminanses, and ALP levels.    GOC discussion with Onc, palliative care, and family on 8/21, family agrees to biopsy either via IR guided biopsy of liver mets or EGD biopsy of primary gastric tumor, to genotype tumor to assess any possible immunomodulatory therapy. Code status discussion started, palliative care  to follow up with family today regarding code status/molst.   Pt went for IR guided biopsy of liver mets 8/22 AM.    - c/w Pantoprazole 40mg PO BID  -Onc following  -GI following,  -Palliative Care following  - F/U biopsy results - expedited Biopsy proven in [facility] from endoscopy on 8/12/24.   Hospital course with Episodes of coffee ground emesis on 8/19, and dark stools 8/19-8/20, associated with increasing Tbili, transaminanses, and ALP levels.  - IR bx 8/22  - [facility] palliative - pending     Plan:   - Pantoprazole 40mg PO BID  -Onc following  -GI following,  -Palliative Care following  - F/U biopsy results - expedited Biopsy proven in [facility] from endoscopy on 8/12/24.   Hospital course with Episodes of coffee ground emesis on 8/19, and dark stools 8/19-8/20, associated with increasing Tbili, transaminanses, and ALP levels.  - IR bx 8/22  - [facility] palliative - pending     Plan:   - Pantoprazole 40mg PO BID  -Onc following, appreciate recs   -Palliative Care following, appreciate recs   - F/U biopsy results - expedited  - Awaiting MMR results from specimen

## 2024-08-27 NOTE — PROGRESS NOTE ADULT - PROBLEM SELECTOR PLAN 3
Bilirubin Total: 8.1 <- 7.2 <- 7.0 <- 5.9 <- 5.0 <- 4.2 <- 3.3 <- 3.0 <- 1.5admission  Alkaline Phosphatase: 828 <- 797 <- 785 <- 789 <- 849 <- 797 <- 785  AST: 213 <- 194 <- 214 <- 204 <- 248 <- 180 <- 158  ALT: 114 <- 108 <- 114 <- 107 <- 121 <- 103 <- 91  Suspected to be due to metastatic infiltration from multiple mets  Overall trend is upwards.    -GI and Onc following  -trend CMP Bilirubin Total: 9.8 <- 1.5admission  Alkaline Phosphatase: 853 <- 828 <- 785  AST: 239 <- 158  ALT: 125 <- 91  Suspected to be due to metastatic infiltration from multiple mets  Overall trend is upwards.    Plan:  - RUQ US to evaluate bili   - Trend CMP  - Pending liver bx results

## 2024-08-27 NOTE — OCCUPATIONAL THERAPY INITIAL EVALUATION ADULT - NSOTDISCHREC_GEN_A_CORE
Progress Notes by Kris Green DO at 06/16/18 09:21 AM     Author:  Kris Green DO Service:  (none) Author Type:  Physician     Filed:  06/16/18 10:12 AM Encounter Date:  6/16/2018 Status:  Signed     :  Kris Green DO (Physician)            Claudia is a 60 year old female who [facility] for eye redness and sweling  to[CI1.1T] right lower[CI1.1M] eyelid.  Symptoms have been present for[CI1.1T] 2[CI1.1M] days.   Denies any change in visual acuity or foreign body sensation.  Contact lens  -[CI1.1T]  no[CI1.1M].  No significant cold symptoms or known sick contacts.    ROS- negative  Patient Active Problem List    Diagnosis    • Unspecified essential hypertension   • Asymptomatic varicose veins   • Anxiety state, unspecified   • Sleep disturbance, unspecified   • DJD (degenerative joint disease) of knee   • Medial knee pain     Current outpatient prescriptions prior to encounter       Medication  Sig Dispense Refill   • alprazolam (XANAX) 0.25 MG tablet TAKE 1 TABLET BY MOUTH 3 TIMES A DAY AS NEEDED FOR ANXIETY 60 Tab 0   • losartan (COZAAR) 100 MG tablet TAKE 1 TAB BY MOUTH DAILY. 30 Tab 5   • metoprolol-hydrochlorothiazide (LOPRESSOR HCT) 100-25 MG per tablet TAKE 1 TAB BY MOUTH DAILY. 30 Tab 5   • amlodipine (NORVASC) 5 MG tablet TAKE 1 TAB BY MOUTH EVERY EVENING. 30 Tab 5   • doxazosin (CARDURA) 1 MG tablet TAKE 1 TAB BY MOUTH EVERY EVENING. 30 Tab 5   • valacyclovir (VALTREX) 500 MG tablet Take 1 Tab by mouth 3 (three) times daily. 21 Tab 2   • Blood Pressure Monitor MISC Check twice daily, Arm cuff 1 Each 0   • Aspirin (BABY ASPIRIN) 81 MG OR CHEW 1 TABLET DAILY         Objective:  No acute distress, nontoxic in appearance  NISHA, EOMI, fundi normal  conjunctivia- clear  Affected lid is red and swollen,     TM's -clear  Oropharynx- moist mucus membranes, airways patent  Neck exam within normal limits.    Assesment:[CI1.1T]  Blepharitis[CI1.1M]    Warm compress, NSAIDS  Plan- RX  eye drops as ordered.  Avoid contact lens if applicable - till symptoms resolve.  .Side effects of all medications were discussed. Risk of spread discussed. Patient is instructed to follow up in 2-3 days or as needed.  Patient is to go to the opthalmologist or ER for any significant change or worsening.  Patient was discharged in a stable condition and was in agreement with the plan.  No further questions.    Electronically Signed by:    Kris Green DO , 6/16/2018[CI1.1T]            Revision History        User Key Date/Time User Provider Type Action    > CI1.1 06/16/18 10:12 AM Kris Green DO Physician Sign    M - Manual, T - Template             Home with no OT needs. Patient appears to be at baseline for ADLs and functional mobility. Patient will not be placed on OT program. Please reconsult this discipline with any changes. Pt lives with family and is able to have supervision/assist at all times from family if needed, son at bedside and Pt educated on ADL modifications and energy conservation techniques

## 2024-08-27 NOTE — CONSULT NOTE ADULT - ASSESSMENT
73 year old male with PMH of CAD w/5 stents, HTN, HLD, recent Dx of gastric adenocarcinoma, Turkmen speaking, presents with burning epigastric pain associated with eating x 2 days, shortness of breath on exertion x 2 weeks. Hospital course thus farhas been complicated by coffee ground emesis and abnormal liver enzymes. Pt underwent for IR guided biopsy of liver mets 8/22. The nephrology team was consulted for hyponatremia.    Hyponatremia   likely in setting of poor solute intake and SIADH  abdominal pain stimulating ADH  urine lytes reviewed; consistent with same     plan   SNA further decreased to 124 this morning   will start the patient on HTS with 2%NS at 35cc/hr for 6 hours  check BMP q8h until SNa >130   also with low albumin in setting of his liver disease  --- following HTS may consider albumin infusions as well  encourage PO intake; can add protein shakes with meals   maintain free water limit  avoid hypotonic solutions   monitor serum sodium     If any questions, please feel free to contact me     Dandre Gaming  Nephrology Attending  Cell #[phone]

## 2024-08-27 NOTE — PROGRESS NOTE ADULT - PROBLEM SELECTOR PLAN 7
5 stents, 2011 x2, 2013 x1, 2014 x1, 2023x1.    -Will hold lipitor 40mg in setting of elevated LFTs  -C/w hold DAPT given stents in 2023, nalini   - Resume asa 8/26

## 2024-08-27 NOTE — PHYSICAL THERAPY INITIAL EVALUATION ADULT - ADDITIONAL COMMENTS
pt will be returning to 2nd floor apt with family. 3 steps to enter building +flight to second floor. Prior to admission, pt was I with all functional mobility and ADLs without AD. Pt does not own any equipment. Retired professor.

## 2024-08-27 NOTE — PHYSICAL THERAPY INITIAL EVALUATION ADULT - GAIT TRAINING, PT EVAL
Other (specify diet and fluid)
GOAL: Pt will ambulate 150ft with rolling walker independently in 4 weeks.

## 2024-08-27 NOTE — PROGRESS NOTE ADULT - PROBLEM SELECTOR PLAN 2
127 today, 128 yesterday, 128 on admission.  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   Received maintenance fluids LR yesterday x 12 hours.    -regular halal diet, no salt restrictions  -trend BMP.  -fluid restrict 1.5L/day  - Repeat urine osoms due to Na 126 Hyponatremic 127/128 since admit. 8/27 124. 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      Plan:  - Fluid restricted halal diet   - Hypertonic 2% NaCl x 6 hours, repeat BMP following   - Trend BMP q8 per nephro till na > 130  - Asymptomatic, persistent poor pO intake

## 2024-08-27 NOTE — PROGRESS NOTE ADULT - ASSESSMENT
72 y/o M, PMH CAD w/5 stents, HTN, HLD, recent Dx of gastric adenocarcinoma, Italian speaking, presents with burning epigastric pain associated with eating, shortness of breath on exertion x 2 weeks. He was evaluated in [facility] with endoscopy for the abdominal pain, constipation and black stools on 8/12, which showed a bleeding gastric ulcer, biopsy was taken and hemostasis achieved, Biopsy positive for gastric adenocarcinoma. CT imaging revealed metastasis to liver and adjacent lymph nodes. He elected to pursue further care here in the [place], and deferred any treatment in [facility]. Awaiting liver biopsy 8/20.

## 2024-08-27 NOTE — PROGRESS NOTE ADULT - SUBJECTIVE AND OBJECTIVE BOX
DATE OF SERVICE: 08-27-24 @ 07:54    Patient is a 73y old  Male who presents with a chief complaint of Gastric adenocarcinoma (26 Aug 2024 13:49)      SUBJECTIVE / OVERNIGHT EVENTS:  Overnight,  Pt seen and examined at bedside.    ROS negative except as above.    MEDICATIONS  (STANDING):  aspirin  chewable 81 milliGRAM(s) Oral daily  enoxaparin Injectable 40 milliGRAM(s) SubCutaneous every 24 hours  finasteride 5 milliGRAM(s) Oral daily  levothyroxine 50 MICROGram(s) Oral daily  pantoprazole    Tablet 40 milliGRAM(s) Oral every 12 hours  polyethylene glycol 3350 17 Gram(s) Oral daily  senna 2 Tablet(s) Oral at bedtime  tamsulosin 0.4 milliGRAM(s) Oral at bedtime    MEDICATIONS  (PRN):  acetaminophen     Tablet .. 650 milliGRAM(s) Oral every 6 hours PRN Temp greater or equal to 38C (100.4F), Mild Pain (1 - 3)  aluminum hydroxide/magnesium hydroxide/simethicone Suspension 30 milliLiter(s) Oral every 4 hours PRN Dyspepsia  melatonin 3 milliGRAM(s) Oral at bedtime PRN Insomnia  ondansetron Injectable 4 milliGRAM(s) IV Push every 8 hours PRN Nausea and/or Vomiting  oxyCODONE    IR 5 milliGRAM(s) Oral every 4 hours PRN Moderate Pain (4 - 6)  oxyCODONE    IR 10 milliGRAM(s) Oral every 4 hours PRN Severe Pain (7 - 10)  simethicone 80 milliGRAM(s) Chew every 8 hours PRN Heartburn      Vital Signs Last 24 Hrs  T(C): 36.6 (27 Aug 2024 05:56), Max: 37.1 (26 Aug 2024 13:47)  T(F): 97.9 (27 Aug 2024 05:56), Max: 98.7 (26 Aug 2024 13:47)  HR: 74 (27 Aug 2024 05:56) (71 - 74)  BP: 118/69 (27 Aug 2024 05:56) (118/69 - 138/79)  BP(mean): --  RR: 18 (27 Aug 2024 05:56) (18 - 19)  SpO2: 96% (27 Aug 2024 05:56) (96% - 97%)    Parameters below as of 26 Aug 2024 21:09  Patient On (Oxygen Delivery Method): room air      CAPILLARY BLOOD GLUCOSE        I&O's Summary      PHYSICAL EXAM:  GENERAL: NAD, well-developed  HEAD:  Atraumatic, Normocephalic  EYES: EOMI, conjunctiva and sclera clear  NECK: Supple, No JVD  CHEST/LUNG: Clear to auscultation bilaterally; No wheeze  HEART: Regular rate and rhythm; No murmurs, rubs, or gallops  ABDOMEN: Soft, Nontender, Nondistended; Bowel sounds present  EXTREMITIES:  2+ Peripheral Pulses, No clubbing, cyanosis, or edema  NEUROLOGY: AOx3, non-focal  SKIN: No rashes or lesions    LABS:                        10.8   6.11  )-----------( 199      ( 27 Aug 2024 07:12 )             32.2     08-26    126<L>  |  87<L>  |  19  ----------------------------<  72  4.3   |  19<L>  |  0.93    Ca    9.0      26 Aug 2024 07:32  Phos  2.8     08-26  Mg     2.1     08-26    TPro  6.5  /  Alb  2.7<L>  /  TBili  9.0<H>  /  DBili  7.1<H>  /  AST  239<H>  /  ALT  125<H>  /  AlkPhos  853<H>  08-26            MICRO:      RADIOLOGY & ADDITIONAL TESTS:           DATE OF SERVICE: 08-27-24 @ 07:54    Patient is a 73y old  Male who presents with a chief complaint of Gastric adenocarcinoma (26 Aug 2024 13:49)      SUBJECTIVE / OVERNIGHT EVENTS:  Overnight, no acute events.   Pt seen and examined at bedside.    ROS negative except as above.    MEDICATIONS  (STANDING):  aspirin  chewable 81 milliGRAM(s) Oral daily  enoxaparin Injectable 40 milliGRAM(s) SubCutaneous every 24 hours  finasteride 5 milliGRAM(s) Oral daily  levothyroxine 50 MICROGram(s) Oral daily  pantoprazole    Tablet 40 milliGRAM(s) Oral every 12 hours  polyethylene glycol 3350 17 Gram(s) Oral daily  senna 2 Tablet(s) Oral at bedtime  tamsulosin 0.4 milliGRAM(s) Oral at bedtime    MEDICATIONS  (PRN):  acetaminophen     Tablet .. 650 milliGRAM(s) Oral every 6 hours PRN Temp greater or equal to 38C (100.4F), Mild Pain (1 - 3)  aluminum hydroxide/magnesium hydroxide/simethicone Suspension 30 milliLiter(s) Oral every 4 hours PRN Dyspepsia  melatonin 3 milliGRAM(s) Oral at bedtime PRN Insomnia  ondansetron Injectable 4 milliGRAM(s) IV Push every 8 hours PRN Nausea and/or Vomiting  oxyCODONE    IR 5 milliGRAM(s) Oral every 4 hours PRN Moderate Pain (4 - 6)  oxyCODONE    IR 10 milliGRAM(s) Oral every 4 hours PRN Severe Pain (7 - 10)  simethicone 80 milliGRAM(s) Chew every 8 hours PRN Heartburn      Vital Signs Last 24 Hrs  T(C): 36.6 (27 Aug 2024 05:56), Max: 37.1 (26 Aug 2024 13:47)  T(F): 97.9 (27 Aug 2024 05:56), Max: 98.7 (26 Aug 2024 13:47)  HR: 74 (27 Aug 2024 05:56) (71 - 74)  BP: 118/69 (27 Aug 2024 05:56) (118/69 - 138/79)  BP(mean): --  RR: 18 (27 Aug 2024 05:56) (18 - 19)  SpO2: 96% (27 Aug 2024 05:56) (96% - 97%)    Parameters below as of 26 Aug 2024 21:09  Patient On (Oxygen Delivery Method): room air      CAPILLARY BLOOD GLUCOSE        I&O's Summary      PHYSICAL EXAM:  GENERAL: NAD, well-developed  HEAD:  Atraumatic, Normocephalic  EYES: EOMI, conjunctiva and sclera clear  NECK: Supple, No JVD  CHEST/LUNG: Clear to auscultation bilaterally; No wheeze  HEART: Regular rate and rhythm; No murmurs, rubs, or gallops  ABDOMEN: Soft, Nontender, Nondistended; Bowel sounds present  EXTREMITIES:  2+ Peripheral Pulses, No clubbing, cyanosis, or edema  NEUROLOGY: AOx3, non-focal  SKIN: No rashes or lesions    LABS:                        10.8   6.11  )-----------( 199      ( 27 Aug 2024 07:12 )             32.2     08-26    126<L>  |  87<L>  |  19  ----------------------------<  72  4.3   |  19<L>  |  0.93    Ca    9.0      26 Aug 2024 07:32  Phos  2.8     08-26  Mg     2.1     08-26    TPro  6.5  /  Alb  2.7<L>  /  TBili  9.0<H>  /  DBili  7.1<H>  /  AST  239<H>  /  ALT  125<H>  /  AlkPhos  853<H>  08-26            MICRO:      RADIOLOGY & ADDITIONAL TESTS:

## 2024-08-28 NOTE — PROGRESS NOTE ADULT - PROBLEM SELECTOR PLAN 1
Biopsy proven in [facility] from endoscopy on 8/12/24.   Hospital course with Episodes of coffee ground emesis on 8/19, and dark stools 8/19-8/20, associated with increasing Tbili, transaminanses, and ALP levels.  - IR bx 8/22  - [facility] palliative - pending     Plan:   - Pantoprazole 40mg PO BID  -Onc following, appreciate recs   -Palliative Care following, appreciate recs   - F/U biopsy results - expedited  - Awaiting MMR results from specimen

## 2024-08-28 NOTE — PROGRESS NOTE ADULT - SUBJECTIVE AND OBJECTIVE BOX
[facility] : Ans Serv [phone], Office [phone]  Dr. Gaming/Dr Johnson/Dr Granados  /Dr Ac floyd /Dr ALLY Holbrook/Dr Elvin Georges/Dr Esteban Guzman /Dr CONSUELO Dhillon  _______________________________________________________________________________________________    Pt seen and examined this morning   no acute issues noted overnight     VITALS:  T(F): 97.4 (08-28 @ 04:27), Max: 98.7 (08-26 @ 13:47)  HR: 88 (08-28 @ 04:27)  BP: 131/77 (08-28 @ 04:27)  ABP: --  RR: 18 (08-28 @ 04:27)  SpO2: 96% (08-28 @ 04:27)    08-27 @ 07:01  -  08-28 @ 07:00  --------------------------------------------------------  IN: 240 mL / OUT: 0 mL / NET: 240 mL      Physical Exam :-  Constitutional: ill appearing, frail  HEENT: anicteric sclera, oropharynx clear, MMM  Neck: supple.   Respiratory: Bilateral equal breath sounds , no wheezes, no crackles  Cardiovascular: S1, S2, Regular  Gastrointestinal: tender to palpation  Extremities: No peripheral edema  Neurological: awake and alert    Data:-  Allergies :   No Known Allergies    Hospital Medications:   MEDICATIONS  (STANDING):  aspirin  chewable 81 milliGRAM(s) Oral daily  enoxaparin Injectable 40 milliGRAM(s) SubCutaneous every 24 hours  finasteride 5 milliGRAM(s) Oral daily  levothyroxine 50 MICROGram(s) Oral daily  pantoprazole    Tablet 40 milliGRAM(s) Oral every 12 hours  polyethylene glycol 3350 17 Gram(s) Oral daily  senna 2 Tablet(s) Oral at bedtime  sodium chloride 2% . 1000 milliLiter(s) (35 mL/Hr) IV Continuous <Continuous>  tamsulosin 0.4 milliGRAM(s) Oral at bedtime    08-28    128<L>  |  91<L>  |  22  ----------------------------<  71  4.8   |  19<L>  |  0.99    Ca    9.3      28 Aug 2024 06:40  Phos  3.0     08-28  Mg     2.1     08-28    TPro  6.5  /  Alb  2.8<L>  /  TBili  10.6<H>  /  DBili      /  AST  304<H>  /  ALT  141<H>  /  AlkPhos  887<H>  08-28    Creatinine Trend: 0.99 <--, 0.91 <--, 0.94 <--, 0.92 <--, 0.93 <--, 0.87 <--  egfr trend : 79 <--, 88 <--, 85 <--, 87 <--, 86 <--, 90 <--, 91 <--                        11.3   6.75  )-----------( 192      ( 28 Aug 2024 06:39 )             32.5

## 2024-08-28 NOTE — PROGRESS NOTE ADULT - TIME BILLING
- Ordering, reviewing, and interpreting labs, testing, and imaging.  - Independently obtaining a review of systems and performing a physical exam  - Reviewing consultant documentation/recommendations in addition to discussing plan of care with consultants.  - Counselling and educating patient and family regarding interpretation of aforementioned items and plan of care.  - time does not include teaching

## 2024-08-28 NOTE — PROGRESS NOTE ADULT - ASSESSMENT
74 y/o M, PMH CAD w/5 stents, HTN, HLD, recent Dx of gastric adenocarcinoma, Kyrgyz speaking, presents with burning epigastric pain associated with eating, shortness of breath on exertion x 2 weeks. He was evaluated in [facility] with endoscopy for the abdominal pain, constipation and black stools on 8/12, which showed a bleeding gastric ulcer, biopsy was taken and hemostasis achieved, Biopsy positive for gastric adenocarcinoma. CT imaging revealed metastasis to liver and adjacent lymph nodes. He elected to pursue further care here in the [place], and deferred any treatment in [facility]. Awaiting liver biopsy 8/20.

## 2024-08-28 NOTE — PROGRESS NOTE ADULT - ASSESSMENT
73 year old male with PMH of CAD w/5 stents, HTN, HLD, recent Dx of gastric adenocarcinoma, German speaking, presents with burning epigastric pain associated with eating x 2 days, shortness of breath on exertion x 2 weeks. Hospital course thus farhas been complicated by coffee ground emesis and abnormal liver enzymes. Pt underwent for IR guided biopsy of liver mets 8/22. The nephrology team was consulted for hyponatremia.    Hyponatremia   likely in setting of poor solute intake and SIADH  abdominal pain stimulating ADH  urine lytes reviewed; consistent with same     plan   SNa improved to 128 this morning   s/p HTS with 2%NS at 35cc/hr for 6 hours twice yesterday  recommend starting UreNa 15g BID  also with low albumin in setting of his liver disease  --- following HTS may consider albumin infusions as well  encourage PO intake; can add protein shakes with meals   maintain free water limit  avoid hypotonic solutions   monitor serum sodium     If any questions, please feel free to contact me     Dandre Gaming  Nephrology Attending  Cell #[phone]

## 2024-08-28 NOTE — PROGRESS NOTE ADULT - ATTENDING COMMENTS
74 y/o M, PMH CAD w/5 stents, HTN, HLD, recent Dx of gastric adenocarcinoma, Czech speaking, presents with burning epigastric pain associated with eating, shortness of breath on exertion x 2 weeks. He was evaluated in [facility] with endoscopy for the abdominal pain, constipation and black stools on 8/12, which showed a bleeding gastric ulcer, biopsy was taken and hemostasis achieved, Biopsy positive for gastric adenocarcinoma. CT imaging revealed metastasis to liver and adjacent lymph nodes. He elected to pursue further care here in the [place], and deferred any treatment in [facility]. s/p liver biopsy 8/20.     Patient is not a candidate for  chemotherapy given elevated bilirubin. Pending liver biopsy, he may be a candidate for immunotherapy.     hyponatremia, likely both SIADH and hypovolemic - nephrology recs appreciated - improved on 2% NaCl and started urena 15 BID 8/28    [place] [facility] [place]/palliative following     Repeat RUQUS given worsening bilirubin and decreased appetite - no cholecystitis noted     PT consulted given debility.

## 2024-08-28 NOTE — PROGRESS NOTE ADULT - PROBLEM SELECTOR PLAN 6
2/2 thrombophlebitis. Small 3cm circular area of redness with associated induration and TTP over dorsum of L hand, no streaking, no spreading redness, no fluctuance, Sensorimotor function of L hand intact. Reports frequent needle sticks in L hand during recent hospitalization in [facility].  -L hand U/S of soft tissue neg for abscess  -warm compresses prn

## 2024-08-28 NOTE — PROGRESS NOTE ADULT - PROBLEM SELECTOR PLAN 2
Hyponatremic 127/128 since admit. 8/27 124. 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      Plan:  - Fluid restricted halal diet   - Hypertonic 2% NaCl x 6 hours, repeat BMP following   - Trend BMP q8 per nephro till na > 130  - Asymptomatic, persistent poor pO intake Hyponatremic 127/128 since admit. 8/27 124. 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     Improved from 124 to 128 AM     Plan:  - Fluid restricted halal diet   - Hypertonic 2% NaCl x 6 hours twice   - Trend BMP q8 per nephro till na > 130  - Start urea tabs, per nephro   - Asymptomatic, persistent poor pO intake  - Neprho following, appreciate recs

## 2024-08-28 NOTE — PROGRESS NOTE ADULT - SUBJECTIVE AND OBJECTIVE BOX
DATE OF SERVICE: 08-28-24 @ 07:23    Patient is a 73y old  Male who presents with a chief complaint of Gastric adenocarcinoma (27 Aug 2024 09:37)      SUBJECTIVE / OVERNIGHT EVENTS:  Overnight,  Pt seen and examined at bedside.    ROS negative except as above.    MEDICATIONS  (STANDING):  aspirin  chewable 81 milliGRAM(s) Oral daily  enoxaparin Injectable 40 milliGRAM(s) SubCutaneous every 24 hours  finasteride 5 milliGRAM(s) Oral daily  levothyroxine 50 MICROGram(s) Oral daily  pantoprazole    Tablet 40 milliGRAM(s) Oral every 12 hours  polyethylene glycol 3350 17 Gram(s) Oral daily  senna 2 Tablet(s) Oral at bedtime  sodium chloride 2% . 1000 milliLiter(s) (35 mL/Hr) IV Continuous <Continuous>  tamsulosin 0.4 milliGRAM(s) Oral at bedtime    MEDICATIONS  (PRN):  acetaminophen     Tablet .. 650 milliGRAM(s) Oral every 6 hours PRN Temp greater or equal to 38C (100.4F), Mild Pain (1 - 3)  aluminum hydroxide/magnesium hydroxide/simethicone Suspension 30 milliLiter(s) Oral every 4 hours PRN Dyspepsia  melatonin 3 milliGRAM(s) Oral at bedtime PRN Insomnia  ondansetron Injectable 4 milliGRAM(s) IV Push every 8 hours PRN Nausea and/or Vomiting  oxyCODONE    IR 5 milliGRAM(s) Oral every 4 hours PRN Moderate Pain (4 - 6)  oxyCODONE    IR 10 milliGRAM(s) Oral every 4 hours PRN Severe Pain (7 - 10)  simethicone 80 milliGRAM(s) Chew every 8 hours PRN Heartburn      Vital Signs Last 24 Hrs  T(C): 36.3 (28 Aug 2024 04:27), Max: 36.8 (27 Aug 2024 14:05)  T(F): 97.4 (28 Aug 2024 04:27), Max: 98.3 (27 Aug 2024 14:05)  HR: 88 (28 Aug 2024 04:27) (74 - 88)  BP: 131/77 (28 Aug 2024 04:27) (111/69 - 133/81)  BP(mean): --  RR: 18 (28 Aug 2024 04:27) (18 - 18)  SpO2: 96% (28 Aug 2024 04:27) (96% - 97%)    Parameters below as of 28 Aug 2024 04:27  Patient On (Oxygen Delivery Method): room air      CAPILLARY BLOOD GLUCOSE        I&O's Summary    27 Aug 2024 07:01  -  28 Aug 2024 07:00  --------------------------------------------------------  IN: 240 mL / OUT: 0 mL / NET: 240 mL        PHYSICAL EXAM:  GENERAL: NAD, well-developed  HEAD:  Atraumatic, Normocephalic  EYES: EOMI, conjunctiva and sclera clear  NECK: Supple, No JVD  CHEST/LUNG: Clear to auscultation bilaterally; No wheeze  HEART: Regular rate and rhythm; No murmurs, rubs, or gallops  ABDOMEN: Soft, Nontender, Nondistended; Bowel sounds present  EXTREMITIES:  2+ Peripheral Pulses, No clubbing, cyanosis, or edema  NEUROLOGY: AOx3, non-focal  SKIN: No rashes or lesions    LABS:                        11.3   6.75  )-----------( 192      ( 28 Aug 2024 06:39 )             32.5     08-28    128<L>  |  91<L>  |  22  ----------------------------<  71  4.8   |  19<L>  |  0.99    Ca    9.3      28 Aug 2024 06:40  Phos  3.0     08-28  Mg     2.1     08-28    TPro  6.5  /  Alb  2.8<L>  /  TBili  10.6<H>  /  DBili  x   /  AST  304<H>  /  ALT  141<H>  /  AlkPhos  887<H>  08-28    PT/INR - ( 28 Aug 2024 06:41 )   PT: 12.1 sec;   INR: 1.10 ratio         PTT - ( 28 Aug 2024 06:41 )  PTT:26.1 sec        MICRO:      RADIOLOGY & ADDITIONAL TESTS:           DATE OF SERVICE: 08-28-24 @ 07:23    Patient is a 73y old  Male who presents with a chief complaint of Gastric adenocarcinoma (27 Aug 2024 09:37)      SUBJECTIVE / OVERNIGHT EVENTS:  Overnight, no acute events.   Pt seen and examined at bedside. Reports some gas pain this AM. Reg BMs.     ROS negative except as above.    MEDICATIONS  (STANDING):  aspirin  chewable 81 milliGRAM(s) Oral daily  enoxaparin Injectable 40 milliGRAM(s) SubCutaneous every 24 hours  finasteride 5 milliGRAM(s) Oral daily  levothyroxine 50 MICROGram(s) Oral daily  pantoprazole    Tablet 40 milliGRAM(s) Oral every 12 hours  polyethylene glycol 3350 17 Gram(s) Oral daily  senna 2 Tablet(s) Oral at bedtime  sodium chloride 2% . 1000 milliLiter(s) (35 mL/Hr) IV Continuous <Continuous>  tamsulosin 0.4 milliGRAM(s) Oral at bedtime    MEDICATIONS  (PRN):  acetaminophen     Tablet .. 650 milliGRAM(s) Oral every 6 hours PRN Temp greater or equal to 38C (100.4F), Mild Pain (1 - 3)  aluminum hydroxide/magnesium hydroxide/simethicone Suspension 30 milliLiter(s) Oral every 4 hours PRN Dyspepsia  melatonin 3 milliGRAM(s) Oral at bedtime PRN Insomnia  ondansetron Injectable 4 milliGRAM(s) IV Push every 8 hours PRN Nausea and/or Vomiting  oxyCODONE    IR 5 milliGRAM(s) Oral every 4 hours PRN Moderate Pain (4 - 6)  oxyCODONE    IR 10 milliGRAM(s) Oral every 4 hours PRN Severe Pain (7 - 10)  simethicone 80 milliGRAM(s) Chew every 8 hours PRN Heartburn      Vital Signs Last 24 Hrs  T(C): 36.3 (28 Aug 2024 04:27), Max: 36.8 (27 Aug 2024 14:05)  T(F): 97.4 (28 Aug 2024 04:27), Max: 98.3 (27 Aug 2024 14:05)  HR: 88 (28 Aug 2024 04:27) (74 - 88)  BP: 131/77 (28 Aug 2024 04:27) (111/69 - 133/81)  BP(mean): --  RR: 18 (28 Aug 2024 04:27) (18 - 18)  SpO2: 96% (28 Aug 2024 04:27) (96% - 97%)    Parameters below as of 28 Aug 2024 04:27  Patient On (Oxygen Delivery Method): room air      CAPILLARY BLOOD GLUCOSE        I&O's Summary    27 Aug 2024 07:01  -  28 Aug 2024 07:00  --------------------------------------------------------  IN: 240 mL / OUT: 0 mL / NET: 240 mL        PHYSICAL EXAM:  GENERAL: NAD, well-developed  HEAD:  Atraumatic, Normocephalic  EYES: EOMI, conjunctiva and sclera clear  NECK: Supple, No JVD  CHEST/LUNG: Clear to auscultation bilaterally; No wheeze  HEART: Regular rate and rhythm; No murmurs, rubs, or gallops  ABDOMEN: slightly distended, some tenderness to palpation; Bowel sounds present  EXTREMITIES:  2+ Peripheral Pulses, No clubbing, cyanosis, or edema  NEUROLOGY: AOx3, non-focal  SKIN: No rashes or lesions    LABS:                        11.3   6.75  )-----------( 192      ( 28 Aug 2024 06:39 )             32.5     08-28    128<L>  |  91<L>  |  22  ----------------------------<  71  4.8   |  19<L>  |  0.99    Ca    9.3      28 Aug 2024 06:40  Phos  3.0     08-28  Mg     2.1     08-28    TPro  6.5  /  Alb  2.8<L>  /  TBili  10.6<H>  /  DBili  x   /  AST  304<H>  /  ALT  141<H>  /  AlkPhos  887<H>  08-28    PT/INR - ( 28 Aug 2024 06:41 )   PT: 12.1 sec;   INR: 1.10 ratio         PTT - ( 28 Aug 2024 06:41 )  PTT:26.1 sec        MICRO:      RADIOLOGY & ADDITIONAL TESTS:

## 2024-08-28 NOTE — PROGRESS NOTE ADULT - PROBLEM SELECTOR PLAN 3
Bilirubin Total: 9.8 <- 1.5admission  Alkaline Phosphatase: 853 <- 828 <- 785  AST: 239 <- 158  ALT: 125 <- 91  Suspected to be due to metastatic infiltration from multiple mets  Overall trend is upwards.    Plan:  - RUQ US to evaluate bili   - Trend CMP  - Pending liver bx results Bilirubin Total: 9.8 <- 1.5admission  Alkaline Phosphatase: 853 <- 828 <- 785  AST: 239 <- 158  ALT: 125 <- 91  Suspected to be due to metastatic infiltration from multiple mets  Overall trend is upwards. RUQ showing innumerable liver mets/neg for biliary dilation     Plan:  - Trend CMP  - Pending liver bx results

## 2024-08-29 NOTE — PROGRESS NOTE ADULT - SUBJECTIVE AND OBJECTIVE BOX
SUBJECTIVE AND OBJECTIVE: pt seen and examined at bedside, alert and oriented, able to participate in exam   Indication for Geriatrics and Palliative Care Services/INTERVAL HPI: goc     OVERNIGHT EVENTS: pt with episode of hematemesis o/n, addressed by primary team   DNR on chart:  Allergies    No Known Allergies    Intolerances    MEDICATIONS  (STANDING):  aspirin  chewable 81 milliGRAM(s) Oral daily  enoxaparin Injectable 40 milliGRAM(s) SubCutaneous every 24 hours  finasteride 5 milliGRAM(s) Oral daily  levothyroxine 50 MICROGram(s) Oral daily  lidocaine   4% Patch 1 Patch Transdermal every 24 hours  pantoprazole    Tablet 40 milliGRAM(s) Oral every 12 hours  polyethylene glycol 3350 17 Gram(s) Oral daily  senna 2 Tablet(s) Oral at bedtime  sodium chloride 2% . 1000 milliLiter(s) (50 mL/Hr) IV Continuous <Continuous>  tamsulosin 0.4 milliGRAM(s) Oral at bedtime  urea Oral Powder 15 Gram(s) Oral every 12 hours    MEDICATIONS  (PRN):  acetaminophen     Tablet .. 650 milliGRAM(s) Oral every 6 hours PRN Temp greater or equal to 38C (100.4F), Mild Pain (1 - 3)  aluminum hydroxide/magnesium hydroxide/simethicone Suspension 30 milliLiter(s) Oral every 4 hours PRN Dyspepsia  melatonin 3 milliGRAM(s) Oral at bedtime PRN Insomnia  ondansetron Injectable 4 milliGRAM(s) IV Push every 8 hours PRN Nausea and/or Vomiting  oxyCODONE    IR 10 milliGRAM(s) Oral every 4 hours PRN Severe Pain (7 - 10)  oxyCODONE    IR 5 milliGRAM(s) Oral every 4 hours PRN Moderate Pain (4 - 6)  simethicone 80 milliGRAM(s) Chew every 8 hours PRN Heartburn      ITEMS UNCHECKED ARE NOT PRESENT    PRESENT SYMPTOMS: [ ]Unable to self-report - see [ ] CPOT [ ] PAINADS [ ] RDOS  Source if other than patient:  [ ]Family   [ ]Team     Pain:  [ ]yes [x ]no- when pain is present typically abdominal gas pain relieved with simethicone   QOL impact -   Location -                    Aggravating factors -  Quality -  Radiation -  Timing-  Severity (0-10 scale):  Minimal acceptable level (0-10 scale):     CPOT:    https://www.sccm.org/getattachment/ibk24y63-0r7w-8w5q-5n9o-2758b0259d1g/Critical-Care-Pain-Observation-Tool-(CPOT)    PAINAD Score: See PAINAD tool and score below       Dyspnea:                           [ ]Mild [ ]Moderate [ ]Severe None     RDOS: See RDOS tool and score below   0 to 2  minimal or no respiratory distress   3  mild distress  4 to 6 moderate distress  >7 severe distress      Anxiety:                             [ ]Mild [ ]Moderate [ ]Severe  Fatigue:                             [ ]Mild [ ]Moderate [ ]Severe  Nausea:                             [ ]Mild [ ]Moderate [ ]Severe  Loss of appetite:              [ ]Mild [ ]Moderate [ ]Severe  Constipation:                    [ ]Mild [ ]Moderate [ ]Severe    PCSSQ[Palliative Care Spiritual Screening Question]   Severity (0-10):  Score of 4 or > indicate consideration of Chaplaincy referral.  Chaplaincy Referral: [ ] yes [ ] refused [ ] following [ ] Deferred     Caregiver [place]? : [ ] yes [ ] no [ ] Deferred [ ] Declined             Social work referral [ ] Patient & Family Centered Care Referral [ ]     Anticipatory Grief present?:  [ ] yes [ ] no  [ ] Deferred                  Social work referral [ ] Chaplaincy Referral [ ]    		  Other Symptoms:  [x ]All other review of systems negative     Palliative Performance Status Version 2:   See PPSv2 tool and score below         PHYSICAL EXAM:  Vital Signs Last 24 Hrs  T(C): 36.6 (29 Aug 2024 13:08), Max: 36.6 (28 Aug 2024 21:26)  T(F): 97.9 (29 Aug 2024 13:08), Max: 97.9 (29 Aug 2024 13:08)  HR: 87 (29 Aug 2024 13:08) (72 - 87)  BP: 109/64 (29 Aug 2024 13:08) (109/64 - 123/70)  BP(mean): --  RR: 18 (29 Aug 2024 13:08) (16 - 18)  SpO2: 95% (29 Aug 2024 13:08) (95% - 96%)    Parameters below as of 29 Aug 2024 13:08  Patient On (Oxygen Delivery Method): room air     I&O's Summary     GENERAL: [ ]Cachexia    [x ]Alert  [ x]Oriented x  3 [ ]Lethargic  [ ]Unarousable  [ ]Verbal  [ ]Non-Verbal  Behavioral:   [ ]Anxiety  [ ]Delirium [ ]Agitation [ ]Other  HEENT:  [ ]Normal   [ ]Dry mouth   [ ]ET Tube/Trach  [ ]Oral lesions  PULMONARY:   [ ]Clear [ ]Tachypnea  [ ]Audible excessive secretions   [ ]Rhonchi        [ ]Right [ ]Left [ ]Bilateral  [ ]Crackles        [ ]Right [ ]Left [ ]Bilateral  [ ]Wheezing     [ ]Right [ ]Left [ ]Bilateral  [ ]Diminished BS [ ] Right [ ]Left [ ]Bilateral  CARDIOVASCULAR:    [x ]Regular [ ]Irregular [ ]Tachy  [ ]Galdino [ ]Murmur [ ]Other  GASTROINTESTINAL:  [ ]Soft  [ ]Distended   [x ]+BS  [ ]Non tender [ x]Tender  [ ]Other [ ]PEG [ ]OGT/ NGT   Last BM:   GENITOURINARY:  [ x]Normal [ ]Incontinent   [ ]Oliguria/Anuria   [ ]Morales  MUSCULOSKELETAL:   [ ]Normal   [x ]Weakness  [ ]Bed/Wheelchair bound [ ]Edema  NEUROLOGIC:   [x ]No focal deficits  [ ] Cognitive impairment  [ ] Dysphagia [ ]Dysarthria [ ] Paresis [ ]Other   SKIN:   [ ]Normal  [ ]Rash  [ ]Other  [ ]Pressure ulcer(s) [ ]y [ ]n present on admission    CRITICAL CARE:  [ ]Shock Present  [ ]Septic [ ]Cardiogenic [ ]Neurologic [ ]Hypovolemic  [ ]Vasopressors [ ]Inotropes  [ ]Respiratory failure present [ ]Mechanical Ventilation [ ]Non-invasive ventilatory support [ ]High-Flow   [ ]Acute  [ ]Chronic [ ]Hypoxic  [ ]Hypercarbic [ ]Other  [ ]Other organ failure     LABS:                        10.3   9.19  )-----------( 214      ( 29 Aug 2024 15:06 )             31.4   08-29    123<L>  |  90<L>  |  38<H>  ----------------------------<  79  5.4<H>   |  19<L>  |  1.05    Ca    9.8      29 Aug 2024 06:46  Phos  3.5     08-29  Mg     2.2     08-29    TPro  6.4  /  Alb  2.8<L>  /  TBili  10.8<H>  /  DBili  x   /  AST  417<H>  /  ALT  172<H>  /  AlkPhos  909<H>  08-29  PT/INR - ( 28 Aug 2024 06:41 )   PT: 12.1 sec;   INR: 1.10 ratio         PTT - ( 28 Aug 2024 06:41 )  PTT:26.1 sec    Urinalysis Basic - ( 29 Aug 2024 06:46 )    Color: x / Appearance: x / SG: x / pH: x  Gluc: 79 mg/dL / Ketone: x  / Bili: x / Urobili: x   Blood: x / Protein: x / Nitrite: x   Leuk Esterase: x / RBC: x / WBC x   Sq Epi: x / Non Sq Epi: x / Bacteria: x      RADIOLOGY & ADDITIONAL STUDIES:  < from: US Abdomen Upper Quadrant Right (08.27.24 @ 16:20) >    ACC: [id number] EXAM:  US ABDOMEN RT UPR QUADRANT   ORDERED BY:  CALLIE LEWIS     PROCEDURE DATE:  08/27/2024          INTERPRETATION:  CLINICAL INFORMATION: 73 year old male with history of   gastric cancer with liver metastasis. Increasing bilirubin. Evaluate for   biliary pathology.    COMPARISON: US abdomen limited for ascites 8/25/2024; US abdomen right   upper quadrant 8/16/2024; CT chest abdomen pelvis 8/16/2024    TECHNIQUE: Sonography of the right upper quadrant.    FINDINGS:  Liver:Hepatomegaly measuring 20.0 cm with diffuse innumerable   hyperechoic lesions consistent with known metastatic disease.  Bile ducts: Normal caliber. Common bile duct measures 4 millimeters.  Gallbladder: Contracted. Negative sonographic Wells sign, however, pain   medication was administered prior to examination. No pericholecystic   fluid.  Pancreas: Visualized portions are within normal limits.  Right kidney: 10.5 cm. No hydronephrosis.  Ascites: None.  IVC: Visualized portions are within normal limits.    IMPRESSION:    Hepatomegaly with innumerable hyperechoic lesions consistent with known   metastatic disease.    No significant intrahepatic or extrahepatic biliary dilatation is   identified on this exam.    Contracted gallbladder.        --- End of Report ---          TERRELL STEVENS MD; Resident Radiologist  This document has been electronically signed.  YIN ROSAS MD; Attending Radiologist  This document has been electronically signed. Aug 27 2024  5:58PM    < end of copied text >    Protein Calorie Malnutrition Present: [ ]mild [ ]moderate [ ]severe [ ]underweight [ ]morbid obesity  https://www.andeal.org/vault/2440/web/files/ONC/Table_Clinical%20Characteristics%20to%20Document%20Malnutrition-White%20JV%20et%20al%202012.pdf    Height (cm): 165.1 (08-22-24 @ 08:55)  Weight (kg): 68 (08-22-24 @ 08:55)  BMI (kg/m2): 24.9 (08-22-24 @ 08:55)    [ ]PPSV2 < or = 30%  [ ]significant weight loss [ ]poor nutritional intake [ ]anasarca[ ]Artificial Nutrition    Other REFERRALS:  [ ]Hospice  [ ]Child Life  [ ]Social Work  [ ]Case management [ ]Holistic Therapy     Goals of Care Document:

## 2024-08-29 NOTE — PROGRESS NOTE ADULT - ASSESSMENT
73 year old male with PMH of CAD w/5 stents, HTN, HLD, recent Dx of gastric adenocarcinoma, Croatian speaking, presents with burning epigastric pain associated with eating x 2 days, shortness of breath on exertion x 2 weeks. Hospital course thus farhas been complicated by coffee ground emesis and abnormal liver enzymes. Pt underwent for IR guided biopsy of liver mets 8/22. The nephrology team was consulted for hyponatremia.    Hyponatremia   likely in setting of poor solute intake and SIADH  abdominal pain stimulating ADH  urine lytes reviewed; consistent with same     plan   SNa worsened to 123 this morning  will give HTS with 2% at 50cc/hour for 12 hours to quickly raise   distal deliverance of sodium can also help with potassium excretion   acute drop likely from increased water intake and nausea stimulating ADH  continue with UreNa 15g BID  encourage PO intake; protein shakes with meals   maintain free water limit  avoid hypotonic solutions   monitor serum sodium     If any questions, please feel free to contact me     Dandre Gaming  Nephrology Attending  Cell #[phone]

## 2024-08-29 NOTE — PROGRESS NOTE ADULT - ASSESSMENT
74 y/o M, PMH CAD w/5 stents, HTN, HLD, recent Dx of gastric adenocarcinoma, Mongolian speaking, presents with burning epigastric pain associated with eating, shortness of breath on exertion x 2 weeks. He was evaluated in [facility] with endoscopy for the abdominal pain, constipation and black stools on 8/12, which showed a bleeding gastric ulcer, biopsy was taken and hemostasis achieved, Biopsy positive for gastric adenocarcinoma. CT imaging revealed metastasis to liver and adjacent lymph nodes. He elected to pursue further care here in the [place], and deferred any treatment in [facility]. Awaiting liver biopsy 8/20. (Taken from internal medicine note). Palliative consulted for GOC.

## 2024-08-29 NOTE — PROGRESS NOTE ADULT - SUBJECTIVE AND OBJECTIVE BOX
DATE OF SERVICE: 08-29-24 @ 08:13    Patient is a 73y old  Male who presents with a chief complaint of Gastric adenocarcinoma (28 Aug 2024 08:44)      SUBJECTIVE / OVERNIGHT EVENTS:  Overnight,  Pt seen and examined at bedside.    ROS negative except as above.    MEDICATIONS  (STANDING):  aspirin  chewable 81 milliGRAM(s) Oral daily  enoxaparin Injectable 40 milliGRAM(s) SubCutaneous every 24 hours  finasteride 5 milliGRAM(s) Oral daily  levothyroxine 50 MICROGram(s) Oral daily  lidocaine   4% Patch 1 Patch Transdermal every 24 hours  pantoprazole    Tablet 40 milliGRAM(s) Oral every 12 hours  polyethylene glycol 3350 17 Gram(s) Oral daily  senna 2 Tablet(s) Oral at bedtime  sodium chloride 2% . 1000 milliLiter(s) (35 mL/Hr) IV Continuous <Continuous>  tamsulosin 0.4 milliGRAM(s) Oral at bedtime  urea Oral Powder 15 Gram(s) Oral every 12 hours    MEDICATIONS  (PRN):  acetaminophen     Tablet .. 650 milliGRAM(s) Oral every 6 hours PRN Temp greater or equal to 38C (100.4F), Mild Pain (1 - 3)  aluminum hydroxide/magnesium hydroxide/simethicone Suspension 30 milliLiter(s) Oral every 4 hours PRN Dyspepsia  melatonin 3 milliGRAM(s) Oral at bedtime PRN Insomnia  ondansetron Injectable 4 milliGRAM(s) IV Push every 8 hours PRN Nausea and/or Vomiting  oxyCODONE    IR 10 milliGRAM(s) Oral every 4 hours PRN Severe Pain (7 - 10)  oxyCODONE    IR 5 milliGRAM(s) Oral every 4 hours PRN Moderate Pain (4 - 6)  simethicone 80 milliGRAM(s) Chew every 8 hours PRN Heartburn      Vital Signs Last 24 Hrs  T(C): 36.4 (29 Aug 2024 04:00), Max: 36.6 (28 Aug 2024 21:26)  T(F): 97.5 (29 Aug 2024 04:00), Max: 97.8 (28 Aug 2024 21:26)  HR: 72 (29 Aug 2024 04:00) (72 - 80)  BP: 118/71 (29 Aug 2024 04:00) (118/71 - 146/76)  BP(mean): --  RR: 18 (29 Aug 2024 04:00) (16 - 18)  SpO2: 96% (29 Aug 2024 04:00) (95% - 100%)    Parameters below as of 29 Aug 2024 04:00  Patient On (Oxygen Delivery Method): room air      CAPILLARY BLOOD GLUCOSE        I&O's Summary      PHYSICAL EXAM:  GENERAL: NAD, well-developed  HEAD:  Atraumatic, Normocephalic  EYES: EOMI, conjunctiva and sclera clear  NECK: Supple, No JVD  CHEST/LUNG: Clear to auscultation bilaterally; No wheeze  HEART: Regular rate and rhythm; No murmurs, rubs, or gallops  ABDOMEN: Soft, Nontender, Nondistended; Bowel sounds present  EXTREMITIES:  2+ Peripheral Pulses, No clubbing, cyanosis, or edema  NEUROLOGY: AOx3, non-focal  SKIN: No rashes or lesions    LABS:                        10.7   7.18  )-----------( 207      ( 29 Aug 2024 06:46 )             31.5     08-29    123<L>  |  90<L>  |  38<H>  ----------------------------<  79  5.4<H>   |  19<L>  |  1.05    Ca    9.8      29 Aug 2024 06:46  Phos  3.5     08-29  Mg     2.2     08-29    TPro  6.4  /  Alb  2.8<L>  /  TBili  10.8<H>  /  DBili  x   /  AST  417<H>  /  ALT  172<H>  /  AlkPhos  909<H>  08-29    PT/INR - ( 28 Aug 2024 06:41 )   PT: 12.1 sec;   INR: 1.10 ratio         PTT - ( 28 Aug 2024 06:41 )  PTT:26.1 sec        MICRO:      RADIOLOGY & ADDITIONAL TESTS:           DATE OF SERVICE: 08-29-24 @ 08:13    Patient is a 73y old  Male who presents with a chief complaint of Gastric adenocarcinoma (28 Aug 2024 08:44)      SUBJECTIVE / OVERNIGHT EVENTS:  Overnight, bloody emesis + abdominal pain. Received zofran   Pt seen and examined at bedside.    ROS negative except as above.    MEDICATIONS  (STANDING):  aspirin  chewable 81 milliGRAM(s) Oral daily  enoxaparin Injectable 40 milliGRAM(s) SubCutaneous every 24 hours  finasteride 5 milliGRAM(s) Oral daily  levothyroxine 50 MICROGram(s) Oral daily  lidocaine   4% Patch 1 Patch Transdermal every 24 hours  pantoprazole    Tablet 40 milliGRAM(s) Oral every 12 hours  polyethylene glycol 3350 17 Gram(s) Oral daily  senna 2 Tablet(s) Oral at bedtime  sodium chloride 2% . 1000 milliLiter(s) (35 mL/Hr) IV Continuous <Continuous>  tamsulosin 0.4 milliGRAM(s) Oral at bedtime  urea Oral Powder 15 Gram(s) Oral every 12 hours    MEDICATIONS  (PRN):  acetaminophen     Tablet .. 650 milliGRAM(s) Oral every 6 hours PRN Temp greater or equal to 38C (100.4F), Mild Pain (1 - 3)  aluminum hydroxide/magnesium hydroxide/simethicone Suspension 30 milliLiter(s) Oral every 4 hours PRN Dyspepsia  melatonin 3 milliGRAM(s) Oral at bedtime PRN Insomnia  ondansetron Injectable 4 milliGRAM(s) IV Push every 8 hours PRN Nausea and/or Vomiting  oxyCODONE    IR 10 milliGRAM(s) Oral every 4 hours PRN Severe Pain (7 - 10)  oxyCODONE    IR 5 milliGRAM(s) Oral every 4 hours PRN Moderate Pain (4 - 6)  simethicone 80 milliGRAM(s) Chew every 8 hours PRN Heartburn      Vital Signs Last 24 Hrs  T(C): 36.4 (29 Aug 2024 04:00), Max: 36.6 (28 Aug 2024 21:26)  T(F): 97.5 (29 Aug 2024 04:00), Max: 97.8 (28 Aug 2024 21:26)  HR: 72 (29 Aug 2024 04:00) (72 - 80)  BP: 118/71 (29 Aug 2024 04:00) (118/71 - 146/76)  BP(mean): --  RR: 18 (29 Aug 2024 04:00) (16 - 18)  SpO2: 96% (29 Aug 2024 04:00) (95% - 100%)    Parameters below as of 29 Aug 2024 04:00  Patient On (Oxygen Delivery Method): room air      CAPILLARY BLOOD GLUCOSE        I&O's Summary      PHYSICAL EXAM:  GENERAL: NAD, well-developed  HEAD:  Atraumatic, Normocephalic  EYES: EOMI, conjunctiva and sclera clear  NECK: Supple, No JVD  CHEST/LUNG: Clear to auscultation bilaterally; No wheeze  HEART: Regular rate and rhythm; No murmurs, rubs, or gallops  ABDOMEN: Soft, Nontender, Nondistended; Bowel sounds present  EXTREMITIES:  2+ Peripheral Pulses, No clubbing, cyanosis, or edema  NEUROLOGY: AOx3, non-focal  SKIN: No rashes or lesions    LABS:                        10.7   7.18  )-----------( 207      ( 29 Aug 2024 06:46 )             31.5     08-29    123<L>  |  90<L>  |  38<H>  ----------------------------<  79  5.4<H>   |  19<L>  |  1.05    Ca    9.8      29 Aug 2024 06:46  Phos  3.5     08-29  Mg     2.2     08-29    TPro  6.4  /  Alb  2.8<L>  /  TBili  10.8<H>  /  DBili  x   /  AST  417<H>  /  ALT  172<H>  /  AlkPhos  909<H>  08-29    PT/INR - ( 28 Aug 2024 06:41 )   PT: 12.1 sec;   INR: 1.10 ratio         PTT - ( 28 Aug 2024 06:41 )  PTT:26.1 sec        MICRO:      RADIOLOGY & ADDITIONAL TESTS:

## 2024-08-29 NOTE — PROGRESS NOTE ADULT - PROBLEM SELECTOR PLAN 2
Hyponatremic 127/128 since admit. 8/27 124. 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     Improved from 124 to 128 AM     Plan:  - Fluid restricted halal diet   - Hypertonic 2% NaCl x 6 hours twice   - Trend BMP q8 per nephro till na > 130  - Start urea tabs, per nephro   - Asymptomatic, persistent poor pO intake  - Neprho following, appreciate recs Hyponatremic 127/128 since admit. 8/27 124. 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     Na 123 from 128     Plan:  - Fluid restricted halal diet   - Hypertonic 2% NaCl x 12 hours   - Trend BMP q8 per nephro till na > 130  - Start urea tabs, per nephro   - Asymptomatic, persistent poor pO intake  - Neprho following, appreciate recs

## 2024-08-29 NOTE — PROGRESS NOTE ADULT - PROBLEM SELECTOR PLAN 5
will continue to follow for goc  case discussed with primary and oncology team    Can be reached by TEAMS [id number] 9-5 Clarisa Mao Any other time please page [phone] if needed

## 2024-08-29 NOTE — PROGRESS NOTE ADULT - PROBLEM SELECTOR PLAN 6
2/2 thrombophlebitis. Small 3cm circular area of redness with associated induration and TTP over dorsum of L hand, no streaking, no spreading redness, no fluctuance, Sensorimotor function of L hand intact. Reports frequent needle sticks in L hand during recent hospitalization in [facility].  -L hand U/S of soft tissue neg for abscess  -warm compresses prn

## 2024-08-29 NOTE — PROGRESS NOTE ADULT - PROBLEM SELECTOR PLAN 3
Bilirubin Total: 9.8 <- 1.5admission  Alkaline Phosphatase: 853 <- 828 <- 785  AST: 239 <- 158  ALT: 125 <- 91  Suspected to be due to metastatic infiltration from multiple mets  Overall trend is upwards. RUQ showing innumerable liver mets/neg for biliary dilation     Plan:  - Trend CMP  - Pending liver bx results

## 2024-08-29 NOTE — PROGRESS NOTE ADULT - PROBLEM SELECTOR PLAN 1
appreciate heme/onc input  biopsy completed   pain well controlled on exam, 0 prns  pain education provided to pt and family, pt identifying pain as not severe enough to require pain medication, if concerned pt not receiving needed pain medication can consider initiating q8 ATC with the option to refuse.

## 2024-08-29 NOTE — PROGRESS NOTE ADULT - SUBJECTIVE AND OBJECTIVE BOX
[facility] : Ans Serv [phone], Office [phone]  Dr. Gaming/Dr Johnson/Dr Granados  /Dr Ac floyd /Dr ALLY Holbrook/Dr Elvin Georges/Dr Esteban Guzman /Dr CORDERO Njpatelu  _______________________________________________________________________________________________    Pt seen and examined this morning   overnight and this morning with vomiting     VITALS:  T(F): 97.5 (08-29 @ 04:00), Max: 98.3 (08-27 @ 14:05)  HR: 72 (08-29 @ 04:00)  BP: 118/71 (08-29 @ 04:00)  ABP: --  RR: 18 (08-29 @ 04:00)  SpO2: 96% (08-29 @ 04:00)    Physical Exam :-  Constitutional: ill appearing, frail  HEENT: anicteric sclera, oropharynx clear, MMM  Neck: supple.   Respiratory: Bilateral equal breath sounds , no wheezes, no crackles  Cardiovascular: S1, S2, Regular  Gastrointestinal: tender to palpation  Extremities: No peripheral edema  Neurological: awake and alert    Data:-  Allergies :   No Known Allergies    Hospital Medications:   MEDICATIONS  (STANDING):  aspirin  chewable 81 milliGRAM(s) Oral daily  enoxaparin Injectable 40 milliGRAM(s) SubCutaneous every 24 hours  finasteride 5 milliGRAM(s) Oral daily  levothyroxine 50 MICROGram(s) Oral daily  lidocaine   4% Patch 1 Patch Transdermal every 24 hours  pantoprazole    Tablet 40 milliGRAM(s) Oral every 12 hours  polyethylene glycol 3350 17 Gram(s) Oral daily  senna 2 Tablet(s) Oral at bedtime  sodium chloride 2% . 1000 milliLiter(s) (35 mL/Hr) IV Continuous <Continuous>  sodium chloride 2% . 1000 milliLiter(s) (50 mL/Hr) IV Continuous <Continuous>  tamsulosin 0.4 milliGRAM(s) Oral at bedtime  urea Oral Powder 15 Gram(s) Oral every 12 hours    08-29    123<L>  |  90<L>  |  38<H>  ----------------------------<  79  5.4<H>   |  19<L>  |  1.05    Ca    9.8      29 Aug 2024 06:46  Phos  3.5     08-29  Mg     2.2     08-29    TPro  6.4  /  Alb  2.8<L>  /  TBili  10.8<H>  /  DBili      /  AST  417<H>  /  ALT  172<H>  /  AlkPhos  909<H>  08-29    Creatinine Trend: 1.05 <--, 0.99 <--, 0.91 <--, 0.94 <--, 0.92 <--, 0.93 <--  egfr trend : 74 <--, 79 <--, 88 <--, 85 <--, 87 <--, 86 <--, 90 <--                        10.7   7.18  )-----------( 207      ( 29 Aug 2024 06:46 )             31.5

## 2024-08-29 NOTE — PROGRESS NOTE ADULT - PROBLEM SELECTOR PLAN 1
Biopsy proven in [facility] from endoscopy on 8/12/24.   Hospital course with Episodes of coffee ground emesis on 8/19, and dark stools 8/19-8/20, associated with increasing Tbili, transaminanses, and ALP levels.  - IR bx 8/22  - [facility] palliative - pending     Plan:   - Pantoprazole 40mg PO BID  -Onc following, appreciate recs   -Palliative Care following, appreciate recs   - F/U biopsy results - expedited  - Awaiting MMR results from specimen Biopsy proven in [facility] from endoscopy on 8/12/24.   Hospital course with Episodes of coffee ground emesis on 8/19, and dark stools 8/19-8/20, associated with increasing Tbili, transaminanses, and ALP levels.   - IR bx 8/22  - [facility] palliative - pending     Plan:   - Pantoprazole 40mg PO BID  -Onc following, appreciate recs   -Palliative Care following, appreciate recs   - GI c/s multiple episodes bloody emesis, appreciate recs  - Bx results show metastatic cancer, awaiting MMR PD-1 attendum will follow

## 2024-08-29 NOTE — PROGRESS NOTE ADULT - PROBLEM SELECTOR PLAN 4
see goc note above  HCP completed   Pt defers to HCP Elizabeth(son) to make decisions on his behalf, he wishes not to participate in GOC conversation     Met with family today, as previous GOC discussions family does not want to discuss advanced directives or next steps until it is determined if there are treatment options, they understand the low likelihood that pt will be a candidate for immunotherapy but continue to want to wait for further GOC discussions until results are back.     Palliative is available for IDT family meeting M-F 10A-3P. Please call PCU when IDT family meeting is scheduled to review results and discuss next steps

## 2024-08-29 NOTE — PROGRESS NOTE ADULT - ASSESSMENT
72 y/o M, PMH CAD w/5 stents, HTN, HLD, recent Dx of gastric adenocarcinoma, Italian speaking, presents with burning epigastric pain associated with eating, shortness of breath on exertion x 2 weeks. He was evaluated in [facility] with endoscopy for the abdominal pain, constipation and black stools on 8/12, which showed a bleeding gastric ulcer, biopsy was taken and hemostasis achieved, Biopsy positive for gastric adenocarcinoma. CT imaging revealed metastasis to liver and adjacent lymph nodes. He elected to pursue further care here in the [place], and deferred any treatment in [facility]. Awaiting liver biopsy 8/20.

## 2024-08-29 NOTE — PROGRESS NOTE ADULT - ATTENDING COMMENTS
74 y/o M, PMH CAD w/5 stents, HTN, HLD, recent Dx of gastric adenocarcinoma, Dive[place] speaking, presents with burning epigastric pain associated with eating, shortness of breath on exertion x 2 weeks. He was evaluated in [facility] with endoscopy for the abdominal pain, constipation and black stools on 8/12, which showed a bleeding gastric ulcer, biopsy was taken and hemostasis achieved, Biopsy positive for gastric adenocarcinoma. CT imaging revealed metastasis to liver and adjacent lymph nodes. He elected to pursue further care here in the [place], and deferred any treatment in [facility]. s/p liver biopsy 8/20.     Patient is not a candidate for  chemotherapy given elevated bilirubin. liver biopsy resulted, heme-onc aware, he may be a candidate for immunotherapy    hyponatremia, likely both SIADH and hypovolemic - nephrology recs appreciated - restart 2%NS 8/29 given worsening hyponatremia     Ongoing GOC discussion with family/palliative following     Repeat RUQUS given worsening bilirubin and decreased appetite - no cholecystitis noted     PT consulted given debility.

## 2024-08-30 NOTE — PROGRESS NOTE ADULT - SUBJECTIVE AND OBJECTIVE BOX
DATE OF SERVICE: 08-30-24 @ 07:25    Patient is a 73y old  Male who presents with a chief complaint of Gastric adenocarcinoma (29 Aug 2024 17:42)      SUBJECTIVE / OVERNIGHT EVENTS:  Overnight,  Pt seen and examined at bedside.    ROS negative except as above.    MEDICATIONS  (STANDING):  aspirin  chewable 81 milliGRAM(s) Oral daily  enoxaparin Injectable 40 milliGRAM(s) SubCutaneous every 24 hours  finasteride 5 milliGRAM(s) Oral daily  levothyroxine 50 MICROGram(s) Oral daily  lidocaine   4% Patch 1 Patch Transdermal every 24 hours  pantoprazole    Tablet 40 milliGRAM(s) Oral every 12 hours  polyethylene glycol 3350 17 Gram(s) Oral daily  senna 2 Tablet(s) Oral at bedtime  sodium chloride 2% . 1000 milliLiter(s) (50 mL/Hr) IV Continuous <Continuous>  tamsulosin 0.4 milliGRAM(s) Oral at bedtime  urea Oral Powder 15 Gram(s) Oral every 12 hours    MEDICATIONS  (PRN):  acetaminophen     Tablet .. 650 milliGRAM(s) Oral every 6 hours PRN Temp greater or equal to 38C (100.4F), Mild Pain (1 - 3)  aluminum hydroxide/magnesium hydroxide/simethicone Suspension 30 milliLiter(s) Oral every 4 hours PRN Dyspepsia  melatonin 3 milliGRAM(s) Oral at bedtime PRN Insomnia  ondansetron Injectable 4 milliGRAM(s) IV Push every 8 hours PRN Nausea and/or Vomiting  oxyCODONE    IR 5 milliGRAM(s) Oral every 4 hours PRN Moderate Pain (4 - 6)  oxyCODONE    IR 10 milliGRAM(s) Oral every 4 hours PRN Severe Pain (7 - 10)  simethicone 80 milliGRAM(s) Chew every 8 hours PRN Heartburn      Vital Signs Last 24 Hrs  T(C): 36.8 (30 Aug 2024 04:30), Max: 36.8 (30 Aug 2024 04:30)  T(F): 98.2 (30 Aug 2024 04:30), Max: 98.2 (30 Aug 2024 04:30)  HR: 81 (30 Aug 2024 04:30) (73 - 87)  BP: 124/73 (30 Aug 2024 04:30) (109/64 - 128/78)  BP(mean): --  RR: 18 (30 Aug 2024 04:30) (18 - 18)  SpO2: 94% (30 Aug 2024 04:30) (94% - 95%)    Parameters below as of 30 Aug 2024 04:30  Patient On (Oxygen Delivery Method): room air      CAPILLARY BLOOD GLUCOSE        I&O's Summary    29 Aug 2024 07:01  -  30 Aug 2024 07:00  --------------------------------------------------------  IN: 450 mL / OUT: 200 mL / NET: 250 mL        PHYSICAL EXAM:  GENERAL: NAD, well-developed  HEAD:  Atraumatic, Normocephalic  EYES: EOMI, conjunctiva and sclera clear  NECK: Supple, No JVD  CHEST/LUNG: Clear to auscultation bilaterally; No wheeze  HEART: Regular rate and rhythm; No murmurs, rubs, or gallops  ABDOMEN: Soft, Nontender, Nondistended; Bowel sounds present  EXTREMITIES:  2+ Peripheral Pulses, No clubbing, cyanosis, or edema  NEUROLOGY: AOx3, non-focal  SKIN: No rashes or lesions    LABS:                        10.1   8.63  )-----------( 214      ( 30 Aug 2024 06:50 )             30.9     08-29    129<L>  |  92<L>  |  59<H>  ----------------------------<  95  5.4<H>   |  19<L>  |  1.62<H>    Ca    9.6      29 Aug 2024 22:21  Phos  3.5     08-29  Mg     2.2     08-29    TPro  6.4  /  Alb  2.8<L>  /  TBili  10.8<H>  /  DBili  x   /  AST  417<H>  /  ALT  172<H>  /  AlkPhos  909<H>  08-29            MICRO:      RADIOLOGY & ADDITIONAL TESTS:           DATE OF SERVICE: 08-30-24 @ 07:25    Patient is a 73y old  Male who presents with a chief complaint of Gastric adenocarcinoma (29 Aug 2024 17:42)      SUBJECTIVE / OVERNIGHT EVENTS:  Overnight, no acute events.   Pt seen and examined at bedside. Pt feeling weak this morning.     ROS negative except as above.    MEDICATIONS  (STANDING):  aspirin  chewable 81 milliGRAM(s) Oral daily  enoxaparin Injectable 40 milliGRAM(s) SubCutaneous every 24 hours  finasteride 5 milliGRAM(s) Oral daily  levothyroxine 50 MICROGram(s) Oral daily  lidocaine   4% Patch 1 Patch Transdermal every 24 hours  pantoprazole    Tablet 40 milliGRAM(s) Oral every 12 hours  polyethylene glycol 3350 17 Gram(s) Oral daily  senna 2 Tablet(s) Oral at bedtime  sodium chloride 2% . 1000 milliLiter(s) (50 mL/Hr) IV Continuous <Continuous>  tamsulosin 0.4 milliGRAM(s) Oral at bedtime  urea Oral Powder 15 Gram(s) Oral every 12 hours    MEDICATIONS  (PRN):  acetaminophen     Tablet .. 650 milliGRAM(s) Oral every 6 hours PRN Temp greater or equal to 38C (100.4F), Mild Pain (1 - 3)  aluminum hydroxide/magnesium hydroxide/simethicone Suspension 30 milliLiter(s) Oral every 4 hours PRN Dyspepsia  melatonin 3 milliGRAM(s) Oral at bedtime PRN Insomnia  ondansetron Injectable 4 milliGRAM(s) IV Push every 8 hours PRN Nausea and/or Vomiting  oxyCODONE    IR 5 milliGRAM(s) Oral every 4 hours PRN Moderate Pain (4 - 6)  oxyCODONE    IR 10 milliGRAM(s) Oral every 4 hours PRN Severe Pain (7 - 10)  simethicone 80 milliGRAM(s) Chew every 8 hours PRN Heartburn      Vital Signs Last 24 Hrs  T(C): 36.8 (30 Aug 2024 04:30), Max: 36.8 (30 Aug 2024 04:30)  T(F): 98.2 (30 Aug 2024 04:30), Max: 98.2 (30 Aug 2024 04:30)  HR: 81 (30 Aug 2024 04:30) (73 - 87)  BP: 124/73 (30 Aug 2024 04:30) (109/64 - 128/78)  BP(mean): --  RR: 18 (30 Aug 2024 04:30) (18 - 18)  SpO2: 94% (30 Aug 2024 04:30) (94% - 95%)    Parameters below as of 30 Aug 2024 04:30  Patient On (Oxygen Delivery Method): room air      CAPILLARY BLOOD GLUCOSE        I&O's Summary    29 Aug 2024 07:01  -  30 Aug 2024 07:00  --------------------------------------------------------  IN: 450 mL / OUT: 200 mL / NET: 250 mL        PHYSICAL EXAM:  GENERAL: NAD, lethargic   HEAD:  Atraumatic, Normocephalic  EYES: EOMI, conjunctiva and sclera clear  NECK: Supple, No JVD  CHEST/LUNG: Clear to auscultation bilaterally; No wheeze  HEART: Regular rate and rhythm; No murmurs, rubs, or gallops  ABDOMEN: Soft, Nontender, Nondistended; Bowel sounds present  EXTREMITIES:  2+ Peripheral Pulses, No clubbing, cyanosis, or edema  NEUROLOGY: AOx3, non-focal  SKIN: No rashes or lesions    LABS:                        10.1   8.63  )-----------( 214      ( 30 Aug 2024 06:50 )             30.9     08-29    129<L>  |  92<L>  |  59<H>  ----------------------------<  95  5.4<H>   |  19<L>  |  1.62<H>    Ca    9.6      29 Aug 2024 22:21  Phos  3.5     08-29  Mg     2.2     08-29    TPro  6.4  /  Alb  2.8<L>  /  TBili  10.8<H>  /  DBili  x   /  AST  417<H>  /  ALT  172<H>  /  AlkPhos  909<H>  08-29            MICRO:      RADIOLOGY & ADDITIONAL TESTS:

## 2024-08-30 NOTE — CONSULT NOTE ADULT - SUBJECTIVE AND OBJECTIVE BOX
HPI:  72 y/o M, PMH CAD w/5 stents, HTN, HLD, recent Dx of gastric adenocarcinoma, [facility] speaking, presents with burning epigastric pain associated with eating x 2 days, shortness of breath on exertion x 2 weeks. He was in his usual state of health until 2 weeks ago when he developed diffuse burning abdominal discomfort and constipation while in [facility]. He noticed shortness of breath when walking fast. He reports episodes of black stools on 8/1 and 8/4. He saw a physician there who found occult blood in his stool. He was referred for endoscopy on 8/12, which showed a bleeding gastric ulcer, biopsy was taken and hemostasis achieved. Biopsy report reveals gastric adenocarcinoma. Further imaging for staging revealed metastasis to liver and adjacent lymph nodes. He had one episode of black stools the day after endoscopy and normal stools since then. He elected to pursue further care here in the [place], and deferred any treatment in [facility].     He reports two episodes of nausea last week, states he had an episode of chills and diffuse burning sensation in his back after receiving IV contrast during his CT scan in [facility]. Son states he received 4 "bags" of blood products for low hemoglobin in [facility]. He currently denies any symptoms at rest, denies any fever, significant weight loss, chest pain, vomiting, dysuria, back pain, or diarrhea. His last bowel movement was yesterday, yellow and brown in color.     He grew up in [facility], worked as , was a former smoker for 20 years and quit in 1986.   [place] cardiac [place], [place] x2, 2013 x1, 2014 x1, 2023x1.    In the ED, Hgb 12.8, Bili 1.5, Alk Phos 978, , ALT 71, lactate 5.4. CT/ CTA negative for PE or pulmonary mets, shows many hepatics mets and gastrohepatic lymphadenopathy.    (16 Aug 2024 15:23)    Oncologic history:   73 year old man, former smoker for 20 years and quit in 1986, with pmhx of CAD (5 stents placed), HTN, HLD and a recent diagnosis of gastric adenocarcinoma (diagnosed in [facility] early August). He presented to the ED with burning epigastric pain associated with eating x 2 days, shortness of breath on exertion x 2 weeks.     Patient was in his usual state of health until 2 weeks ago when he developed diffuse burning abdominal discomfort and constipation while in [facility]. He noticed shortness of breath, followed by several episodes of melena. FOBT was positive and he underwent EGD on 8/12 which revealed a bleeding gastric ulcer. A biopsy was taken and hemostasis achieved. Biopsy report reveals gastric adenocarcinoma. Further imaging for staging revealed metastasis to liver and adjacent lymph nodes. He had one episode of black stools the day after endoscopy and normal stools since then. He elected to pursue further care here in the [place], and deferred any treatment in [facility].     Since hospitalization, HGB has remained relatively stable, between 10-11 with no transfusions. His LFTs have been progressively elevating which makes him a poor chemotherapy candidate. IO is a possibility per heme/onc, however pending further path analysis. Patient also noted to have hyponatremia for which nephro is now following. GI has been consulted and no EGD is being offered at this time due to hgb stability.     GOC discussions have been ongoing.     CT C/A/P on admission (8/16) showed Mass within the wall of the lesser curvature of the gastric body. A large number of metastases replace the majority of the hepatic parenchyma with resulting mild hepatomegaly with gastrohepatic lymphadenopathy.      MRI abdomen 8/18 showed Innumerable large liver metastasis are noted (larger lesions in the right liver is approximately 5 cm), causing significant compression and distortion of the intrahepatic bile ducts. 2.7 cm gastric mass.     US liver bx from 8/22 shows adenocarcinoma.     KPS: 40-50    Allergies    No Known Allergies    Intolerances        ROS: [  ] Fever  [  ] Chills  [  ]Chest Pain [  ] SOB  [  ]Cough [  ] N/V  [  ] Diarrhea [  ]Constipation [  ]Other ROS: RUQ discomfort  [  ] ROS otherwise negative    PAST MEDICAL & SURGICAL HISTORY:  Hyperlipidemia    Stented coronary artery    History of BPH    Hypertension    CAD (coronary artery disease)    Hypothyroid    Stented coronary artery    No significant past surgical history        FAMILY HISTORY:  No pertinent family history in first degree relatives      MEDICATIONS  (STANDING):  aspirin  chewable 81 milliGRAM(s) Oral daily  finasteride 5 milliGRAM(s) Oral daily  heparin   Injectable 5000 Unit(s) SubCutaneous every 8 hours  levothyroxine 50 MICROGram(s) Oral daily  lidocaine   4% Patch 1 Patch Transdermal every 24 hours  pantoprazole    Tablet 40 milliGRAM(s) Oral every 12 hours  polyethylene glycol 3350 17 Gram(s) Oral daily  senna 2 Tablet(s) Oral at bedtime  sodium chloride 2% . 1000 milliLiter(s) (50 mL/Hr) IV Continuous <Continuous>  tamsulosin 0.4 milliGRAM(s) Oral at bedtime    MEDICATIONS  (PRN):  acetaminophen     Tablet .. 650 milliGRAM(s) Oral every 6 hours PRN Temp greater or equal to 38C (100.4F), Mild Pain (1 - 3)  aluminum hydroxide/magnesium hydroxide/simethicone Suspension 30 milliLiter(s) Oral every 4 hours PRN Dyspepsia  melatonin 3 milliGRAM(s) Oral at bedtime PRN Insomnia  ondansetron Injectable 4 milliGRAM(s) IV Push every 8 hours PRN Nausea and/or Vomiting  oxyCODONE    IR 10 milliGRAM(s) Oral every 4 hours PRN Severe Pain (7 - 10)  oxyCODONE    IR 5 milliGRAM(s) Oral every 4 hours PRN Moderate Pain (4 - 6)  simethicone 80 milliGRAM(s) Chew every 8 hours PRN Heartburn      PHYSICAL EXAM  Vital Signs Last 24 Hrs  T(C): 36.8 (30 Aug 2024 04:30), Max: 36.8 (30 Aug 2024 04:30)  T(F): 98.2 (30 Aug 2024 04:30), Max: 98.2 (30 Aug 2024 04:30)  HR: 81 (30 Aug 2024 04:30) (73 - 81)  BP: 124/73 (30 Aug 2024 04:30) (124/73 - 128/78)  BP(mean): --  RR: 18 (30 Aug 2024 04:30) (18 - 18)  SpO2: 94% (30 Aug 2024 04:30) (94% - 95%)    Parameters below as of 30 Aug 2024 04:30  Patient On (Oxygen Delivery Method): room air    General: Ill appearing male  HEENT: NC/AT; EOMI, PERRL, sclera nonicteric; external ears normal; no rhinorrhea or epistaxis; mucous membranes moist; oropharynx clear and without erythema  CV: NR, RR; no appreciable r/m/g  Lungs: CTAB, no increased work of breathing  Abdomen: tender to palpation  MSK: Vertebral spine non-tender to palpation  Neuro: AAOx3; cranial nerves II-XII intact; strength 5/5 in upper and lower extremities; sensation to light touch in tact bilaterally.  Psych: Full affect; mood congruent  Skin: no visible rashes on limited examination    IMAGING/LABS/PATHOLOGY: I have personally reviewed the relevant labs, pathology, and imaging as noted in the HPI.    ASSESSMENT/PLAN    RAJENDRA VASQUEZ is a 73y man with newly diagnosed metastatic gastric adenocarcinoma, EGD on 8/12 which revealed a bleeding gastric ulcer bx positive for adenoca. He presents with sob and mild melena/hematemesis. Further workup has shown high metastatic burden with a liver bx confirming metastatic disease.     Given that his hgb is currently stable not requiring transfusions, radiation is not indicated at this time.   We did discuss that should the patient become more symptomatic in terms of pain or bleeding, that we could consider a short course of palliative radiation therapy.   This would require a transfer to [facility] if offered, and should be discussed prior to moving the patient to ensure it aligns with GOC.

## 2024-08-30 NOTE — PROGRESS NOTE ADULT - PROBLEM SELECTOR PLAN 1
Biopsy proven in [facility] from endoscopy on 8/12/24.   Hospital course with Episodes of coffee ground emesis on 8/19, and dark stools 8/19-8/20, associated with increasing Tbili, transaminanses, and ALP levels.   - IR bx 8/22  - [facility] palliative - pending     Plan:   - Pantoprazole 40mg PO BID  -Onc following, appreciate recs   -Palliative Care following, appreciate recs   - GI c/s multiple episodes bloody emesis, appreciate recs  - Bx results show metastatic cancer, awaiting MMR PD-1 attendum will follow Biopsy proven in [facility] from endoscopy on 8/12/24.   Hospital course with Episodes of coffee ground emesis on 8/19, and dark stools 8/19-8/20, associated with increasing Tbili, transaminanses, and ALP levels.   - IR bx 8/22  - [facility] palliative - pending     Plan:   - Pantoprazole 40mg PO BID  -Onc following, appreciate recs   - Rad onc c/s palliative radiation, appreciate recs   -Palliative Care following, appreciate recs   - GI c/s multiple episodes bloody emesis, appreciate recs  - Bx results show metastatic cancer, awaiting MMR PD-1 attendum will follow

## 2024-08-30 NOTE — PROGRESS NOTE ADULT - ASSESSMENT
73 year old male, PMH HTN, HLD, CAD w/ stents, presenting with SOB, recent diagnosis of stomach adenocarcinoma in [facility] Oncology consulted for further recommendations    #Newly diagnosed metastatic gastric adenocarcinoma in [facility]  #Transaminitis/hyperbilirubinemia likely 2/2 to infiltrative disease  - Patient recently visiting family in [facility], had full workup performed there for epigastric pain and melena, including egd with pathology showing adenocarcinoma and imaging showing mets to liver and surround adenopathy.. Patient complains of shortness of breath for the past week worse with exertion and for the past 2 days complaining of nausea and epigastric pain with the eating with subjective 5kg weight loss over past few days. In the ED, Hgb 12.8, Bili 1.5, Alk Phos 978, , ALT 71, lactate 5.4. Reports last screening colonoscopy was 2011 and 2018. No prior endoscopy apart from this past EGD in [facility] Family brought reports from [facility] at beside   - CTA negative for PE or pulmonary mets, show mass within the wall of the lesser curvature of the gastric body   representing the patient's gastric malignancy, many hepatics mets and gastrohepatic lymphadenopathy.   - MR abd/pelvis: Innumerable large liver metastasis are noted, causing significant compression and distortion of the intrahepatic bile ducts; the hilar region of the bowel demonstrates somewhat narrowed and there is associated left sided intrahepatic biliary dilatation with a left hepatic duct measuring up to 5 mm.The common hepatic duct and the common bile duct are within normal limits and measures up to 4 mm. No evidence for choledocholithiasis or a CBD stricture.No evidence for cholelithiasis or cholecystitis. Known gastric mass present.  - Pending IR guided liver biopsy 8/20 however given hyponatremia and coffee ground emesis was deferred as initially was considering EGD via GI however EGD differed at this time   - Given uptrending bilirubin levels and MR findings, discussed with advanced GI, less likely to be 2/2 to stricture and no benefit from palliative stent.   - Appreciate palliative care eval.   - Adjusted pain regimen, oxycontin 10 mg q4 prn, bowel regimen per primary team   - HIV, hepatitis panel negative  -8/21/24: GOC discussion occurred alongside the palliative, primary team, patients son and uncle. Initially, inpatient palliative chemotherapy was considered as a treatment option. However, given the current clinical scenario, including the extensive metastatic disease, rising bilirubin levels, and the patient’s overall declining functional status, we now have concerns that chemotherapy may not be beneficial. In fact, administering chemotherapy at this stage could potentially exacerbate the patient’s condition and negatively impact their quality of life. If the patient remains stable, we recommend pursuing a biopsy to determine MMR status (although there is a very low percentage of this being positive) and to assess eligibility for immunotherapy. If not eligible, we would strongly recommend hospice care.  - S/p IR-guided liver biopsy on 8/22/24: pathology showed MMR proteins with retained expression  - 8/30: GOC discussion with patient and son. Discussed that chemotherapy is not an option given hyperbilirubinemia. Discussed with pMMR status, and extensive liver disease burden, immunotherapy is unlikely to be beneficial for him and will likely cause more harm than benefit. Discussed recommendation for comfort based measures at this time.    Recommend:  - recommend comfort based care at this time  - Expediting PD-L1 results, though we discussed that immunotherapy will likely cause more harm than benefit at this time given his extensive disease burden/poor liver function   - Palliative Care consult for ongoing GOC and to discuss palliative care and comfort care options     Case discussed w/ Dr. Surya Rosas, PGY-5  Fellow Hematology/Oncology  pager [phone]  Available on TEAMS  After 5pm or on weekends please contact  to page on-call fellow   73 year old male, PMH HTN, HLD, CAD w/ stents, presenting with SOB, recent diagnosis of stomach adenocarcinoma in [facility] Oncology consulted for further recommendations    #Newly diagnosed metastatic gastric adenocarcinoma in [facility]  #Transaminitis/hyperbilirubinemia likely 2/2 to infiltrative disease  - Patient recently visiting family in [facility], had full workup performed there for epigastric pain and melena, including egd with pathology showing adenocarcinoma and imaging showing mets to liver and surround adenopathy.. Patient complains of shortness of breath for the past week worse with exertion and for the past 2 days complaining of nausea and epigastric pain with the eating with subjective 5kg weight loss over past few days. In the ED, Hgb 12.8, Bili 1.5, Alk Phos 978, , ALT 71, lactate 5.4. Reports last screening colonoscopy was 2011 and 2018. No prior endoscopy apart from this past EGD in [facility] Family brought reports from [facility] at beside   - CTA negative for PE or pulmonary mets, show mass within the wall of the lesser curvature of the gastric body   representing the patient's gastric malignancy, many hepatics mets and gastrohepatic lymphadenopathy.   - MR abd/pelvis: Innumerable large liver metastasis are noted, causing significant compression and distortion of the intrahepatic bile ducts; the hilar region of the bowel demonstrates somewhat narrowed and there is associated left sided intrahepatic biliary dilatation with a left hepatic duct measuring up to 5 mm.The common hepatic duct and the common bile duct are within normal limits and measures up to 4 mm. No evidence for choledocholithiasis or a CBD stricture.No evidence for cholelithiasis or cholecystitis. Known gastric mass present.  - Pending IR guided liver biopsy 8/20 however given hyponatremia and coffee ground emesis was deferred as initially was considering EGD via GI however EGD differed at this time   - Given uptrending bilirubin levels and MR findings, discussed with advanced GI, less likely to be 2/2 to stricture and no benefit from palliative stent.   - Appreciate palliative care eval.   - Adjusted pain regimen, oxycontin 10 mg q4 prn, bowel regimen per primary team   - HIV, hepatitis panel negative  -8/21/24: GOC discussion occurred alongside the palliative, primary team, patients son and uncle. Initially, inpatient palliative chemotherapy was considered as a treatment option. However, given the current clinical scenario, including the extensive metastatic disease, rising bilirubin levels, and the patient’s overall declining functional status, we now have concerns that chemotherapy may not be beneficial. In fact, administering chemotherapy at this stage could potentially exacerbate the patient’s condition and negatively impact their quality of life. If the patient remains stable, we recommend pursuing a biopsy to determine MMR status (although there is a very low percentage of this being positive) and to assess eligibility for immunotherapy. If not eligible, we would strongly recommend hospice care.  - S/p IR-guided liver biopsy on 8/22/24: pathology showed MMR proteins with retained expression  - 8/30: GOC discussion with patient and son. Discussed that chemotherapy is not an option given hyperbilirubinemia. Discussed with pMMR status, and extensive liver disease burden, immunotherapy is unlikely to be beneficial for him and will likely cause more harm than benefit. Discussed recommendation for comfort based measures at this time.    Recommend:  - recommend comfort based care at this time  - Expediting PD-L1 results, though we discussed that immunotherapy will likely cause more harm than benefit at this time given his extensive disease burden/poor liver function   - Appreciate ongoing Palliative Care involvement to discuss palliative care and comfort care options at this time    Case discussed w/ Dr. Surya Rosas, PGY-5  Fellow Hematology/Oncology  pager [phone]  Available on TEAMS  After 5pm or on weekends please contact  to page on-call fellow

## 2024-08-30 NOTE — PROGRESS NOTE ADULT - PROBLEM SELECTOR PLAN 6
2/2 thrombophlebitis. Small 3cm circular area of redness with associated induration and TTP over dorsum of L hand, no streaking, no spreading redness, no fluctuance, Sensorimotor function of L hand intact. Reports frequent needle sticks in L hand during recent hospitalization in [facility].  -L hand U/S of soft tissue neg for abscess  -warm compresses prn

## 2024-08-30 NOTE — PROGRESS NOTE ADULT - ASSESSMENT
73 year old male with PMH of CAD w/5 stents, HTN, HLD, recent Dx of gastric adenocarcinoma, Ma[place] speaking, presents with burning epigastric pain associated with eating x 2 days, shortness of breath on exertion x 2 weeks. Hospital course thus farhas been complicated by coffee ground emesis and abnormal liver enzymes. Pt underwent for IR guided biopsy of liver mets 8/22. The nephrology team was consulted for hyponatremia.    Hyponatremia   likely in setting of poor solute intake and SIADH  abdominal pain stimulating ADH  urine lytes reviewed; consistent with same     plan   SNa improved to 129 most recently; pending AM labs   s/p HTS with 2% at 50cc/hour for 12 hours yesterday   repeat urine studies consistent with volume depletion  acute drop likely from increased water intake and nausea stimulating ADH  encourage PO intake; protein shakes with meals   maintain free water limit  avoid hypotonic solutions   monitor serum sodium     LUKE   likely from volume depletion   Creatinine Trend: 1.62 <--, 1.05 <--, 0.99 <--, 0.91 <--, 0.94 <--, 0.92  pending morning labs  urine studies reviewed   please check for urinary retention  Monitor labs and urine output. Avoid nephrotoxins. Dose medications as per eGFR.    Hyperkalemia   from LUKE   maintain a low K diet   pending labs from this morning   will likely require further IVF today    If any questions, please feel free to contact me     Dandre Gaming  Nephrology Attending  Cell #[phone]

## 2024-08-30 NOTE — PROGRESS NOTE ADULT - ATTENDING COMMENTS
72 y/o M, PMH CAD w/5 stents, HTN, HLD, recent Dx of gastric adenocarcinoma, [place] speaking, presents with burning epigastric pain associated with eating, shortness of breath on exertion x 2 weeks. He was evaluated in [facility] with endoscopy for the abdominal pain, constipation and black stools on 8/12, which showed a bleeding gastric ulcer, biopsy was taken and hemostasis achieved, Biopsy positive for gastric adenocarcinoma. CT imaging revealed metastasis to liver and adjacent lymph nodes. He elected to pursue further care here in the [place], and deferred any treatment in [facility]. s/p liver biopsy 8/20.     Patient is not a candidate for  chemotherapy given elevated bilirubin. liver biopsy resulted, heme-onc aware, he may be a candidate for immunotherapy     hyponatremia, likely both SIADH and hypovolemic - nephrology recs appreciated - getting another run of  2%NS 8/30. obtain bladder scan. yisel urena    Ongoing GOC discussion with family/palliative following     Repeat RUQUS given worsening bilirubin and decreased appetite - no cholecystitis noted     PT consulted given debility.

## 2024-08-30 NOTE — PROGRESS NOTE ADULT - PROBLEM SELECTOR PLAN 2
Hyponatremic 127/128 since admit. 8/27 124. 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     Na 123 from 128     Plan:  - Fluid restricted halal diet   - Hypertonic 2% NaCl x 12 hours   - Trend BMP q8 per nephro till na > 130  - Start urea tabs, per nephro   - Asymptomatic, persistent poor pO intake  - Neprho following, appreciate recs Hyponatremic 127/128 since admit. 8/27 124. 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     Na 123 from 128     Plan:  - Fluid restricted halal diet   - Hypertonic 2% NaCl x 12 hours again today   - Trend BMP q8 per nephro till na > 130  - Start urea tabs, per nephro   - Asymptomatic, persistent poor pO intake  - Neprho following, appreciate recs

## 2024-08-30 NOTE — PROGRESS NOTE ADULT - SUBJECTIVE AND OBJECTIVE BOX
INTERVAL HPI/OVERNIGHT EVENTS:  Patient S&E at bedside. No o/n events, he appears lethargic. Reports abdominal discomfort.     REVIEW OF SYSTEMS  General:+Fatigue  Skin: No rash  ENMT: No sore throat, no dysphagia, no mouth sores	  Respiratory and Thorax: No dyspnea, no cough, no wheezing  Cardiovascular: No chest pain, no palpitations  Gastrointestinal:+abdominal pain  Genitourinary: No dysuria  Musculoskeletal:	No joint pain, no muscle pain  Neurological:	No dizziness, no neuropathy  Psychiatric: No depression or anxiety  Hematology/Lymphatics: No easy bleeding or bruising, no swollen nodes noticed.       VITAL SIGNS:  T(F): 98.2 (08-30-24 @ 13:30)  HR: 81 (08-30-24 @ 13:30)  BP: 131/82 (08-30-24 @ 13:30)  RR: 18 (08-30-24 @ 13:30)  SpO2: 94% (08-30-24 @ 13:30)  Wt(kg): --    PHYSICAL EXAM:    Constitutional: NAD, resting in bed  Eyes: EOMI, +scleral icterus  Neck: supple  Respiratory: CTA b/l, good air entry b/l  Cardiovascular: RRR  Gastrointestinal: soft, +mild tenderness to palpation  Extremities: no LE edema  Neurological: AAOx3      MEDICATIONS  (STANDING):  aspirin  chewable 81 milliGRAM(s) Oral daily  finasteride 5 milliGRAM(s) Oral daily  heparin   Injectable 5000 Unit(s) SubCutaneous every 8 hours  levothyroxine 50 MICROGram(s) Oral daily  lidocaine   4% Patch 1 Patch Transdermal every 24 hours  pantoprazole    Tablet 40 milliGRAM(s) Oral every 12 hours  polyethylene glycol 3350 17 Gram(s) Oral daily  senna 2 Tablet(s) Oral at bedtime  sodium chloride 2% . 1000 milliLiter(s) (50 mL/Hr) IV Continuous <Continuous>  tamsulosin 0.4 milliGRAM(s) Oral at bedtime    MEDICATIONS  (PRN):  acetaminophen     Tablet .. 650 milliGRAM(s) Oral every 6 hours PRN Temp greater or equal to 38C (100.4F), Mild Pain (1 - 3)  aluminum hydroxide/magnesium hydroxide/simethicone Suspension 30 milliLiter(s) Oral every 4 hours PRN Dyspepsia  melatonin 3 milliGRAM(s) Oral at bedtime PRN Insomnia  ondansetron Injectable 4 milliGRAM(s) IV Push every 8 hours PRN Nausea and/or Vomiting  oxyCODONE    IR 10 milliGRAM(s) Oral every 4 hours PRN Severe Pain (7 - 10)  oxyCODONE    IR 5 milliGRAM(s) Oral every 4 hours PRN Moderate Pain (4 - 6)  simethicone 80 milliGRAM(s) Chew every 8 hours PRN Heartburn      Allergies    No Known Allergies    Intolerances        LABS:                        10.1   8.63  )-----------( 214      ( 30 Aug 2024 06:50 )             30.9     08-30    126<L>  |  93<L>  |  62<H>  ----------------------------<  83  5.9<H>   |  15<L>  |  1.61<H>    Ca    9.6      30 Aug 2024 06:46  Phos  4.1     08-30  Mg     2.3     08-30    TPro  6.5  /  Alb  2.7<L>  /  TBili  10.5<H>  /  DBili  x   /  AST  893<H>  /  ALT  310<H>  /  AlkPhos  958<H>  08-30      Urinalysis Basic - ( 30 Aug 2024 06:46 )    Color: x / Appearance: x / SG: x / pH: x  Gluc: 83 mg/dL / Ketone: x  / Bili: x / Urobili: x   Blood: x / Protein: x / Nitrite: x   Leuk Esterase: x / RBC: x / WBC x   Sq Epi: x / Non Sq Epi: x / Bacteria: x        RADIOLOGY & ADDITIONAL TESTS:  Studies reviewed.

## 2024-08-30 NOTE — PROGRESS NOTE ADULT - ASSESSMENT
72 y/o M, PMH CAD w/5 stents, HTN, HLD, recent Dx of gastric adenocarcinoma, Urdu speaking, presents with burning epigastric pain associated with eating, shortness of breath on exertion x 2 weeks. He was evaluated in [facility] with endoscopy for the abdominal pain, constipation and black stools on 8/12, which showed a bleeding gastric ulcer, biopsy was taken and hemostasis achieved, Biopsy positive for gastric adenocarcinoma. CT imaging revealed metastasis to liver and adjacent lymph nodes. He elected to pursue further care here in the [place], and deferred any treatment in [facility]. Awaiting liver biopsy 8/20.

## 2024-08-30 NOTE — PROGRESS NOTE ADULT - ATTENDING COMMENTS
73 year old male, PMH HTN, HLD, CAD w/ stents, diagnosed with gastric adenocarcinoma with liver mets and significant hyperbilirubinemia.  Results from biopsy discussed with the patient, son and another physician family member (per son's request).  Discussed overall poor functional status, poor nutritional status, worsening liver and kidney function.  Patient is not a candidate for chemotherapy.  Unlikely candidate for immunotherapy (PD-L1 pending but MMR stable).  Discussed single agent immunotherapy will not have great response rates and overall with heavy burden liver mets and liver failure, unlikely to get any significant benefit.  Recommend palliative care consult and hospice.

## 2024-08-30 NOTE — PROGRESS NOTE ADULT - SUBJECTIVE AND OBJECTIVE BOX
[facility] : Ans Serv [phone], Office [phone]  Dr. Gaming/Dr Johnson/Dr Granados  /Dr Ac floyd /Dr ALLY Holbrook/Dr Elvin Georges/Dr Esteban Guzman /Dr CONSUELO Dhillon  _______________________________________________________________________________________________    Pt seen and examined this morning   continues to have nausea and vomiting  not eating very well     VITALS:  T(F): 98.2 (08-30 @ 04:30), Max: 98.2 (08-30 @ 04:30)  HR: 81 (08-30 @ 04:30)  BP: 124/73 (08-30 @ 04:30)  ABP: --  RR: 18 (08-30 @ 04:30)  SpO2: 94% (08-30 @ 04:30)    08-29 @ 07:01  -  08-30 @ 07:00  --------------------------------------------------------  IN: 450 mL / OUT: 200 mL / NET: 250 mL      Physical Exam :-  Constitutional: ill appearing, frail  HEENT: anicteric sclera, oropharynx clear, MMM  Neck: supple.   Respiratory: Bilateral equal breath sounds , no wheezes, no crackles  Cardiovascular: S1, S2, Regular  Gastrointestinal: tender to palpation  Extremities: No peripheral edema  Neurological: awake and alert      Data:-  Allergies :   No Known Allergies    Hospital Medications:   MEDICATIONS  (STANDING):  aspirin  chewable 81 milliGRAM(s) Oral daily  enoxaparin Injectable 40 milliGRAM(s) SubCutaneous every 24 hours  finasteride 5 milliGRAM(s) Oral daily  levothyroxine 50 MICROGram(s) Oral daily  lidocaine   4% Patch 1 Patch Transdermal every 24 hours  pantoprazole    Tablet 40 milliGRAM(s) Oral every 12 hours  polyethylene glycol 3350 17 Gram(s) Oral daily  senna 2 Tablet(s) Oral at bedtime  sodium chloride 2% . 1000 milliLiter(s) (50 mL/Hr) IV Continuous <Continuous>  tamsulosin 0.4 milliGRAM(s) Oral at bedtime  urea Oral Powder 15 Gram(s) Oral every 12 hours    08-29    129<L>  |  92<L>  |  59<H>  ----------------------------<  95  5.4<H>   |  19<L>  |  1.62<H>    Ca    9.6      29 Aug 2024 22:21  Phos  3.5     08-29  Mg     2.2     08-29    TPro  6.4  /  Alb  2.8<L>  /  TBili  10.8<H>  /  DBili      /  AST  417<H>  /  ALT  172<H>  /  AlkPhos  909<H>  08-29    Creatinine Trend: 1.62 <--, 1.05 <--, 0.99 <--, 0.91 <--, 0.94 <--, 0.92 <--  egfr trend : 44 <--, 74 <--, 79 <--, 88 <--, 85 <--, 87 <--, 86 <--                        10.1   8.63  )-----------( 214      ( 30 Aug 2024 06:50 )             30.9

## 2024-08-31 NOTE — PROGRESS NOTE ADULT - PROBLEM SELECTOR PLAN 2
Hyponatremic 127/128 since admit. 8/27 124. 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      Plan:  - Fluid restricted halal diet   - Hypertonic 2% NaCl x 12 hours trialed 8/31  - Trend BMP q8 per nephro till na > 130  - Start urea tabs, per nephro   - Asymptomatic, persistent poor pO intake  - Nephro following, appreciate recs

## 2024-08-31 NOTE — PROGRESS NOTE ADULT - ASSESSMENT
73 year old male with PMH of CAD w/5 stents, HTN, HLD, recent Dx of gastric adenocarcinoma, French speaking, presents with burning epigastric pain associated with eating x 2 days, shortness of breath on exertion x 2 weeks. Hospital course thus farhas been complicated by coffee ground emesis and abnormal liver enzymes. Pt underwent for IR guided biopsy of liver mets 8/22. The nephrology team was consulted for hyponatremia.    Hyponatremia   likely in setting of poor solute intake and SIADH  abdominal pain stimulating ADH  urine lytes reviewed; consistent with same   SNa improving 129>131   plan   s/p HTS with 2% at 50cc/hour for 12 hours yesterday   encourage PO intake; protein shakes with meals   maintain free water limit  avoid hypotonic solutions   monitor serum sodium daily    LUKE   likely from volume depletion   Creatinine Trend: 1.43 <--, 1.30 <--, 1.47 <--, 1.61 <--, 1.62 <--, 1.05 <--, 0.99 <--, 0.91 <--, 0.94 <--, 0.92 <--, 0.93 <--, 0.87 <--  pending morning labs  urine studies reviewed   please check for urinary retention  Monitor labs and urine output. Avoid nephrotoxins. Dose medications as per eGFR.    Hyperkalemia -K better  2/2 LUKE   maintain a low K diet     If any questions, please feel free to contact me   will closely follow up.   poc d/w pt's son  labs, chart reviewed  For any question, pl call:  Nephrology  Cell -[phone]  Office [phone]  Ans Serv [phone]

## 2024-08-31 NOTE — PROGRESS NOTE ADULT - SUBJECTIVE AND OBJECTIVE BOX
Ifeanyi Barron | PGY3| [employer] TEAMS ONLY  Interval Events: No acute events overnight. Pt seen and examined at bedside. Na stable. Patient denies any complaints overnight. The patient denies any fevers, chills, nausea, vomiting, or increased pain.    OBJECTIVE:  ICU Vital Signs Last 24 Hrs  T(C): 37.1 (31 Aug 2024 05:37), Max: 37.1 (31 Aug 2024 05:37)  T(F): 98.7 (31 Aug 2024 05:37), Max: 98.7 (31 Aug 2024 05:37)  HR: 85 (31 Aug 2024 05:37) (81 - 85)  BP: 110/64 (31 Aug 2024 05:37) (110/64 - 134/69)  BP(mean): --  ABP: --  ABP(mean): --  RR: 18 (31 Aug 2024 05:37) (18 - 18)  SpO2: 94% (31 Aug 2024 05:37) (94% - 98%)    O2 Parameters below as of 31 Aug 2024 05:37  Patient On (Oxygen Delivery Method): room air      08-30 @ 07:01  -  08-31 @ 07:00  --------------------------------------------------------  IN: 300 mL / OUT: 0 mL / NET: 300 mL      CAPILLARY BLOOD GLUCOSE      POCT Blood Glucose.: 98 mg/dL (30 Aug 2024 08:35)      PHYSICAL EXAM:  General: WN/WD NAD  Neurology: A&Ox3, nonfocal, ABBASI x 4  Eyes: PERRLA/ EOMI, Gross vision intact  ENT/Neck: Neck supple, trachea midline, No JVD, Gross hearing intact  Respiratory: CTA B/L, No wheezing, rales, rhonchi  CV: RRR, +S1/S2, -S3/S4, no murmurs, rubs or gallops  Abdominal: Soft, NT, ND +BS, No HSM  MSK: 5/5 strength UE/LE bilaterally  Extremities: No edema, 2+ peripheral pulses  Skin: No Rashes, Hematoma, Ecchymosis  Incisions:   Tubes:    HOSPITAL MEDICATIONS:  MEDICATIONS  (STANDING):  aspirin  chewable 81 milliGRAM(s) Oral daily  finasteride 5 milliGRAM(s) Oral daily  heparin   Injectable 5000 Unit(s) SubCutaneous every 8 hours  levothyroxine 50 MICROGram(s) Oral daily  lidocaine   4% Patch 1 Patch Transdermal every 24 hours  pantoprazole    Tablet 40 milliGRAM(s) Oral every 12 hours  polyethylene glycol 3350 17 Gram(s) Oral daily  senna 2 Tablet(s) Oral at bedtime  tamsulosin 0.4 milliGRAM(s) Oral at bedtime    MEDICATIONS  (PRN):  acetaminophen     Tablet .. 650 milliGRAM(s) Oral every 6 hours PRN Temp greater or equal to 38C (100.4F), Mild Pain (1 - 3)  aluminum hydroxide/magnesium hydroxide/simethicone Suspension 30 milliLiter(s) Oral every 4 hours PRN Dyspepsia  melatonin 3 milliGRAM(s) Oral at bedtime PRN Insomnia  ondansetron Injectable 4 milliGRAM(s) IV Push every 8 hours PRN Nausea and/or Vomiting  oxyCODONE    IR 10 milliGRAM(s) Oral every 4 hours PRN Severe Pain (7 - 10)  oxyCODONE    IR 5 milliGRAM(s) Oral every 4 hours PRN Moderate Pain (4 - 6)  simethicone 80 milliGRAM(s) Chew every 8 hours PRN Heartburn      LABS:                        10.1   8.63  )-----------( 214      ( 30 Aug 2024 06:50 )             30.9     Hgb Trend: 10.1<--, 10.3<--, 10.7<--, 11.3<--, 10.8<--  08-31    129<L>  |  96  |  70<H>  ----------------------------<  111<H>  4.9   |  14<L>  |  1.30    Ca    10.1      31 Aug 2024 01:17  Phos  4.1     08-30  Mg     2.3     08-30    TPro  6.5  /  Alb  2.7<L>  /  TBili  10.5<H>  /  DBili  x   /  AST  893<H>  /  ALT  310<H>  /  AlkPhos  958<H>  08-30    Creatinine Trend: 1.30<--, 1.47<--, 1.61<--, 1.62<--, 1.05<--, 0.99<--    Urinalysis Basic - ( 31 Aug 2024 01:17 )    Color: x / Appearance: x / SG: x / pH: x  Gluc: 111 mg/dL / Ketone: x  / Bili: x / Urobili: x   Blood: x / Protein: x / Nitrite: x   Leuk Esterase: x / RBC: x / WBC x   Sq Epi: x / Non Sq Epi: x / Bacteria: x    MICROBIOLOGY:

## 2024-08-31 NOTE — PROGRESS NOTE ADULT - SUBJECTIVE AND OBJECTIVE BOX
[facility] - S Robreta / Mathew S /D Elizabeth/ S Yusef/ S José Manuel/ sEteban Guzman / M Yohannesu/ O Feliciano  service -[phone], office [phone]  ---------------------------------------------------------------------------------------------------------------    Patient seen and examined bedside    Subjective and Objective: No overnight events, sob resolved. No complaints today. feeling better  son bedside    Allergies: No Known Allergies      Hospital Medications:   MEDICATIONS  (STANDING):  aspirin  chewable 81 milliGRAM(s) Oral daily  finasteride 5 milliGRAM(s) Oral daily  heparin   Injectable 5000 Unit(s) SubCutaneous every 8 hours  levothyroxine 50 MICROGram(s) Oral daily  lidocaine   4% Patch 1 Patch Transdermal every 24 hours  pantoprazole    Tablet 40 milliGRAM(s) Oral every 12 hours  polyethylene glycol 3350 17 Gram(s) Oral daily  senna 2 Tablet(s) Oral at bedtime  tamsulosin 0.4 milliGRAM(s) Oral at bedtime      REVIEW OF SYSTEMS:  CONSTITUTIONAL: No weakness, fevers or chills  EYES/ENT: No visual changes;  No vertigo or throat pain   NECK: No pain or stiffness  RESPIRATORY: No cough, wheezing, hemoptysis; No shortness of breath  CARDIOVASCULAR: No chest pain or palpitations.  GASTROINTESTINAL: No abdominal or epigastric pain. No nausea, vomiting, or hematemesis; No diarrhea or constipation. No melena or hematochezia.  GENITOURINARY: No dysuria, frequency, foamy urine, urinary urgency, incontinence or hematuria  NEUROLOGICAL: No numbness or weakness  SKIN: No itching, burning, rashes, or lesions   VASCULAR: No bilateral lower extremity edema.   All other review of systems is negative unless indicated above.    VITALS:  T(F): 99.2 (08-31-24 @ 12:51), Max: 99.2 (08-31-24 @ 12:51)  HR: 105 (08-31-24 @ 12:51)  BP: 103/63 (08-31-24 @ 12:51)  RR: 19 (08-31-24 @ 12:51)  SpO2: 94% (08-31-24 @ 12:51)  Wt(kg): --    08-30 @ 07:01  -  08-31 @ 07:00  --------------------------------------------------------  IN: 800 mL / OUT: 0 mL / NET: 800 mL    08-31 @ 07:01  -  08-31 @ 16:24  --------------------------------------------------------  IN: 0 mL / OUT: 475 mL / NET: -475 mL          PHYSICAL EXAM:  Constitutional: NAD  HEENT: anicteric sclera, oropharynx clear  Neck: No JVD  Respiratory: CTAB, no wheezes, rales or rhonchi  Cardiovascular: S1, S2, RRR  Gastrointestinal: BS+, soft, NT/ND  Extremities: No cyanosis or clubbing. No peripheral edema  Neurological: A/O x 3, no focal deficits  Psychiatric: Normal mood, normal affect  : No CVA tenderness. No turpin.   Skin: No rashes  Vascular Access:    LABS:  08-31    131<L>  |  96  |  73<H>  ----------------------------<  101<H>  5.2   |  15<L>  |  1.43<H>    Ca    9.7      31 Aug 2024 07:41  Phos  3.1     08-31  Mg     2.4     08-31    TPro  5.9<L>  /  Alb  2.5<L>  /  TBili  10.2<H>  /  DBili      /  AST  661<H>  /  ALT  292<H>  /  AlkPhos  810<H>  08-31    Creatinine Trend: 1.43 <--, 1.30 <--, 1.47 <--, 1.61 <--, 1.62 <--, 1.05 <--, 0.99 <--, 0.91 <--, 0.94 <--, 0.92 <--, 0.93 <--, 0.87 <--                        8.8    8.68  )-----------( 193      ( 31 Aug 2024 07:40 )             26.7     Urine Studies:  Urinalysis Basic - ( 31 Aug 2024 07:41 )    Color:  / Appearance:  / SG:  / pH:   Gluc: 101 mg/dL / Ketone:   / Bili:  / Urobili:    Blood:  / Protein:  / Nitrite:    Leuk Esterase:  / RBC:  / WBC    Sq Epi:  / Non Sq Epi:  / Bacteria:       Osmolality, Random Urine: 429 mos/kg (08-29 @ 16:14)  Chloride, Random Urine: <20 mmol/L (08-29 @ 16:13)  Creatinine, Random Urine: 144 mg/dL (08-29 @ 16:13)  Sodium, Random Urine: 8 mmol/L (08-29 @ 16:13)  Sodium, Random Urine: 32 mmol/L (08-26 @ 21:02)  Osmolality, Random Urine: 404 mos/kg (08-26 @ 21:02)      RADIOLOGY & ADDITIONAL STUDIES:   [facility] - S Roberta / Mathew S /D Elizabteh/ S Yusef/ S José Manuel/ Esteban Guzman / M Jacquie/ O Feliciano  service -[phone], office [phone]  ---------------------------------------------------------------------------------------------------------------    Patient seen and examined bedside    Subjective and Objective: No overnight events,   son bedside, reports pt suddenly felt very weak -2 episodes today  pt awake, alert, NAD  denied V/D     Allergies: No Known Allergies      Hospital Medications:   MEDICATIONS  (STANDING):  aspirin  chewable 81 milliGRAM(s) Oral daily  finasteride 5 milliGRAM(s) Oral daily  heparin   Injectable 5000 Unit(s) SubCutaneous every 8 hours  levothyroxine 50 MICROGram(s) Oral daily  lidocaine   4% Patch 1 Patch Transdermal every 24 hours  pantoprazole    Tablet 40 milliGRAM(s) Oral every 12 hours  polyethylene glycol 3350 17 Gram(s) Oral daily  senna 2 Tablet(s) Oral at bedtime  tamsulosin 0.4 milliGRAM(s) Oral at bedtime    VITALS:  T(F): 99.2 (08-31-24 @ 12:51), Max: 99.2 (08-31-24 @ 12:51)  HR: 105 (08-31-24 @ 12:51)  BP: 103/63 (08-31-24 @ 12:51)  RR: 19 (08-31-24 @ 12:51)  SpO2: 94% (08-31-24 @ 12:51)  Wt(kg): --    08-30 @ 07:01  -  08-31 @ 07:00  --------------------------------------------------------  IN: 800 mL / OUT: 0 mL / NET: 800 mL    08-31 @ 07:01  -  08-31 @ 16:24  --------------------------------------------------------  IN: 0 mL / OUT: 475 mL / NET: -475 mL    PHYSICAL EXAM:  Constitutional: NAD  HEENT: anicteric sclera  Neck: No JVD  Respiratory: CTAB, no wheezes, rales or rhonchi  Cardiovascular: S1, S2, RRR  Gastrointestinal: BS+, soft  Extremities: no pedal edema b/l   Neurological: A/O x 2  Psychiatric: Normal mood, normal affect  : No turpin.    LABS:  08-31    131<L>  |  96  |  73<H>  ----------------------------<  101<H>  5.2   |  15<L>  |  1.43<H>    Ca    9.7      31 Aug 2024 07:41  Phos  3.1     08-31  Mg     2.4     08-31    TPro  5.9<L>  /  Alb  2.5<L>  /  TBili  10.2<H>  /  DBili      /  AST  661<H>  /  ALT  292<H>  /  AlkPhos  810<H>  08-31    Creatinine Trend: 1.43 <--, 1.30 <--, 1.47 <--, 1.61 <--, 1.62 <--, 1.05 <--, 0.99 <--, 0.91 <--, 0.94 <--, 0.92 <--, 0.93 <--, 0.87 <--                        8.8    8.68  )-----------( 193      ( 31 Aug 2024 07:40 )             26.7     Urine Studies:  Urinalysis Basic - ( 31 Aug 2024 07:41 )    Color:  / Appearance:  / SG:  / pH:   Gluc: 101 mg/dL / Ketone:   / Bili:  / Urobili:    Blood:  / Protein:  / Nitrite:    Leuk Esterase:  / RBC:  / WBC    Sq Epi:  / Non Sq Epi:  / Bacteria:       Osmolality, Random Urine: 429 mos/kg (08-29 @ 16:14)  Chloride, Random Urine: <20 mmol/L (08-29 @ 16:13)  Creatinine, Random Urine: 144 mg/dL (08-29 @ 16:13)  Sodium, Random Urine: 8 mmol/L (08-29 @ 16:13)  Sodium, Random Urine: 32 mmol/L (08-26 @ 21:02)  Osmolality, Random Urine: 404 mos/kg (08-26 @ 21:02)      RADIOLOGY & ADDITIONAL STUDIES:

## 2024-08-31 NOTE — PROGRESS NOTE ADULT - PROBLEM SELECTOR PLAN 1
Biopsy proven in [facility] from endoscopy on 8/12/24.   Hospital course with Episodes of coffee ground emesis on 8/19, and dark stools 8/19-8/20, associated with increasing Tbili, transaminanses, and ALP levels.   - IR bx 8/22  - [facility] palliative - pending     Plan:   - Pantoprazole 40mg PO BID  -Onc following, appreciate recs   - Rad onc c/s palliative radiation, appreciate recs   -Palliative Care following, appreciate recs   - GI c/s multiple episodes bloody emesis, appreciate recs  - Bx results show metastatic cancer, awaiting MMR PD-1 attendum will follow

## 2024-08-31 NOTE — PROGRESS NOTE ADULT - ASSESSMENT
74 y/o M, PMH CAD w/5 stents, HTN, HLD, recent Dx of gastric adenocarcinoma, Romanian speaking, presents with burning epigastric pain associated with eating, shortness of breath on exertion x 2 weeks. He was evaluated in [facility] with endoscopy for the abdominal pain, constipation and black stools on 8/12, which showed a bleeding gastric ulcer, biopsy was taken and hemostasis achieved, Biopsy positive for gastric adenocarcinoma. CT imaging revealed metastasis to liver and adjacent lymph nodes. He elected to pursue further care here in the [place], and deferred any treatment in [facility].

## 2024-08-31 NOTE — PROGRESS NOTE ADULT - ATTENDING COMMENTS
74 y/o M, PMH CAD w/5 stents, HTN, HLD, recent Dx of gastric adenocarcinoma, Portuguese speaking, presents with burning epigastric pain associated with eating, shortness of breath on exertion x 2 weeks. He was evaluated in [facility] with endoscopy for the abdominal pain, constipation and black stools on 8/12, which showed a bleeding gastric ulcer, biopsy was taken and hemostasis achieved, Biopsy positive for gastric adenocarcinoma. CT imaging revealed metastasis to liver and adjacent lymph nodes. He elected to pursue further care here in the [place], and deferred any treatment in [facility]. s/p liver biopsy 8/20.     Patient is not a candidate for  chemotherapy  or immunotherapy, per heme-onc. spoke to son 8/31, family interested in palliative care/comfort care but will talk to rest of family and make a decision by 9/1.     hyponatremia, likely both SIADH and hypovolemic - nephrology recs appreciated - now improving to 131     Poor prognosis, explained to son

## 2024-08-31 NOTE — PROGRESS NOTE ADULT - PROBLEM SELECTOR PLAN 6
2/2 thrombophlebitis. Small 3cm circular area of redness with associated induration and TTP over dorsum of L hand, no streaking, no spreading redness, no fluctuance, Sensorimotor function of L hand intact. Reports frequent needle sticks in L hand during recent hospitalization in [facility].  -L hand U/S of soft tissue neg for abscess  -warm compresses prn

## 2024-09-01 NOTE — PROGRESS NOTE ADULT - SUBJECTIVE AND OBJECTIVE BOX
DATE OF SERVICE: 09-01-24 @ 07:38    Patient is a 73y old  Male who presents with a chief complaint of Gastric adenocarcinoma (31 Aug 2024 16:24)      SUBJECTIVE / OVERNIGHT EVENTS:  Overnight,  Pt seen and examined at bedside.    ROS negative except as above.    MEDICATIONS  (STANDING):  aspirin  chewable 81 milliGRAM(s) Oral daily  finasteride 5 milliGRAM(s) Oral daily  heparin   Injectable 5000 Unit(s) SubCutaneous every 8 hours  levothyroxine 50 MICROGram(s) Oral daily  lidocaine   4% Patch 1 Patch Transdermal every 24 hours  pantoprazole    Tablet 40 milliGRAM(s) Oral every 12 hours  polyethylene glycol 3350 17 Gram(s) Oral daily  senna 2 Tablet(s) Oral at bedtime  sodium chloride 0.45% 1000 milliLiter(s) (60 mL/Hr) IV Continuous <Continuous>  tamsulosin 0.4 milliGRAM(s) Oral at bedtime    MEDICATIONS  (PRN):  acetaminophen     Tablet .. 650 milliGRAM(s) Oral every 6 hours PRN Temp greater or equal to 38C (100.4F), Mild Pain (1 - 3)  aluminum hydroxide/magnesium hydroxide/simethicone Suspension 30 milliLiter(s) Oral every 4 hours PRN Dyspepsia  melatonin 3 milliGRAM(s) Oral at bedtime PRN Insomnia  ondansetron Injectable 4 milliGRAM(s) IV Push every 8 hours PRN Nausea and/or Vomiting  oxyCODONE    IR 5 milliGRAM(s) Oral every 4 hours PRN Moderate Pain (4 - 6)  oxyCODONE    IR 10 milliGRAM(s) Oral every 4 hours PRN Severe Pain (7 - 10)  simethicone 80 milliGRAM(s) Chew every 8 hours PRN Heartburn      Vital Signs Last 24 Hrs  T(C): 36.8 (01 Sep 2024 04:42), Max: 37.3 (31 Aug 2024 12:51)  T(F): 98.3 (01 Sep 2024 04:42), Max: 99.2 (31 Aug 2024 12:51)  HR: 88 (01 Sep 2024 04:42) (79 - 105)  BP: 115/69 (01 Sep 2024 04:42) (103/63 - 115/69)  BP(mean): --  RR: 18 (01 Sep 2024 04:42) (18 - 19)  SpO2: 97% (01 Sep 2024 04:42) (94% - 97%)    Parameters below as of 01 Sep 2024 04:42  Patient On (Oxygen Delivery Method): room air      CAPILLARY BLOOD GLUCOSE        I&O's Summary    31 Aug 2024 07:01  -  01 Sep 2024 07:00  --------------------------------------------------------  IN: 100 mL / OUT: 475 mL / NET: -375 mL        PHYSICAL EXAM:  GENERAL: NAD, well-developed  HEAD:  Atraumatic, Normocephalic  EYES: EOMI, conjunctiva and sclera clear  NECK: Supple, No JVD  CHEST/LUNG: Clear to auscultation bilaterally; No wheeze  HEART: Regular rate and rhythm; No murmurs, rubs, or gallops  ABDOMEN: Soft, Nontender, Nondistended; Bowel sounds present  EXTREMITIES:  2+ Peripheral Pulses, No clubbing, cyanosis, or edema  NEUROLOGY: AOx3, non-focal  SKIN: No rashes or lesions    LABS:                        8.7    8.80  )-----------( 179      ( 31 Aug 2024 22:19 )             26.9     08-31    131<L>  |  96  |  73<H>  ----------------------------<  101<H>  5.2   |  15<L>  |  1.43<H>    Ca    9.7      31 Aug 2024 07:41  Phos  3.1     08-31  Mg     2.4     08-31    TPro  5.9<L>  /  Alb  2.5<L>  /  TBili  10.2<H>  /  DBili  x   /  AST  661<H>  /  ALT  292<H>  /  AlkPhos  810<H>  08-31            MICRO:      RADIOLOGY & ADDITIONAL TESTS:           DATE OF SERVICE: 09-01-24 @ 07:38    Patient is a 73y old  Male who presents with a chief complaint of Gastric adenocarcinoma (31 Aug 2024 16:24)      SUBJECTIVE / OVERNIGHT EVENTS:  Overnight, pt has bloody emesis w/ clots.   Pt seen and examined at bedside.    ROS negative except as above.    MEDICATIONS  (STANDING):  aspirin  chewable 81 milliGRAM(s) Oral daily  finasteride 5 milliGRAM(s) Oral daily  heparin   Injectable 5000 Unit(s) SubCutaneous every 8 hours  levothyroxine 50 MICROGram(s) Oral daily  lidocaine   4% Patch 1 Patch Transdermal every 24 hours  pantoprazole    Tablet 40 milliGRAM(s) Oral every 12 hours  polyethylene glycol 3350 17 Gram(s) Oral daily  senna 2 Tablet(s) Oral at bedtime  sodium chloride 0.45% 1000 milliLiter(s) (60 mL/Hr) IV Continuous <Continuous>  tamsulosin 0.4 milliGRAM(s) Oral at bedtime    MEDICATIONS  (PRN):  acetaminophen     Tablet .. 650 milliGRAM(s) Oral every 6 hours PRN Temp greater or equal to 38C (100.4F), Mild Pain (1 - 3)  aluminum hydroxide/magnesium hydroxide/simethicone Suspension 30 milliLiter(s) Oral every 4 hours PRN Dyspepsia  melatonin 3 milliGRAM(s) Oral at bedtime PRN Insomnia  ondansetron Injectable 4 milliGRAM(s) IV Push every 8 hours PRN Nausea and/or Vomiting  oxyCODONE    IR 5 milliGRAM(s) Oral every 4 hours PRN Moderate Pain (4 - 6)  oxyCODONE    IR 10 milliGRAM(s) Oral every 4 hours PRN Severe Pain (7 - 10)  simethicone 80 milliGRAM(s) Chew every 8 hours PRN Heartburn      Vital Signs Last 24 Hrs  T(C): 36.8 (01 Sep 2024 04:42), Max: 37.3 (31 Aug 2024 12:51)  T(F): 98.3 (01 Sep 2024 04:42), Max: 99.2 (31 Aug 2024 12:51)  HR: 88 (01 Sep 2024 04:42) (79 - 105)  BP: 115/69 (01 Sep 2024 04:42) (103/63 - 115/69)  BP(mean): --  RR: 18 (01 Sep 2024 04:42) (18 - 19)  SpO2: 97% (01 Sep 2024 04:42) (94% - 97%)    Parameters below as of 01 Sep 2024 04:42  Patient On (Oxygen Delivery Method): room air      CAPILLARY BLOOD GLUCOSE        I&O's Summary    31 Aug 2024 07:01  -  01 Sep 2024 07:00  --------------------------------------------------------  IN: 100 mL / OUT: 475 mL / NET: -375 mL        PHYSICAL EXAM:  GENERAL: NAD, very lethargic.   HEAD:  Atraumatic, Normocephalic  EYES: EOMI, conjunctiva and sclera clear  NECK: Supple, No JVD  CHEST/LUNG: Clear to auscultation bilaterally; No wheeze  HEART: Regular rate and rhythm; No murmurs, rubs, or gallops  ABDOMEN: Soft, extremely tender to palpation of RUQ, Nondistended; Bowel sounds present  EXTREMITIES:  2+ Peripheral Pulses, No clubbing, cyanosis, or edema  NEUROLOGY: AOx3, non-focal  SKIN: No rashes or lesions    LABS:                        8.7    8.80  )-----------( 179      ( 31 Aug 2024 22:19 )             26.9     08-31    131<L>  |  96  |  73<H>  ----------------------------<  101<H>  5.2   |  15<L>  |  1.43<H>    Ca    9.7      31 Aug 2024 07:41  Phos  3.1     08-31  Mg     2.4     08-31    TPro  5.9<L>  /  Alb  2.5<L>  /  TBili  10.2<H>  /  DBili  x   /  AST  661<H>  /  ALT  292<H>  /  AlkPhos  810<H>  08-31            MICRO:      RADIOLOGY & ADDITIONAL TESTS:

## 2024-09-01 NOTE — PROGRESS NOTE ADULT - ASSESSMENT
72 y/o M, PMH CAD w/5 stents, HTN, HLD, recent Dx of gastric adenocarcinoma, German speaking, presents with burning epigastric pain associated with eating, shortness of breath on exertion x 2 weeks. He was evaluated in [facility] with endoscopy for the abdominal pain, constipation and black stools on 8/12, which showed a bleeding gastric ulcer, biopsy was taken and hemostasis achieved, Biopsy positive for gastric adenocarcinoma. CT imaging revealed metastasis to liver and adjacent lymph nodes. He elected to pursue further care here in the [place], and deferred any treatment in [facility].

## 2024-09-01 NOTE — PROGRESS NOTE ADULT - ASSESSMENT
73 year old male with PMH of CAD w/5 stents, HTN, HLD, recent Dx of gastric adenocarcinoma, German speaking, presents with burning epigastric pain associated with eating x 2 days, shortness of breath on exertion x 2 weeks. Hospital course thus farhas been complicated by coffee ground emesis and abnormal liver enzymes. Pt underwent for IR guided biopsy of liver mets 8/22. The nephrology team was consulted for hyponatremia.    Hyponatremia   likely in setting of poor solute intake and SIADH  abdominal pain stimulating ADH  urine lytes reviewed; consistent with same   SNa improving 129>134  slightly increased SCR to 1.54  plan   s/p HTS with 2% at 50cc/hour for 12 hours yesterday   encourage PO intake; protein shakes with meals   maintain free water limit  avoid hypotonic solutions   monitor serum sodium daily    LUKE   likely from volume depletion   Creatinine Trend: 1.54 1.43 <--, 1.30 <--, 1.47 <--, 1.61 <--, 1.62 <--, 1.05 <--, 0.99 <--, 0.91 <--, 0.94 <--, 0.92 <--, 0.93 <--, 0.87 <--  urine studies reviewed   please check for urinary retention  Monitor labs and urine output. Avoid nephrotoxins. Dose medications as per eGFR.    Hyperkalemia -K better  2/2 LUKE   maintain a low K diet     Dr Dhillon   [phone]
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
## 2024-09-01 NOTE — PROGRESS NOTE ADULT - ATTENDING COMMENTS
74 y/o M, PMH CAD w/5 stents, HTN, HLD, recent Dx of gastric adenocarcinoma, Indonesian speaking, presents with burning epigastric pain associated with eating, shortness of breath on exertion x 2 weeks. He was evaluated in [facility] with endoscopy for the abdominal pain, constipation and black stools on 8/12, which showed a bleeding gastric ulcer, biopsy was taken and hemostasis achieved, Biopsy positive for gastric adenocarcinoma. CT imaging revealed metastasis to liver and adjacent lymph nodes. He elected to pursue further care here in the [place], and deferred any treatment in [facility]. s/p liver biopsy 8/20.     Patient is not a candidate for  chemotherapy  or immunotherapy, per heme-onc. Team spoke with son and family interested in palliative care/comfort care but will talk to rest of family and let primary team know. Hgb downtrended, likely intermittent GIB. No transfusions needed at this time. Not candidate for radiation pallative. Reconsult GI if patient develops overt signs of GI with downdtrending hgb requiring transfusions. No current hemtemesesis at this time.     Prognosis poor. d/w son at bedside    50 minutes spent.  Erica Ch MD  Division of Hospital Medicine  Available on [employer] Teams

## 2024-09-01 NOTE — PROGRESS NOTE ADULT - SUBJECTIVE AND OBJECTIVE BOX
[facility] : Progress Note :: [phone], (office [phone]),   Dr Dhillon / Dr Guzman / Dr Georges / Dr Carmona / Dr Yusef CANALES / Dr Johnson / Dr Granados / Dr Ray floyd  _____________________________________________________________________________________________    poor oral intake    No Known Allergies    Hospital Medications:   MEDICATIONS  (STANDING):  aspirin  chewable 81 milliGRAM(s) Oral daily  finasteride 5 milliGRAM(s) Oral daily  heparin   Injectable 5000 Unit(s) SubCutaneous every 8 hours  levothyroxine 50 MICROGram(s) Oral daily  lidocaine   4% Patch 1 Patch Transdermal every 24 hours  pantoprazole    Tablet 40 milliGRAM(s) Oral every 12 hours  polyethylene glycol 3350 17 Gram(s) Oral daily  senna 2 Tablet(s) Oral at bedtime  sodium chloride 0.45% 1000 milliLiter(s) (60 mL/Hr) IV Continuous <Continuous>  tamsulosin 0.4 milliGRAM(s) Oral at bedtime        VITALS:  T(F): 98.2 (09-01-24 @ 12:04), Max: 98.3 (09-01-24 @ 04:42)  HR: 94 (09-01-24 @ 12:04)  BP: 116/65 (09-01-24 @ 12:04)  RR: 18 (09-01-24 @ 12:04)  SpO2: 95% (09-01-24 @ 12:04)  Wt(kg): --    08-31 @ 07:01  -  09-01 @ 07:00  --------------------------------------------------------  IN: 100 mL / OUT: 475 mL / NET: -375 mL        PHYSICAL EXAM:  Constitutional: NAD  HEENT: anicteric sclera, oropharynx clear.  Neck: No JVD  Respiratory: CTAB, no wheezes, rales or rhonchi  Cardiovascular: S1, S2, RRR  Gastrointestinal: BS+, soft, NT/ND  Extremities:  No peripheral edema  Neurological: A/O x 3, no focal deficits.  : No CVA tenderness. No turpin.       LABS:  09-01    134<L>  |  95<L>  |  86<H>  ----------------------------<  94  5.5<H>   |  14<L>  |  1.54<H>    Ca    10.3      01 Sep 2024 07:22  Phos  3.2     09-01  Mg     2.6     09-01    TPro  5.9<L>  /  Alb  2.5<L>  /  TBili  10.2<H>  /  DBili      /  AST  661<H>  /  ALT  292<H>  /  AlkPhos  810<H>  08-31    Creatinine Trend: 1.54 <--, 1.43 <--, 1.30 <--, 1.47 <--, 1.61 <--, 1.62 <--, 1.05 <--, 0.99 <--, 0.91 <--, 0.94 <--, 0.92 <--, 0.93 <--                        8.5    10.28 )-----------( 191      ( 01 Sep 2024 07:22 )             26.5     Urine Studies:  Urinalysis Basic - ( 01 Sep 2024 07:22 )    Color:  / Appearance:  / SG:  / pH:   Gluc: 94 mg/dL / Ketone:   / Bili:  / Urobili:    Blood:  / Protein:  / Nitrite:    Leuk Esterase:  / RBC:  / WBC    Sq Epi:  / Non Sq Epi:  / Bacteria:       Osmolality, Random Urine: 429 mos/kg (08-29 @ 16:14)  Chloride, Random Urine: <20 mmol/L (08-29 @ 16:13)  Creatinine, Random Urine: 144 mg/dL (08-29 @ 16:13)  Sodium, Random Urine: 8 mmol/L (08-29 @ 16:13)  Sodium, Random Urine: 32 mmol/L (08-26 @ 21:02)  Osmolality, Random Urine: 404 mos/kg (08-26 @ 21:02)    RADIOLOGY & ADDITIONAL STUDIES:

## 2024-09-01 NOTE — PROGRESS NOTE ADULT - PROBLEM SELECTOR PLAN 6
2/2 thrombophlebitis. Small 3cm circular area of redness with associated induration and TTP over dorsum of L hand, no streaking, no spreading redness, no fluctuance, Sensorimotor function of L hand intact. Reports frequent needle sticks in L hand during recent hospitalization in [facility].  -L hand U/S of soft tissue neg for abscess  -warm compresses prn

## 2024-09-01 NOTE — PROGRESS NOTE ADULT - PROBLEM SELECTOR PLAN 1
Biopsy proven in [facility] from endoscopy on 8/12/24.   Hospital course with Episodes of coffee ground emesis on 8/19, and dark stools 8/19-8/20, associated with increasing Tbili, transaminanses, and ALP levels.   - IR bx 8/22  - [facility] palliative - pending     Plan:   - Pantoprazole 40mg PO BID  -Onc following, appreciate recs   - Rad onc c/s palliative radiation, appreciate recs   -Palliative Care following, appreciate recs   - GI c/s multiple episodes bloody emesis, appreciate recs  - Bx results show metastatic cancer, awaiting MMR PD-1 attendum will follow

## 2024-09-02 NOTE — PROGRESS NOTE ADULT - SUBJECTIVE AND OBJECTIVE BOX
[facility] : Ans Serv [phone], Office [phone]  Dr. Gaming/Dr Johnson/Dr Granados  /Dr Ac floyd /Dr ALLY Holbrook/Dr Elvin Georges/Dr Esteban Guzman /Dr CONSUELO Sheffieldu  _______________________________________________________________________________________________    Pt seen and examined this morning  poor appetite and continues ot have N/V   dark stools overnight     VITALS:  T(F): 98.2 (09-02 @ 08:01), Max: 99.2 (08-31 @ 12:51)  HR: 96 (09-02 @ 08:01)  BP: 109/64 (09-02 @ 08:01)  ABP: --  RR: 19 (09-02 @ 08:01)  SpO2: 98% (09-02 @ 08:01)    Physical Exam :-  Constitutional: ill appearing, frail  HEENT: anicteric sclera, oropharynx clear, MMM  Neck: supple.   Respiratory: Bilateral equal breath sounds , no wheezes, no crackles  Cardiovascular: S1, S2, Regular  Gastrointestinal: tender to palpation  Extremities: No peripheral edema  Neurological: awake and alert    Data:-  Allergies :   No Known Allergies    Hospital Medications:   MEDICATIONS  (STANDING):  aspirin  chewable 81 milliGRAM(s) Oral daily  finasteride 5 milliGRAM(s) Oral daily  heparin   Injectable 5000 Unit(s) SubCutaneous every 8 hours  levothyroxine 50 MICROGram(s) Oral daily  lidocaine   4% Patch 1 Patch Transdermal every 24 hours  pantoprazole    Tablet 40 milliGRAM(s) Oral every 12 hours  polyethylene glycol 3350 17 Gram(s) Oral daily  senna 2 Tablet(s) Oral at bedtime  tamsulosin 0.4 milliGRAM(s) Oral at bedtime    09-02    137  |  99  |  80<H>  ----------------------------<  88  5.2   |  15<L>  |  1.14    Ca    10.3      02 Sep 2024 07:13  Phos  2.6     09-02  Mg     2.5     09-02      Creatinine Trend: 1.14 <--, 1.54 <--, 1.43 <--, 1.30 <--, 1.47 <--, 1.61 <--  egfr trend : 67 <--, 47 <--, 51 <--, 57 <--, 49 <--, 44 <--, 44 <--                        8.6    10.34 )-----------( 174      ( 02 Sep 2024 07:16 )             26.7

## 2024-09-02 NOTE — PROGRESS NOTE ADULT - SUBJECTIVE AND OBJECTIVE BOX
DATE OF SERVICE: 09-02-24 @ 08:16    Patient is a 73y old  Male who presents with a chief complaint of Gastric adenocarcinoma (01 Sep 2024 14:32)      SUBJECTIVE / OVERNIGHT EVENTS:  Overnight,  Pt seen and examined at bedside.    ROS negative except as above.    MEDICATIONS  (STANDING):  aspirin  chewable 81 milliGRAM(s) Oral daily  finasteride 5 milliGRAM(s) Oral daily  heparin   Injectable 5000 Unit(s) SubCutaneous every 8 hours  levothyroxine 50 MICROGram(s) Oral daily  lidocaine   4% Patch 1 Patch Transdermal every 24 hours  pantoprazole    Tablet 40 milliGRAM(s) Oral every 12 hours  polyethylene glycol 3350 17 Gram(s) Oral daily  senna 2 Tablet(s) Oral at bedtime  sodium chloride 0.45% 1000 milliLiter(s) (60 mL/Hr) IV Continuous <Continuous>  tamsulosin 0.4 milliGRAM(s) Oral at bedtime    MEDICATIONS  (PRN):  acetaminophen     Tablet .. 650 milliGRAM(s) Oral every 6 hours PRN Temp greater or equal to 38C (100.4F), Mild Pain (1 - 3)  aluminum hydroxide/magnesium hydroxide/simethicone Suspension 30 milliLiter(s) Oral every 4 hours PRN Dyspepsia  melatonin 3 milliGRAM(s) Oral at bedtime PRN Insomnia  ondansetron Injectable 4 milliGRAM(s) IV Push every 8 hours PRN Nausea and/or Vomiting  oxyCODONE    IR 5 milliGRAM(s) Oral every 4 hours PRN Moderate Pain (4 - 6)  oxyCODONE    IR 10 milliGRAM(s) Oral every 4 hours PRN Severe Pain (7 - 10)  simethicone 80 milliGRAM(s) Chew every 8 hours PRN Heartburn      Vital Signs Last 24 Hrs  T(C): 36.8 (02 Sep 2024 08:01), Max: 36.9 (02 Sep 2024 05:05)  T(F): 98.2 (02 Sep 2024 08:01), Max: 98.4 (02 Sep 2024 05:05)  HR: 96 (02 Sep 2024 08:01) (88 - 96)  BP: 109/64 (02 Sep 2024 08:01) (109/64 - 129/75)  BP(mean): --  RR: 19 (02 Sep 2024 08:01) (18 - 20)  SpO2: 98% (02 Sep 2024 08:01) (95% - 98%)    Parameters below as of 02 Sep 2024 08:01  Patient On (Oxygen Delivery Method): room air      CAPILLARY BLOOD GLUCOSE        I&O's Summary      PHYSICAL EXAM:  GENERAL: NAD, well-developed  HEAD:  Atraumatic, Normocephalic  EYES: EOMI, conjunctiva and sclera clear  NECK: Supple, No JVD  CHEST/LUNG: Clear to auscultation bilaterally; No wheeze  HEART: Regular rate and rhythm; No murmurs, rubs, or gallops  ABDOMEN: Soft, Nontender, Nondistended; Bowel sounds present  EXTREMITIES:  2+ Peripheral Pulses, No clubbing, cyanosis, or edema  NEUROLOGY: AOx3, non-focal  SKIN: No rashes or lesions    LABS:                        8.6    10.34 )-----------( 174      ( 02 Sep 2024 07:16 )             26.7     09-02    137  |  99  |  80<H>  ----------------------------<  88  5.2   |  15<L>  |  1.14    Ca    10.3      02 Sep 2024 07:13  Phos  2.6     09-02  Mg     2.5     09-02              MICRO:      RADIOLOGY & ADDITIONAL TESTS:           DATE OF SERVICE: 09-02-24 @ 08:16    Patient is a 73y old  Male who presents with a chief complaint of Gastric adenocarcinoma (01 Sep 2024 14:32)      SUBJECTIVE / OVERNIGHT EVENTS:  Overnight, mike.   Pt seen and examined at bedside.    ROS negative except as above.    MEDICATIONS  (STANDING):  aspirin  chewable 81 milliGRAM(s) Oral daily  finasteride 5 milliGRAM(s) Oral daily  heparin   Injectable 5000 Unit(s) SubCutaneous every 8 hours  levothyroxine 50 MICROGram(s) Oral daily  lidocaine   4% Patch 1 Patch Transdermal every 24 hours  pantoprazole    Tablet 40 milliGRAM(s) Oral every 12 hours  polyethylene glycol 3350 17 Gram(s) Oral daily  senna 2 Tablet(s) Oral at bedtime  sodium chloride 0.45% 1000 milliLiter(s) (60 mL/Hr) IV Continuous <Continuous>  tamsulosin 0.4 milliGRAM(s) Oral at bedtime    MEDICATIONS  (PRN):  acetaminophen     Tablet .. 650 milliGRAM(s) Oral every 6 hours PRN Temp greater or equal to 38C (100.4F), Mild Pain (1 - 3)  aluminum hydroxide/magnesium hydroxide/simethicone Suspension 30 milliLiter(s) Oral every 4 hours PRN Dyspepsia  melatonin 3 milliGRAM(s) Oral at bedtime PRN Insomnia  ondansetron Injectable 4 milliGRAM(s) IV Push every 8 hours PRN Nausea and/or Vomiting  oxyCODONE    IR 5 milliGRAM(s) Oral every 4 hours PRN Moderate Pain (4 - 6)  oxyCODONE    IR 10 milliGRAM(s) Oral every 4 hours PRN Severe Pain (7 - 10)  simethicone 80 milliGRAM(s) Chew every 8 hours PRN Heartburn      Vital Signs Last 24 Hrs  T(C): 36.8 (02 Sep 2024 08:01), Max: 36.9 (02 Sep 2024 05:05)  T(F): 98.2 (02 Sep 2024 08:01), Max: 98.4 (02 Sep 2024 05:05)  HR: 96 (02 Sep 2024 08:01) (88 - 96)  BP: 109/64 (02 Sep 2024 08:01) (109/64 - 129/75)  BP(mean): --  RR: 19 (02 Sep 2024 08:01) (18 - 20)  SpO2: 98% (02 Sep 2024 08:01) (95% - 98%)    Parameters below as of 02 Sep 2024 08:01  Patient On (Oxygen Delivery Method): room air      CAPILLARY BLOOD GLUCOSE        I&O's Summary      PHYSICAL EXAM:  GENERAL: NAD, lethargic, deconditioned   HEAD:  Atraumatic, Normocephalic  EYES: EOMI, conjunctiva and sclera clear  NECK: Supple, No JVD  CHEST/LUNG: Clear to auscultation bilaterally; No wheeze  HEART: Regular rate and rhythm; No murmurs, rubs, or gallops  ABDOMEN: Soft, tender to deep palpation, Nondistended; Bowel sounds present  EXTREMITIES:  2+ Peripheral Pulses, No clubbing, cyanosis, or edema  NEUROLOGY: AOx3, non-focal  SKIN: No rashes or lesions    LABS:                        8.6    10.34 )-----------( 174      ( 02 Sep 2024 07:16 )             26.7     09-02    137  |  99  |  80<H>  ----------------------------<  88  5.2   |  15<L>  |  1.14    Ca    10.3      02 Sep 2024 07:13  Phos  2.6     09-02  Mg     2.5     09-02              MICRO:      RADIOLOGY & ADDITIONAL TESTS:

## 2024-09-02 NOTE — PROGRESS NOTE ADULT - ATTENDING COMMENTS
74 y/o M, PMH CAD w/5 stents, HTN, HLD, recent Dx of gastric adenocarcinoma, Icelandic speaking, presents with burning epigastric pain associated with eating, shortness of breath on exertion x 2 weeks. He was evaluated in [facility] with endoscopy for the abdominal pain, constipation and black stools on 8/12, which showed a bleeding gastric ulcer, biopsy was taken and hemostasis achieved, Biopsy positive for gastric adenocarcinoma. CT imaging revealed metastasis to liver and adjacent lymph nodes. He elected to pursue further care here in the [place], and deferred any treatment in [facility]. s/p liver biopsy 8/20.     Patient is not a candidate for  chemotherapy  or immunotherapy, per heme-onc. Team spoke with son and family interested in palliative care/comfort care but will talk to rest of family and let primary team know. Hgb downtrended with intermittent ep of hemetemsis and melena. Not candidate for pallative radiation at this time. No role of endoscopy unless profusly bleeding as any intervention to stop bleed by GI will likely reoccur    Prognosis poor. d/w son at bedside    30 minutes spent.  Erica Ch MD  Division of Hospital Medicine  Available on [employer] Teams.

## 2024-09-02 NOTE — PROGRESS NOTE ADULT - PROBLEM SELECTOR PLAN 1
Biopsy proven in [facility] from endoscopy on 8/12/24.   Hospital course with Episodes of coffee ground emesis on 8/19, and dark stools 8/19-8/20, associated with increasing Tbili, transaminanses, and ALP levels.   - IR bx 8/22  - [facility] palliative - pending     Plan:   - Pantoprazole 40mg PO BID  -Onc following, appreciate recs   - Rad onc c/s palliative radiation, appreciate recs   -Palliative Care following, appreciate recs   - GI c/s multiple episodes bloody emesis, appreciate recs  - Bx results show metastatic cancer, awaiting MMR PD-1 attendum will follow Biopsy proven in [facility] from endoscopy on 8/12/24.   Hospital course with Episodes of coffee ground emesis on 8/19, and dark stools 8/19-8/20, associated with increasing Tbili, transaminanses, and ALP levels.   - IR bx 8/22 proving mets to liver   - [facility]OC palliative - pending     Plan:   - Pantoprazole 40mg PO BID  -Onc following, appreciate recs   - Rad onc re-consult, pending recs 9/2  -Palliative Care following, appreciate recs   - GI c/s multiple episodes bloody emesis, appreciate recs  - Bx results show metastatic cancer, awaiting MMR PD-1 attendum will follow

## 2024-09-02 NOTE — PROGRESS NOTE ADULT - PROBLEM SELECTOR PLAN 2
Hyponatremic 127/128 since admit. 8/27 124. 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      Plan:  - Fluid restricted halal diet   - Hypertonic 2% NaCl x 12 hours trialed 8/31  - Trend BMP q8 per nephro till na > 130  - Start urea tabs, per nephro   - Asymptomatic, persistent poor pO intake  - Nephro following, appreciate recs Hyponatremic 127/128 since admit. 8/27 124. 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     Much improved. 130s now.      Plan:  - Fluid restricted halal diet   - S/p Hypertonic 2% NaCl x 12 hours 8/31  - Trend sodium on BMPs  - Sodium bicarb 650 BID PO per nephro   - Nephro following, appreciate recs   - Asymptomatic, persistent poor pO intake  - Nephro following, appreciate recs

## 2024-09-02 NOTE — PROGRESS NOTE ADULT - ASSESSMENT
73 year old male with PMH of CAD w/5 stents, HTN, HLD, recent Dx of gastric adenocarcinoma, Welsh speaking, presents with burning epigastric pain associated with eating x 2 days, shortness of breath on exertion x 2 weeks. Hospital course thus farhas been complicated by coffee ground emesis and abnormal liver enzymes. Pt underwent for IR guided biopsy of liver mets 8/22. The nephrology team was consulted for hyponatremia.    Hyponatremia   likely in setting of poor solute intake and SIADH  abdominal pain stimulating ADH  urine lytes reviewed; consistent with same   SNa improving 129>134>137    plan   s/p HTS with 2% at 50cc/hour for 12 hours 8/30  has been on 1/2NS with 75mEq sodium bicarbonate   serum CO2 remains below goal   consider starting sodium bicarbonate 1300mg BID  encourage PO intake; protein shakes with meals   maintain free water limit  avoid hypotonic solutions   monitor serum sodium daily    LUKE   likely from volume depletion   Creatinine Trend: 1.14 <--, 1.54 <--, 1.43 <--, 1.30 <--, 1.47 <--, 1.61 <--  urine studies reviewed   please monitor for urinary retention  Monitor labs and urine output. Avoid nephrotoxins. Dose medications as per eGFR.    Hyperkalemia -K better  2/2 LUKE   maintain a low K diet   bicarb therapy will also help    If any questions, please feel free to contact me     Dandre Gaming  Nephrology Attending  Cell #[phone]

## 2024-09-02 NOTE — PROGRESS NOTE ADULT - ASSESSMENT
74 y/o M, PMH CAD w/5 stents, HTN, HLD, recent Dx of gastric adenocarcinoma, Upper sorbian speaking, presents with burning epigastric pain associated with eating, shortness of breath on exertion x 2 weeks. He was evaluated in [facility] with endoscopy for the abdominal pain, constipation and black stools on 8/12, which showed a bleeding gastric ulcer, biopsy was taken and hemostasis achieved, Biopsy positive for gastric adenocarcinoma. CT imaging revealed metastasis to liver and adjacent lymph nodes. He elected to pursue further care here in the [place], and deferred any treatment in [facility].

## 2024-09-03 NOTE — PROGRESS NOTE ADULT - SUBJECTIVE AND OBJECTIVE BOX
[facility] : Ans Serv [phone], Office [phone]  Dr. Gaming/Dr Johnson/Dr Granados  /Dr Ac floyd /Dr ALLY Holbrook/Dr Elvin Georges/Dr Esteban Guzman /Dr CONSUELO Dhillon  _______________________________________________________________________________________________    Pt seen and examined this morning   no eating well   no acute issues noted overnight     VITALS:  T(F): 98 (09-03 @ 04:10), Max: 98.4 (09-02 @ 05:05)  HR: 96 (09-03 @ 04:10)  BP: 106/65 (09-03 @ 04:10)  ABP: --  RR: 18 (09-03 @ 04:10)  SpO2: 98% (09-03 @ 04:10)    Physical Exam :-  Constitutional: ill appearing, frail  HEENT: anicteric sclera, oropharynx clear, MMM  Neck: supple.   Respiratory: Bilateral equal breath sounds , no wheezes, no crackles  Cardiovascular: S1, S2, Regular  Gastrointestinal: tender to palpation  Extremities: No peripheral edema  Neurological: awake and alert    Data:-  Allergies :   No Known Allergies    Hospital Medications:   MEDICATIONS  (STANDING):  aspirin  chewable 81 milliGRAM(s) Oral daily  finasteride 5 milliGRAM(s) Oral daily  heparin   Injectable 5000 Unit(s) SubCutaneous every 8 hours  levothyroxine 50 MICROGram(s) Oral daily  lidocaine   4% Patch 1 Patch Transdermal every 24 hours  pantoprazole    Tablet 40 milliGRAM(s) Oral every 12 hours  sodium bicarbonate 650 milliGRAM(s) Oral every 12 hours  tamsulosin 0.4 milliGRAM(s) Oral at bedtime    09-03    143  |  102  |  89<H>  ----------------------------<  108<H>  5.3   |  13<L>  |  1.33<H>    Ca    10.7<H>      03 Sep 2024 07:27  Phos  2.9     09-03  Mg     2.7     09-03    TPro  5.5<L>  /  Alb  2.2<L>  /  TBili  9.5<H>  /  DBili      /  AST  402<H>  /  ALT  215<H>  /  AlkPhos  569<H>  09-03    Creatinine Trend: 1.33 <--, 1.14 <--, 1.54 <--, 1.43 <--, 1.30 <--, 1.47 <--  egfr trend : 56 <--, 67 <--, 47 <--, 51 <--, 57 <--, 49 <--, 44 <--                        7.8    10.87 )-----------( 160      ( 03 Sep 2024 07:27 )             24.6

## 2024-09-03 NOTE — PROGRESS NOTE ADULT - PROBLEM SELECTOR PLAN 6
2/2 thrombophlebitis. Small 3cm circular area of redness with associated induration and TTP over dorsum of L hand, no streaking, no spreading redness, no fluctuance, Sensorimotor function of L hand intact. Reports frequent needle sticks in L hand during recent hospitalization in [facility].  -L hand U/S of soft tissue neg for abscess  -warm compresses prn

## 2024-09-03 NOTE — CHART NOTE - NSCHARTNOTEFT_GEN_A_CORE
JUANITA continues to follow this case. JUANITA received call from patient's son today JUANITA continues to follow this case. JUANITA received call from patient's son today requesting further information regarding hospice services.    LCSW returned son Nisha's call this afternoon and first introduced self and role. Nisha verbalized understanding and was receptive to call back today. Nisha notes being informed that hospice services are being recommended for patient given overall clinical status, and Nisha notes wishing to learn more about this service. LCSW provided detailed overview of home hospice services today, noting that as per chart review, patient appears to qualify for home level of care but does not presently qualify for inpatient hospice as patient with all meds ordered PO and without significant symptom burden identified. Nisha inquired into facility placement with hospice, and LCSW detailed the process of d/c planning to [facility] with conversion of patient's medicaid from community to facility and future introduction of hospice services once medicaid is converted as hospice requires two payor sources. LCSW discussed that patient cannot be immediately placed on hospice services until insurance is converted and notes high risk of hospital readmission should any needs arise whilst patient is in [facility] prior to LTC conversion and hospice initiation. Nisha verbalized understanding and notes that he wishes to speak more about this plan of care with his family prior to beginning referrals to hospice or to [facility], but is agreeable to receiving [facility] list for review and consideration at this time. RAFAEL informed for follow up.    JUANITA informed that FM arranged for tomorrow, 9/4, at 4 PM. JUANITA will continue to follow this case for ongoing GOC discussion and ACP as well.

## 2024-09-03 NOTE — PROGRESS NOTE ADULT - SUBJECTIVE AND OBJECTIVE BOX
DATE OF SERVICE: 09-03-24 @ 07:41    Patient is a 73y old  Male who presents with a chief complaint of Gastric adenocarcinoma (02 Sep 2024 09:08)      SUBJECTIVE / OVERNIGHT EVENTS:  Overnight,  Pt seen and examined at bedside.    ROS negative except as above.    MEDICATIONS  (STANDING):  aspirin  chewable 81 milliGRAM(s) Oral daily  finasteride 5 milliGRAM(s) Oral daily  heparin   Injectable 5000 Unit(s) SubCutaneous every 8 hours  levothyroxine 50 MICROGram(s) Oral daily  lidocaine   4% Patch 1 Patch Transdermal every 24 hours  pantoprazole    Tablet 40 milliGRAM(s) Oral every 12 hours  sodium bicarbonate 650 milliGRAM(s) Oral every 12 hours  tamsulosin 0.4 milliGRAM(s) Oral at bedtime    MEDICATIONS  (PRN):  acetaminophen     Tablet .. 650 milliGRAM(s) Oral every 6 hours PRN Temp greater or equal to 38C (100.4F), Mild Pain (1 - 3)  aluminum hydroxide/magnesium hydroxide/simethicone Suspension 30 milliLiter(s) Oral every 4 hours PRN Dyspepsia  melatonin 3 milliGRAM(s) Oral at bedtime PRN Insomnia  ondansetron Injectable 4 milliGRAM(s) IV Push every 8 hours PRN Nausea and/or Vomiting  polyethylene glycol 3350 17 Gram(s) Oral daily PRN Constipation  senna 2 Tablet(s) Oral at bedtime PRN Constipation  simethicone 80 milliGRAM(s) Chew every 8 hours PRN Heartburn      Vital Signs Last 24 Hrs  T(C): 36.7 (03 Sep 2024 04:10), Max: 36.8 (02 Sep 2024 08:01)  T(F): 98 (03 Sep 2024 04:10), Max: 98.2 (02 Sep 2024 08:01)  HR: 96 (03 Sep 2024 04:10) (96 - 96)  BP: 106/65 (03 Sep 2024 04:10) (106/65 - 133/75)  BP(mean): --  RR: 18 (03 Sep 2024 04:10) (18 - 19)  SpO2: 98% (03 Sep 2024 04:10) (96% - 98%)    Parameters below as of 03 Sep 2024 04:10  Patient On (Oxygen Delivery Method): room air      CAPILLARY BLOOD GLUCOSE        I&O's Summary      PHYSICAL EXAM:  GENERAL: NAD, well-developed  HEAD:  Atraumatic, Normocephalic  EYES: EOMI, conjunctiva and sclera clear  NECK: Supple, No JVD  CHEST/LUNG: Clear to auscultation bilaterally; No wheeze  HEART: Regular rate and rhythm; No murmurs, rubs, or gallops  ABDOMEN: Soft, Nontender, Nondistended; Bowel sounds present  EXTREMITIES:  2+ Peripheral Pulses, No clubbing, cyanosis, or edema  NEUROLOGY: AOx3, non-focal  SKIN: No rashes or lesions    LABS:                        8.6    10.34 )-----------( 174      ( 02 Sep 2024 07:16 )             26.7     09-02    137  |  99  |  80<H>  ----------------------------<  88  5.2   |  15<L>  |  1.14    Ca    10.3      02 Sep 2024 07:13  Phos  2.6     09-02  Mg     2.5     09-02              MICRO:      RADIOLOGY & ADDITIONAL TESTS:           DATE OF SERVICE: 09-03-24 @ 07:41    Patient is a 73y old  Male who presents with a chief complaint of Gastric adenocarcinoma (02 Sep 2024 09:08)      SUBJECTIVE / OVERNIGHT EVENTS:  Overnight, occasional bloody emesis.   Pt seen and examined at bedside. Incredibly lethargic. Deconditioned.     ROS negative except as above.    MEDICATIONS  (STANDING):  aspirin  chewable 81 milliGRAM(s) Oral daily  finasteride 5 milliGRAM(s) Oral daily  heparin   Injectable 5000 Unit(s) SubCutaneous every 8 hours  levothyroxine 50 MICROGram(s) Oral daily  lidocaine   4% Patch 1 Patch Transdermal every 24 hours  pantoprazole    Tablet 40 milliGRAM(s) Oral every 12 hours  sodium bicarbonate 650 milliGRAM(s) Oral every 12 hours  tamsulosin 0.4 milliGRAM(s) Oral at bedtime    MEDICATIONS  (PRN):  acetaminophen     Tablet .. 650 milliGRAM(s) Oral every 6 hours PRN Temp greater or equal to 38C (100.4F), Mild Pain (1 - 3)  aluminum hydroxide/magnesium hydroxide/simethicone Suspension 30 milliLiter(s) Oral every 4 hours PRN Dyspepsia  melatonin 3 milliGRAM(s) Oral at bedtime PRN Insomnia  ondansetron Injectable 4 milliGRAM(s) IV Push every 8 hours PRN Nausea and/or Vomiting  polyethylene glycol 3350 17 Gram(s) Oral daily PRN Constipation  senna 2 Tablet(s) Oral at bedtime PRN Constipation  simethicone 80 milliGRAM(s) Chew every 8 hours PRN Heartburn      Vital Signs Last 24 Hrs  T(C): 36.7 (03 Sep 2024 04:10), Max: 36.8 (02 Sep 2024 08:01)  T(F): 98 (03 Sep 2024 04:10), Max: 98.2 (02 Sep 2024 08:01)  HR: 96 (03 Sep 2024 04:10) (96 - 96)  BP: 106/65 (03 Sep 2024 04:10) (106/65 - 133/75)  BP(mean): --  RR: 18 (03 Sep 2024 04:10) (18 - 19)  SpO2: 98% (03 Sep 2024 04:10) (96% - 98%)    Parameters below as of 03 Sep 2024 04:10  Patient On (Oxygen Delivery Method): room air      CAPILLARY BLOOD GLUCOSE        I&O's Summary      PHYSICAL EXAM:  GENERAL: NAD, lethargic, cachectic, lethargic   HEAD:  Atraumatic, Normocephalic  EYES: EOMI, conjunctiva and sclera clear  NECK: Supple, No JVD  CHEST/LUNG: Clear to auscultation bilaterally; No wheeze  HEART: Regular rate and rhythm; No murmurs, rubs, or gallops  ABDOMEN: Soft, Nontender, Nondistended; Bowel sounds present  EXTREMITIES:  2+ Peripheral Pulses, No clubbing, cyanosis, or edema  NEUROLOGY: AOx2-3, non-focal  SKIN: No rashes or lesions    LABS:                        8.6    10.34 )-----------( 174      ( 02 Sep 2024 07:16 )             26.7     09-02    137  |  99  |  80<H>  ----------------------------<  88  5.2   |  15<L>  |  1.14    Ca    10.3      02 Sep 2024 07:13  Phos  2.6     09-02  Mg     2.5     09-02              MICRO:      RADIOLOGY & ADDITIONAL TESTS:

## 2024-09-03 NOTE — PROGRESS NOTE ADULT - ASSESSMENT
73 year old male with PMH of CAD w/5 stents, HTN, HLD, recent Dx of gastric adenocarcinoma, Telugu speaking, presents with burning epigastric pain associated with eating x 2 days, shortness of breath on exertion x 2 weeks. Hospital course thus farhas been complicated by coffee ground emesis and abnormal liver enzymes. Pt underwent for IR guided biopsy of liver mets 8/22. The nephrology team was consulted for hyponatremia.    Hyponatremia   likely in setting of poor solute intake and SIADH  abdominal pain stimulating ADH  urine lytes reviewed; consistent with same   SNa improving 129>134>137>143  --- suspect he may be becoming dehydrated     plan   s/p HTS with 2% at 50cc/hour for 12 hours 8/30  serum CO2 remains below goal   increase to sodium bicarbonate 1300mg BID  encourage PO intake; protein shakes with meals   now okay to liberalize free water intake  monitor serum sodium daily    LUKE   likely from volume depletion   Creatinine Trend: 1.33 <--, 1.14 <--, 1.54 <--, 1.43 <--, 1.30 <--, 1.47 <--  urine studies reviewed   please monitor for urinary retention  Monitor labs and urine output. Avoid nephrotoxins. Dose medications as per eGFR.    Hyperkalemia -K better  2/2 LUKE   maintain a low K diet   bicarb therapy will also help    If any questions, please feel free to contact me     Dandre Gaming  Nephrology Attending  Cell #[phone]

## 2024-09-03 NOTE — PROGRESS NOTE ADULT - TIME BILLING
- Ordering, reviewing, and interpreting labs, testing, and imaging.  - Independently obtaining a review of systems and performing a physical exam  - Reviewing consultant documentation/recommendations in addition to discussing plan of care with consultants.  - Counselling and educating patient and family regarding interpretation of aforementioned items and plan of care.  - time spent does not include teaching

## 2024-09-03 NOTE — PROGRESS NOTE ADULT - PROBLEM SELECTOR PLAN 1
Biopsy proven in [facility] from endoscopy on 8/12/24.   Hospital course with Episodes of coffee ground emesis on 8/19, and dark stools 8/19-8/20, associated with increasing Tbili, transaminanses, and ALP levels.   - IR bx 8/22 proving mets to liver   - GOC palliative - pending     Plan:   - Pantoprazole 40mg PO BID  -Onc following, appreciate recs   - Rad onc re-consult, pending recs 9/2  -Palliative Care following, appreciate recs   - GI c/s multiple episodes bloody emesis, appreciate recs  - Bx results show metastatic cancer, awaiting MMR PD-1 attendum will follow Biopsy proven in [facility] from endoscopy on 8/12/24.   Hospital course with Episodes of coffee ground emesis on 8/19, and dark stools 8/19-8/20, associated with increasing Tbili, transaminanses, and ALP levels.   - IR bx 8/22 proving mets to liver   - [facility] palliative - family meeting 9/4 4 pm     Plan:   - Pantoprazole 40mg PO BID  - Onc following, not candidate for immunotherapy   - Rad onc re-consult, not candidate for palliative RT  -Palliative Care following, appreciate recs   - GI c/s not a candidate for EGD hemostasis   - Bx results show metastatic cancer, awaiting MMR PD-1 attendum will follow

## 2024-09-03 NOTE — PROGRESS NOTE ADULT - ATTENDING COMMENTS
72 y/o M, PMH CAD w/5 stents, HTN, HLD, recent Dx of gastric adenocarcinoma, Ukrainian speaking, presents with burning epigastric pain associated with eating, shortness of breath on exertion x 2 weeks. He was evaluated in [facility] with endoscopy for the abdominal pain, constipation and black stools on 8/12, which showed a bleeding gastric ulcer, biopsy was taken and hemostasis achieved, Biopsy positive for gastric adenocarcinoma. CT imaging revealed metastasis to liver and adjacent lymph nodes. He elected to pursue further care here in the [place], and deferred any treatment in [facility]. s/p liver biopsy 8/20.     Patient is not a candidate for  chemotherapy  or immunotherapy, per heme-onc. Team spoke with son and family interested in palliative care/comfort care but will talk to rest of family and let primary team know - plan for family meeting this week with palliative care.    Hgb intermittently downtrends with hemetemsis and melena. Not candidate for pallative radiation at this time. No role of endoscopy unless profusely bleeding as any intervention to stop bleed by GI will likely reoccur.     Unfortunately, Over the past week, he has gotten weaker (was walking with a walker early last week), tolerating less po intake, experiencing intermittent and persistent nausea. I explained to son at bedside that he is not improving and is likely to worsen the next few days.

## 2024-09-03 NOTE — PROGRESS NOTE ADULT - ASSESSMENT
74 y/o M, PMH CAD w/5 stents, HTN, HLD, recent Dx of gastric adenocarcinoma, Vietnamese speaking, presents with burning epigastric pain associated with eating, shortness of breath on exertion x 2 weeks. He was evaluated in [facility] with endoscopy for the abdominal pain, constipation and black stools on 8/12, which showed a bleeding gastric ulcer, biopsy was taken and hemostasis achieved, Biopsy positive for gastric adenocarcinoma. CT imaging revealed metastasis to liver and adjacent lymph nodes. He elected to pursue further care here in the [place], and deferred any treatment in [facility].

## 2024-09-03 NOTE — PROGRESS NOTE ADULT - PROBLEM SELECTOR PLAN 2
Hyponatremic 127/128 since admit. 8/27 124. 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     Much improved. 130s now.      Plan:  - Fluid restricted halal diet   - S/p Hypertonic 2% NaCl x 12 hours 8/31  - Trend sodium on BMPs  - Sodium bicarb 650 BID PO per nephro   - Nephro following, appreciate recs   - Asymptomatic, persistent poor pO intake  - Nephro following, appreciate recs

## 2024-09-04 NOTE — PROVIDER CONTACT NOTE (CRITICAL VALUE NOTIFICATION) - BACKGROUND
Patient is a 73y old  Male who presents with a chief complaint of Gastric adenocarcinoma
pt admitted with hyponatremia, hypo-osmolarity, or hypo-osmolar hyponatremia

## 2024-09-04 NOTE — PROGRESS NOTE ADULT - ASSESSMENT
73 year old male with PMH of CAD w/5 stents, HTN, HLD, recent Dx of gastric adenocarcinoma, Yi speaking, presents with burning epigastric pain associated with eating x 2 days, shortness of breath on exertion x 2 weeks. Hospital course thus farhas been complicated by coffee ground emesis and abnormal liver enzymes. Pt underwent for IR guided biopsy of liver mets 8/22. The nephrology team was consulted for hyponatremia.    LUKE   likely from volume depletion   Creatinine Trend: 1.33 <--, 1.14 <--, 1.54 <--, 1.43 <--, 1.30 <--, 1.47 <--  --- pending AM labs  repeat urine studies consistent with volume depletion   recommend IVF hydration with NS at 75cc/hr for 24 hours  serum CO2 remains below goal   increase to sodium bicarbonate 1300mg BID  please monitor for urinary retention  Monitor labs and urine output. Avoid nephrotoxins. Dose medications as per eGFR.    Hyponatremia- resolved  likely in setting of poor solute intake and SIADH  abdominal pain stimulating ADH  urine lytes reviewed; consistent with same   SNa improving 129>134>137>143  --- suspect he may be becoming dehydrated     plan   s/p HTS with 2% at 50cc/hour for 12 hours 8/30  encourage PO intake; protein shakes with meals   now okay to liberalize free water intake  monitor serum sodium daily    Hyperkalemia -K better  2/2 LUKE   maintain a low K diet   bicarb therapy will also help    If any questions, please feel free to contact me     Dandre Gaming  Nephrology Attending  Cell #[phone]

## 2024-09-04 NOTE — PROVIDER CONTACT NOTE (OTHER) - ACTION/TREATMENT ORDERED:
MD assessed pt at bedside, RRT called for low BP and difficulty breathing. see RRT note
PRN zofran  MD ordered to Hold heparin
protonix ordered
Dr. Anitha martin
No new order from MD
tylenol given. per MD, simethicone to be ordered
Md to come assess pt at bedside.
no new orders at this time

## 2024-09-04 NOTE — CONSULT NOTE ADULT - REASON FOR ADMISSION
New cancer diagnosis in [place]
Gastric adenocarcinoma

## 2024-09-04 NOTE — CHART NOTE - NSCHARTNOTEFT_GEN_A_CORE
Discussed with sons and rest of family at bedside including wife that patient's prognosis is very poor. Family acknowledged and agreed that their main goal was to keep patient comfortable at this time. Confirmed goal of comfort care without lab draws, transfusions and further treatments other than pain management. Will continue to have on High Flow NC as patient's work of breathing is much improved and appears comfortable.

## 2024-09-04 NOTE — PROVIDER CONTACT NOTE (OTHER) - NAME OF MD/NP/PA/DO NOTIFIED:
Dr jaime
MD rOta, González
Dr. Bella Welsh
Hilario Milton
Hilario Milton
Jose Mohan
Yoan Rich
MD Orta, González

## 2024-09-04 NOTE — PROGRESS NOTE ADULT - PROBLEM SELECTOR PLAN 6
2/2 thrombophlebitis. Small 3cm circular area of redness with associated induration and TTP over dorsum of L hand, no streaking, no spreading redness, no fluctuance, Sensorimotor function of L hand intact. Reports frequent needle sticks in L hand during recent hospitalization in [facility].  -L hand U/S of soft tissue neg for abscess  -warm compresses prn

## 2024-09-04 NOTE — PROGRESS NOTE ADULT - SUBJECTIVE AND OBJECTIVE BOX
[facility] : Ans Serv [phone], Office [phone]  Dr. Gaming/Dr Johnson/Dr Granados  /Dr Ac floyd /Dr ALLY Holbrook/Dr Elvin Georges/Dr Esteban Guzman /Dr CONSUELO Dhillon  _______________________________________________________________________________________________    Pt seen and examined this morning   appetite remains poor    VITALS:  T(F): 97.9 (09-04 @ 04:49), Max: 98.1 (09-02 @ 21:00)  HR: 100 (09-04 @ 04:49)  BP: 109/60 (09-04 @ 04:49)  ABP: --  RR: 18 (09-04 @ 04:49)  SpO2: 100% (09-04 @ 04:49)    09-03 @ 07:01  -  09-04 @ 07:00  --------------------------------------------------------  IN: 240 mL / OUT: 300 mL / NET: -60 mL      Physical Exam :-  Constitutional: ill appearing, frail  HEENT: anicteric sclera, oropharynx clear, MMM  Neck: supple.   Respiratory: Bilateral equal breath sounds , no wheezes, no crackles  Cardiovascular: S1, S2, Regular  Gastrointestinal: tender to palpation  Extremities: No peripheral edema  Neurological: awake and alert    Data:-  Allergies :   No Known Allergies    Hospital Medications:   MEDICATIONS  (STANDING):  aspirin  chewable 81 milliGRAM(s) Oral daily  finasteride 5 milliGRAM(s) Oral daily  heparin   Injectable 5000 Unit(s) SubCutaneous every 8 hours  levothyroxine 50 MICROGram(s) Oral daily  lidocaine   4% Patch 1 Patch Transdermal every 24 hours  pantoprazole    Tablet 40 milliGRAM(s) Oral every 12 hours  sodium bicarbonate 650 milliGRAM(s) Oral every 12 hours  tamsulosin 0.4 milliGRAM(s) Oral at bedtime    09-03    143  |  102  |  89<H>  ----------------------------<  108<H>  5.3   |  13<L>  |  1.33<H>    Ca    10.7<H>      03 Sep 2024 07:27  Phos  2.9     09-03  Mg     2.7     09-03    TPro  5.5<L>  /  Alb  2.2<L>  /  TBili  9.5<H>  /  DBili      /  AST  402<H>  /  ALT  215<H>  /  AlkPhos  569<H>  09-03    Creatinine Trend: 1.33 <--, 1.14 <--, 1.54 <--, 1.43 <--, 1.30 <--, 1.47 <--  egfr trend : 56 <--, 67 <--, 47 <--, 51 <--, 57 <--, 49 <--, 44 <--                        7.8    10.87 )-----------( 160      ( 03 Sep 2024 07:27 )             24.6

## 2024-09-04 NOTE — PROGRESS NOTE ADULT - ASSESSMENT
74 y/o M, PMH CAD w/5 stents, HTN, HLD, recent Dx of gastric adenocarcinoma, Malay speaking, presents with burning epigastric pain associated with eating, shortness of breath on exertion x 2 weeks. He was evaluated in [facility] with endoscopy for the abdominal pain, constipation and black stools on 8/12, which showed a bleeding gastric ulcer, biopsy was taken and hemostasis achieved, Biopsy positive for gastric adenocarcinoma. CT imaging revealed metastasis to liver and adjacent lymph nodes. He elected to pursue further care here in the [place], and deferred any treatment in [facility].

## 2024-09-04 NOTE — PROGRESS NOTE ADULT - PROBLEM SELECTOR PLAN 1
Biopsy proven in [facility] from endoscopy on 8/12/24.   Hospital course with Episodes of coffee ground emesis on 8/19, and dark stools 8/19-8/20, associated with increasing Tbili, transaminanses, and ALP levels.   - IR bx 8/22 proving mets to liver   - [facility] palliative - family meeting 9/4 4 pm     Plan:   - Pantoprazole 40mg PO BID  - Onc following, not candidate for immunotherapy   - Rad onc re-consult, not candidate for palliative RT  -Palliative Care following, appreciate recs   - GI c/s not a candidate for EGD hemostasis   - Bx results show metastatic cancer, awaiting MMR PD-1 attendum will follow

## 2024-09-04 NOTE — PROGRESS NOTE ADULT - ATTENDING COMMENTS
74 y/o M, PMH CAD w/5 stents, HTN, HLD, recent Dx of gastric adenocarcinoma, Yi speaking, presents with burning epigastric pain associated with eating, shortness of breath on exertion x 2 weeks. He was evaluated in [facility] with endoscopy for the abdominal pain, constipation and black stools on 8/12, which showed a bleeding gastric ulcer, biopsy was taken and hemostasis achieved, Biopsy positive for gastric adenocarcinoma. CT imaging revealed metastasis to liver and adjacent lymph nodes. He elected to pursue further care here in the [place], and deferred any treatment in [facility]. s/p liver biopsy 8/20.     Patient is not a candidate for  chemotherapy  or immunotherapy, per heme-onc. Team spoke with son and family interested in palliative care/comfort care but will talk to rest of family and let primary team know - plan for family meeting this week with palliative care.    Hgb intermittently downtrends with hemetemsis and melena. Not candidate for pallative radiation at this time. No role of endoscopy unless profusely bleeding as any intervention to stop bleed by GI will likely reoccur.     Unfortunately, Over the past week, he has gotten weaker (was walking with a walker early last week), tolerating less po intake, experiencing intermittent and persistent nausea. I explained to son at bedside. RRT today 9/4 for hypotension, worsening lethargy, hb 7.1 and lactic acidosis, gave fluids, team spoke with family  - patient is DNR/DNI, initially wanted pressors but now have decided to not escalate care. Likely to pass away tonight.

## 2024-09-04 NOTE — PROGRESS NOTE ADULT - REASON FOR ADMISSION
Gastric adenocarcinoma

## 2024-09-04 NOTE — PROGRESS NOTE ADULT - NSPROGADDITIONALINFOA_GEN_ALL_CORE
Preliminary note. Recommendations not finalized until attending attestation     Jose Mohan MD  PGY 1

## 2024-09-04 NOTE — PROGRESS NOTE ADULT - PROBLEM SELECTOR PROBLEM 1
Gastric adenocarcinoma

## 2024-09-04 NOTE — CONSULT NOTE ADULT - CONSULT REQUESTED DATE/TIME
16-Aug-2024 16:46
19-Aug-2024
16-Aug-2024 11:21
27-Aug-2024 09:37
30-Aug-2024 13:42
16-Aug-2024 13:14
04-Sep-2024 14:16

## 2024-09-04 NOTE — PROGRESS NOTE ADULT - ATTENDING SUPERVISION STATEMENT
Fellow
Resident

## 2024-09-04 NOTE — RAPID RESPONSE TEAM SUMMARY - NSSITUATIONBACKGROUNDRRT_GEN_ALL_CORE
RRT was called with primary team at bedside for increased work of breathing. Patient is 73M with PMHx metastatic gastric adenocarcinoma with hepatic mets, course complicated by slow GIB, no curative intentions from GI or heme/onc being offered, discussions ongoing about palliative approach.  At the RRT patient appeared at baseline mental status (AAO1-2, Malay speaking) and had intercostal retractions and tachypnea to 30 breaths/min. Pulse oximetry was stable >90s. BP initially was 70s/50s, gave a 1L bolus of normal saline, 1 unit pRBCs and got stat labs. Given patient's poor prognosis, discussed with family at bedside and over the phone regarding goals of care and patient was made DNR/DNI. BP improved with fluid and blood resuscitation. CXR was normal. Respiratory status improved with HFNC. Repeat BP with readings in 100s/70s. Family at bedside.  RRT was ended with primary team to follow up on labs, goals of care, and other interventions as appropriate.

## 2024-09-04 NOTE — PROVIDER CONTACT NOTE (OTHER) - REASON
1 episode of dark stool
Bloody emesis
Pt. does not want to take methadone
patient c/o abdominal pain/gas
Patient had 1 episode of small to moderate amount of blood
poor po intake
pt BP 77/54 and RR 30
pt with coffee ground emesis

## 2024-09-04 NOTE — CONSULT NOTE ADULT - CONSULT REASON
Hyponatremia
Stomach malignancy
Palliative RT
hypotension
Liver lesion bx
Metastatic GI malignancy
GOC

## 2024-09-04 NOTE — CONSULT NOTE ADULT - ATTENDING COMMENTS
73 year old male with a history of CAD, HTN, HLD and most notably, recently diagnosed gastric adenocarcinoma who presents with abdominal pain and GIB in the setting of likely progressive gastric adenocarcinoma found to have new metastatic disease to liver.    He has been seen by multiple services throughout his admission, and unfortunately there seems to be few viable treatment options for his malignancy. On the last documentation from heme-onc, recommendation of comfort based approach was made.     MICU was called for progressive encephalopathy, worsening lactate >16, severe acidosis, metabolic disarray, and hypotension. I had an extensive goals of care with the family at the bedside. Three sons, who are joint decision makers, were present. Ultimately agreed that comfort based approach to care would be most appropriate.     Discussed with primary team, who was present for the conversation. Would plan to transition to comfort care. Will discuss with Palliative as if a PCU bed were to open up, the family would like to see if their father could be moved to that unit. 73 year old male with a history of CAD, HTN, HLD and most notably, recently diagnosed gastric adenocarcinoma who presents with abdominal pain and GIB in the setting of likely progressive gastric adenocarcinoma found to have new metastatic disease to liver.    He has been seen by multiple services throughout his admission, and unfortunately there seems to be few viable treatment options for his malignancy. On the last documentation from heme-onc, recommendation of comfort based approach was made.     MICU was called for progressive encephalopathy, worsening lactate >16, severe acidosis, metabolic disarray, and hypotension. I had an extensive goals of care with the family at the bedside. Three sons, who are joint decision makers, were present. Ultimately agreed that comfort based approach to care would be most appropriate.     Discussed with primary team, who was present for the conversation. Would plan to transition to comfort care. Will discuss with Palliative as if a PCU bed were to open up, the family would like to see if their father could be moved.     Rest as above

## 2024-09-04 NOTE — CONSULT NOTE ADULT - SUBJECTIVE AND OBJECTIVE BOX
MICU Consult Note     08-16-24 (19d)    Patient is a 73y old  Male who presents with a chief complaint of Gastric adenocarcinoma (04 Sep 2024 09:25)      HPI:   74 y/o M, PMH CAD w/5 stents, HTN, HLD, recent Dx of gastric adenocarcinoma, Belarusian speaking, presents with burning epigastric pain associated with eating, shortness of breath on exertion x 2 weeks. He was evaluated in [facility] with endoscopy for the abdominal pain, constipation and black stools on 8/12, which showed a bleeding gastric ulcer, biopsy was taken and hemostasis achieved, Biopsy positive for gastric adenocarcinoma. CT imaging revealed metastasis to liver and adjacent lymph nodes. He elected to pursue further care here in the [place], and deferred any treatment in [facility].     MICU consulted for worsening labs, hypotension, and rising lactate. Upon visiting the patient, extensive discussion held with family members at bedside with family to pursue comfort measures.     ROS (if any):    MEDICATIONS  (STANDING):  aspirin  chewable 81 milliGRAM(s) Oral daily  dextrose 50% Injectable 50 milliLiter(s) IV Push once  finasteride 5 milliGRAM(s) Oral daily  insulin regular  human recombinant 5 Unit(s) IV Push once  levothyroxine 50 MICROGram(s) Oral daily  lidocaine   4% Patch 1 Patch Transdermal every 24 hours  pantoprazole    Tablet 40 milliGRAM(s) Oral every 12 hours  sodium bicarbonate 650 milliGRAM(s) Oral every 12 hours  tamsulosin 0.4 milliGRAM(s) Oral at bedtime    MEDICATIONS  (PRN):  acetaminophen     Tablet .. 650 milliGRAM(s) Oral every 6 hours PRN Temp greater or equal to 38C (100.4F), Mild Pain (1 - 3)  aluminum hydroxide/magnesium hydroxide/simethicone Suspension 30 milliLiter(s) Oral every 4 hours PRN Dyspepsia  melatonin 3 milliGRAM(s) Oral at bedtime PRN Insomnia  ondansetron Injectable 4 milliGRAM(s) IV Push every 8 hours PRN Nausea and/or Vomiting  polyethylene glycol 3350 17 Gram(s) Oral daily PRN Constipation  senna 2 Tablet(s) Oral at bedtime PRN Constipation  simethicone 80 milliGRAM(s) Chew every 8 hours PRN Heartburn          PHYSICAL EXAM:  Vital Signs Last 24 Hrs  T(C): 36.4 (04 Sep 2024 12:08), Max: 36.7 (03 Sep 2024 15:08)  T(F): 97.5 (04 Sep 2024 12:08), Max: 98 (03 Sep 2024 15:08)  HR: 102 (04 Sep 2024 12:08) (93 - 102)  BP: 102/58 (04 Sep 2024 12:08) (102/58 - 130/74)  BP(mean): --  RR: 30 (04 Sep 2024 12:02) (18 - 30)  SpO2: 100% (04 Sep 2024 12:02) (99% - 100%)    Parameters below as of 04 Sep 2024 12:02  Patient On (Oxygen Delivery Method): nasal cannula, high flow  O2 Flow (L/min): 55  O2 Concentration (%): 100    I&O's Summary    03 Sep 2024 07:01  -  04 Sep 2024 07:00  --------------------------------------------------------  IN: 240 mL / OUT: 300 mL / NET: -60 mL        General: NAD, non-toxic appearing   HEENT: PERRLA, EOMi, no scleral icterus  CV: RRR, normal S1 and S2, no m/r/g  Lungs: normal respiratory effort. CTAB, no wheezes, rales, or rhonchi  Abd: soft, nontender, nondistended  Ext: no edema, 2+ peripheral pulses   Skin: no rashes or lesions     LABS:  CAPILLARY BLOOD GLUCOSE      POCT Blood Glucose.: 103 mg/dL (04 Sep 2024 11:24)                              6.5    15.78 )-----------( 178      ( 04 Sep 2024 12:05 )             21.8       WBC Trend: 15.78<--, 13.86<--, 10.87<--  Hb Trend: 6.5<--, 7.1<--, 7.8<--, 8.6<--, 7.5<--    09-04    145  |  102  |  98<H>  ----------------------------<  116<H>  6.6<HH>   |  <10<LL>  |  2.28<H>    Ca    10.0      04 Sep 2024 12:06  Phos  5.8     09-04  Mg     3.1     09-04    TPro  5.2<L>  /  Alb  2.0<L>  /  TBili  9.0<H>  /  DBili  x   /  AST  511<H>  /  ALT  217<H>  /  AlkPhos  614<H>  09-04    PT/INR - ( 04 Sep 2024 13:28 )   PT: 17.6 sec;   INR: 1.70 ratio         PTT - ( 04 Sep 2024 13:28 )  PTT:36.3 sec      Urinalysis Basic - ( 04 Sep 2024 12:06 )    Color: x / Appearance: x / SG: x / pH: x  Gluc: 116 mg/dL / Ketone: x  / Bili: x / Urobili: x   Blood: x / Protein: x / Nitrite: x   Leuk Esterase: x / RBC: x / WBC x   Sq Epi: x / Non Sq Epi: x / Bacteria: x            RADIOLOGY & ADDITIONAL TESTS: Reviewed

## 2024-09-04 NOTE — PROGRESS NOTE ADULT - SUBJECTIVE AND OBJECTIVE BOX
DATE OF SERVICE: 09-04-24 @ 07:31    Patient is a 73y old  Male who presents with a chief complaint of Gastric adenocarcinoma (03 Sep 2024 09:08)      SUBJECTIVE / OVERNIGHT EVENTS:  Overnight,  Pt seen and examined at bedside.    ROS negative except as above.    MEDICATIONS  (STANDING):  aspirin  chewable 81 milliGRAM(s) Oral daily  finasteride 5 milliGRAM(s) Oral daily  heparin   Injectable 5000 Unit(s) SubCutaneous every 8 hours  levothyroxine 50 MICROGram(s) Oral daily  lidocaine   4% Patch 1 Patch Transdermal every 24 hours  pantoprazole    Tablet 40 milliGRAM(s) Oral every 12 hours  sodium bicarbonate 650 milliGRAM(s) Oral every 12 hours  tamsulosin 0.4 milliGRAM(s) Oral at bedtime    MEDICATIONS  (PRN):  acetaminophen     Tablet .. 650 milliGRAM(s) Oral every 6 hours PRN Temp greater or equal to 38C (100.4F), Mild Pain (1 - 3)  aluminum hydroxide/magnesium hydroxide/simethicone Suspension 30 milliLiter(s) Oral every 4 hours PRN Dyspepsia  melatonin 3 milliGRAM(s) Oral at bedtime PRN Insomnia  ondansetron Injectable 4 milliGRAM(s) IV Push every 8 hours PRN Nausea and/or Vomiting  polyethylene glycol 3350 17 Gram(s) Oral daily PRN Constipation  senna 2 Tablet(s) Oral at bedtime PRN Constipation  simethicone 80 milliGRAM(s) Chew every 8 hours PRN Heartburn      Vital Signs Last 24 Hrs  T(C): 36.6 (04 Sep 2024 04:49), Max: 36.7 (03 Sep 2024 15:08)  T(F): 97.9 (04 Sep 2024 04:49), Max: 98 (03 Sep 2024 15:08)  HR: 100 (04 Sep 2024 04:49) (93 - 100)  BP: 109/60 (04 Sep 2024 04:49) (105/61 - 130/74)  BP(mean): --  RR: 18 (04 Sep 2024 04:49) (18 - 18)  SpO2: 100% (04 Sep 2024 04:49) (99% - 100%)    Parameters below as of 04 Sep 2024 04:49  Patient On (Oxygen Delivery Method): room air      CAPILLARY BLOOD GLUCOSE        I&O's Summary    03 Sep 2024 07:01  -  04 Sep 2024 07:00  --------------------------------------------------------  IN: 240 mL / OUT: 300 mL / NET: -60 mL        PHYSICAL EXAM:  GENERAL: NAD, well-developed  HEAD:  Atraumatic, Normocephalic  EYES: EOMI, conjunctiva and sclera clear  NECK: Supple, No JVD  CHEST/LUNG: Clear to auscultation bilaterally; No wheeze  HEART: Regular rate and rhythm; No murmurs, rubs, or gallops  ABDOMEN: Soft, Nontender, Nondistended; Bowel sounds present  EXTREMITIES:  2+ Peripheral Pulses, No clubbing, cyanosis, or edema  NEUROLOGY: AOx3, non-focal  SKIN: No rashes or lesions    LABS:                        7.8    10.87 )-----------( 160      ( 03 Sep 2024 07:27 )             24.6     09-03    143  |  102  |  89<H>  ----------------------------<  108<H>  5.3   |  13<L>  |  1.33<H>    Ca    10.7<H>      03 Sep 2024 07:27  Phos  2.9     09-03  Mg     2.7     09-03    TPro  5.5<L>  /  Alb  2.2<L>  /  TBili  9.5<H>  /  DBili  x   /  AST  402<H>  /  ALT  215<H>  /  AlkPhos  569<H>  09-03            MICRO:      RADIOLOGY & ADDITIONAL TESTS:           DATE OF SERVICE: 09-04-24 @ 07:31    Patient is a 73y old  Male who presents with a chief complaint of Gastric adenocarcinoma (03 Sep 2024 09:08)      SUBJECTIVE / OVERNIGHT EVENTS:  Overnight, no acute events.   Pt seen and examined at bedside.    ROS negative except as above.    MEDICATIONS  (STANDING):  aspirin  chewable 81 milliGRAM(s) Oral daily  finasteride 5 milliGRAM(s) Oral daily  heparin   Injectable 5000 Unit(s) SubCutaneous every 8 hours  levothyroxine 50 MICROGram(s) Oral daily  lidocaine   4% Patch 1 Patch Transdermal every 24 hours  pantoprazole    Tablet 40 milliGRAM(s) Oral every 12 hours  sodium bicarbonate 650 milliGRAM(s) Oral every 12 hours  tamsulosin 0.4 milliGRAM(s) Oral at bedtime    MEDICATIONS  (PRN):  acetaminophen     Tablet .. 650 milliGRAM(s) Oral every 6 hours PRN Temp greater or equal to 38C (100.4F), Mild Pain (1 - 3)  aluminum hydroxide/magnesium hydroxide/simethicone Suspension 30 milliLiter(s) Oral every 4 hours PRN Dyspepsia  melatonin 3 milliGRAM(s) Oral at bedtime PRN Insomnia  ondansetron Injectable 4 milliGRAM(s) IV Push every 8 hours PRN Nausea and/or Vomiting  polyethylene glycol 3350 17 Gram(s) Oral daily PRN Constipation  senna 2 Tablet(s) Oral at bedtime PRN Constipation  simethicone 80 milliGRAM(s) Chew every 8 hours PRN Heartburn      Vital Signs Last 24 Hrs  T(C): 36.6 (04 Sep 2024 04:49), Max: 36.7 (03 Sep 2024 15:08)  T(F): 97.9 (04 Sep 2024 04:49), Max: 98 (03 Sep 2024 15:08)  HR: 100 (04 Sep 2024 04:49) (93 - 100)  BP: 109/60 (04 Sep 2024 04:49) (105/61 - 130/74)  BP(mean): --  RR: 18 (04 Sep 2024 04:49) (18 - 18)  SpO2: 100% (04 Sep 2024 04:49) (99% - 100%)    Parameters below as of 04 Sep 2024 04:49  Patient On (Oxygen Delivery Method): room air      CAPILLARY BLOOD GLUCOSE        I&O's Summary    03 Sep 2024 07:01  -  04 Sep 2024 07:00  --------------------------------------------------------  IN: 240 mL / OUT: 300 mL / NET: -60 mL        PHYSICAL EXAM:  GENERAL: NAD, lethargic, cathectic   HEAD:  Atraumatic, Normocephalic  EYES: EOMI, conjunctiva and sclera clear  NECK: Supple, No JVD  CHEST/LUNG: Clear to auscultation bilaterally; No wheeze  HEART: Regular rate and rhythm; No murmurs, rubs, or gallops  ABDOMEN: Soft, tenderness to palpation, Nondistended; Bowel sounds present  EXTREMITIES:  2+ Peripheral Pulses, No clubbing, cyanosis, or edema  NEUROLOGY: AOx3, non-focal  SKIN: No rashes or lesions    LABS:                        7.8    10.87 )-----------( 160      ( 03 Sep 2024 07:27 )             24.6     09-03    143  |  102  |  89<H>  ----------------------------<  108<H>  5.3   |  13<L>  |  1.33<H>    Ca    10.7<H>      03 Sep 2024 07:27  Phos  2.9     09-03  Mg     2.7     09-03    TPro  5.5<L>  /  Alb  2.2<L>  /  TBili  9.5<H>  /  DBili  x   /  AST  402<H>  /  ALT  215<H>  /  AlkPhos  569<H>  09-03            MICRO:      RADIOLOGY & ADDITIONAL TESTS:

## 2024-09-04 NOTE — PROVIDER CONTACT NOTE (OTHER) - SITUATION
Vital signs stable
patient has poor po intake today per family
Pt is throwing up. Blood noted.  Picture sent to md  4mg Zofran given IVP
Pt. does not want to take methadone PO
pt BP 77/54
patient c/o abdominal pain/gas
Vital signs stable
pt with coffee ground emesis

## 2024-09-04 NOTE — PROVIDER CONTACT NOTE (OTHER) - ASSESSMENT
pt is asleep, vss.
patient has poor po intake today per family. patient states he is unable to tolerate meals well 2/2 experiencing cramping with eating
pt BP 77/54, temp 97.5, RR 30, O2 100% and .
Patient is not in distress
patient c/o abdominal pain/gas. abdomen distended, patient states last BM 8/16

## 2024-09-04 NOTE — PROGRESS NOTE ADULT - PROBLEM SELECTOR PROBLEM 11
Preventive measure

## 2024-09-04 NOTE — PROGRESS NOTE ADULT - PROVIDER SPECIALTY LIST ADULT
Heme/Onc
Internal Medicine
Nephrology
Nephrology
Gastroenterology
Nephrology
Palliative Care
Palliative Care
Heme/Onc
Internal Medicine
Internal Medicine
Palliative Care
Internal Medicine
Internal Medicine
Palliative Care
Internal Medicine
Palliative Care
Internal Medicine

## 2024-09-04 NOTE — CONSULT NOTE ADULT - ASSESSMENT
74 y/o M, PMH CAD w/5 stents, HTN, HLD, recent Dx of gastric adenocarcinoma, [place]ic speaking, presents with burning epigastric pain associated with eating, shortness of breath on exertion x 2 weeks. MICU consulted for worsening labs with lactate >16.     #malignancy   #end of life   At this time, patient is demonstrating multi-organ failure. Extensive discussion held with family regarding goals of care.   - family to pursue comfort measures, endorsed to primary team   - to attempt to place in PCU     Case discussed with Dr. Palacios and fellow Dr. Deluca.

## 2024-09-04 NOTE — PROVIDER CONTACT NOTE (OTHER) - BACKGROUND
Stomach cancer  HTN  HLD
pt has a hx of GI bleed
Patient is a 73y old  Male who presents with a chief complaint of Gastric adenocarcinoma
Patient is a 73y old  Male who presents with a chief complaint of Gastric adenocarcinoma
HTN  BPH  HLD
pt admitted with hyponatremia, hypo-osmolarity, or hypo-osmolar hyponatremia

## 2024-09-04 NOTE — PROVIDER CONTACT NOTE (OTHER) - DATE AND TIME:
18-Aug-2024 14:06
04-Sep-2024 12:18
17-Aug-2024 16:00
28-Aug-2024 18:30
01-Sep-2024 21:29
02-Sep-2024 01:12
20-Aug-2024 07:23
18-Aug-2024 16:38

## 2024-09-05 NOTE — DISCHARGE NOTE FOR THE EXPIRED PATIENT - DEATH FALLS UNDER ME JURISDICTION?
----- Message from IDRIS Chau sent at 12/10/2018  8:07 AM CST -----  Urine is good.  UTI has resolved. Please see if she is feeling better.  
Patient is feeling well.  Symptoms are resolved.  No more pain in left side.    
No

## 2024-09-05 NOTE — CHART NOTE - NSCHARTNOTEFT_GEN_A_CORE
DEATH NOTE    Called to bedside to evaluate the patient for lack of pulse and breath sounds.     On physical exam, patient did not respond to verbal or noxious stimuli.  No spontaneous respirations.  Absent heart and breath sounds.  Absent radial and carotid pulses.   Pupils are fixed and dilated, no corneal reflex.  EKG rhythm strip shows asystole.   Patient pronounced dead at 2:45AM.  Attending notified.  Family declined autopsy.    Mireille Otoole M.D.  [facility] PGY-1

## 2024-09-05 NOTE — DISCHARGE NOTE FOR THE EXPIRED PATIENT - HOSPITAL COURSE
72 y/o M, PMH CAD w/5 stents, HTN, HLD, recent Dx of gastric adenocarcinoma, Azeri speaking, presented with burning epigastric pain associated with eating x 2 days, shortness of breath on exertion x 2 weeks. He was in his usual state of health until 2 weeks ago when he developed diffuse burning abdominal discomfort and constipation while in [facility]. He noticed shortness of breath when walking fast. He reports episodes of black stools on 8/1 and 8/4. He saw a physician there who found occult blood in his stool. He was referred for endoscopy on 8/12, which showed a bleeding gastric ulcer, biopsy was taken and hemostasis achieved. Biopsy report reveals gastric adenocarcinoma. Further imaging for staging revealed metastasis to liver and adjacent lymph nodes. He had one episode of black stools the day after endoscopy and normal stools since then. He elected to pursue further care here in the [place], and deferred any treatment in [facility].     In the ED, Hgb 12.8, Bili 1.5, Alk Phos 978, , ALT 71, lactate 5.4. CT/ CTA negative for PE or pulmonary mets, shows many hepatics mets and gastrohepatic lymphadenopathy.     He was seen by multiple services throughout his admission, and unfortunately there were few viable treatment options for his malignancy.    After discussion with the pt's family, pt was made comfort care measures only. Pt was found to have absent cardiopulmonary sounds and was pronounced death at 2:45AM.

## 2025-01-13 NOTE — PACU DISCHARGE NOTE - NS MD DISCHARGE NOTE DISCHARGE
Floor [Time Spent: ___ minutes] : I have spent [unfilled] minutes of time on the encounter which excludes teaching and separately reported services.

## 2025-07-24 NOTE — PROGRESS NOTE ADULT - PROBLEM/PLAN-7
DISPLAY PLAN FREE TEXT
Medication passed protocol.     Medication:   amLODIPine (NORVASC) 10 MG tablet       passed protocol.   Last office visit date: 6/2/2025  Next appointment scheduled?: Yes   
DISPLAY PLAN FREE TEXT